# Patient Record
Sex: FEMALE | Race: BLACK OR AFRICAN AMERICAN | NOT HISPANIC OR LATINO | Employment: PART TIME | URBAN - METROPOLITAN AREA
[De-identification: names, ages, dates, MRNs, and addresses within clinical notes are randomized per-mention and may not be internally consistent; named-entity substitution may affect disease eponyms.]

---

## 2017-01-03 ENCOUNTER — ALLIED HEALTH/NURSE VISIT (OUTPATIENT)
Dept: NURSING | Facility: CLINIC | Age: 47
End: 2017-01-03

## 2017-01-03 VITALS — BODY MASS INDEX: 39.18 KG/M2 | WEIGHT: 254.06 LBS

## 2017-01-03 DIAGNOSIS — E66.01 MORBID OBESITY (H): Primary | ICD-10-CM

## 2017-01-03 PROCEDURE — 99207 ZZC HEALTH COACHING, NO CHARGE: CPT

## 2017-01-03 NOTE — MR AVS SNAPSHOT
After Visit Summary   1/3/2017    Keerthi Sloan    MRN: [id number]           Patient Information     Date Of Birth          1970        Visit Information        Provider Department      1/3/2017 10:00 AM HEALTH  - [facility]        Care Instructions      January 3, 2017    [facility]  [address] [address]  [phone]  Health Coaching Progress Note    Patient Name: Keerthi Sloan Date: January 3, 2017    Plan/Goal for the Next 4 Weeks:   GOAL #1: Make it a priority to go shopping more often for fresh food items-shop at least 1 time every 2 weeks  GOAL #1 Progress Toward Goal: 100% Maintenance  GOAL #2: Be more aware of food choices/do not skip meals and snacks throughout the day/use smaller portions-use the new food prep plastic containers  GOAL #2 Progress Toward Goal: 100% Maintenance  GOAL #3: Start routine of going to the [facility] to exercise at least 2 times a week  GOAL #3 Progress Toward Goal: 0% New Goal  GOAL #4: Track daily step count and shoot for at least 5,000 steps per day  GOAL #4 Progress Toward Goal: 0% New Goal  GOAL #5: Start attending physical therapy as directed/schedule a check-in with Janet Endocrinologist  GOAL #5 Progress Toward Goal: 50% Attending PT still need to schedule appt. W/ Janet  GOAL #6: Long term weight loss goal of 165lbs!  GOAL #6 Progress Toward Goal: 0% New Goal      Check out the American Diabetes Website for healthy recipes.  Www.diabetes.org-->go to the recipes tab at the top!       Plan: (Homework, other):  Patient was encouraged to continue to seek condition-related information and education, as well as schedule a follow up appointment with the Health  in 6 weeks. Patient has set self-identified goals and will monitor progress until the next appointment.  Scheduled our next coaching session for Monday February  13th at 2pm.  Jacob Dominguez       Resources:              Follow-ups after your visit        Who to contact     If you have questions or need follow up information about today's clinic visit or your schedule please contact [facility] directly at [phone].  Normal or non-critical lab and imaging results will be communicated to you by MyChart, letter or phone within 4 business days after the clinic has received the results. If you do not hear from us within 7 days, please contact the clinic through [employer] or phone. If you have a critical or abnormal lab result, we will notify you by phone as soon as possible.  Submit refill requests through [employer] or call your pharmacy and they will forward the refill request to us. Please allow 3 business days for your refill to be completed.          Additional Information About Your Visit        MyChart Information     [employer] gives you secure access to your electronic health record. If you see a primary care provider, you can also send messages to your care team and make appointments. If you have questions, please call your primary care clinic.  If you do not have a primary care provider, please call [phone] and they will assist you.        Care EveryWhere ID     This is your Care EveryWhere ID. This could be used by other organizations to access your [facility] medical records  [id number]         Blood Pressure from Last 3 Encounters:   10/03/16 105/74   09/08/16 108/66   09/07/16 134/83    Weight from Last 3 Encounters:   10/03/16 269 lb (122.018 kg)   09/08/16 282 lb (127.914 kg)   09/07/16 283 lb 6.4 oz (128.549 kg)              Today, you had the following     No orders found for display       Primary Care Provider Office Phone # Fax #    [facility] [phone] [address] [facility] [address]        Thank you!     Thank you for choosing [facility]  for your care. Our goal is always to provide  you with excellent care. Hearing back from our patients is one way we can continue to improve our services. Please take a few minutes to complete the written survey that you may receive in the mail after your visit with us. Thank you!             Your Updated Medication List - Protect others around you: Learn how to safely use, store and throw away your medicines at www.disposemymeds.org.          This list is accurate as of: 1/3/17 11:12 AM.  Always use your most recent med list.                   Brand Name Dispense Instructions for use    Ferrous Sulfate 27 MG Tabs      1 tab QD       metFORMIN 500 MG 24 hr tablet    GLUCOPHAGE-XR    60 tablet    Take 1 tablet (500 mg) by mouth 2 times daily (with meals)       naproxen 250 MG tablet    NAPROSYN     1 tab DAILY prn       omeprazole 40 MG capsule    priLOSEC    30 capsule    Take 1 capsule (40 mg) by mouth daily Take 30-60 minutes before a meal.       phentermine 37.5 MG tablet    ADIPEX-P    32 tablet    Take 1 tablet (37.5 mg) by mouth every morning (before breakfast)       topiramate 50 MG tablet    TOPAMAX    180 tablet    Take 1 tablet (50 mg) by mouth 2 times daily       vitamin D 04670 UNIT capsule    ERGOCALCIFEROL    12 capsule    Take 1 capsule (50,000 Units) by mouth every 7 days

## 2017-01-03 NOTE — PATIENT INSTRUCTIONS
January 3, 2017    [facility]  [address] [place] [address]  [phone]  Health Coaching Progress Note    Patient Name: Keerthi Sloan Date: January 3, 2017    Plan/Goal for the Next 4 Weeks:   GOAL #1: Make it a priority to go shopping more often for fresh food items-shop at least 1 time every 2 weeks  GOAL #1 Progress Toward Goal: 100% Maintenance  GOAL #2: Be more aware of food choices/do not skip meals and snacks throughout the day/use smaller portions-use the new food prep plastic containers  GOAL #2 Progress Toward Goal: 100% Maintenance  GOAL #3: Start routine of going to the [facility] to exercise at least 2 times a week  GOAL #3 Progress Toward Goal: 0% New Goal  GOAL #4: Track daily step count and shoot for at least 5,000 steps per day  GOAL #4 Progress Toward Goal: 0% New Goal  GOAL #5: Start attending physical therapy as directed/schedule a check-in with Janet Endocrinologist  GOAL #5 Progress Toward Goal: 50% Attending PT still need to schedule appt. W/ Janet  GOAL #6: Long term weight loss goal of 165lbs!  GOAL #6 Progress Toward Goal: 0% New Goal      Check out the American Diabetes Website for healthy recipes.  Www.diabetes.org-->go to the recipes tab at the top!       Plan: (Homework, other):  Patient was encouraged to continue to seek condition-related information and education, as well as schedule a follow up appointment with the Health  in 6 weeks. Patient has set self-identified goals and will monitor progress until the next appointment.  Scheduled our next coaching session for Monday February 13th at 2pm.  Jacob Dominguez       Resources:

## 2017-01-03 NOTE — PROGRESS NOTES
January 3, 2017    [facility]  [address] [place] [id number] [phone]  Health Coaching Progress Note    Patient Name: Keerthi Sloan Date: January 3, 2017      Session Length: 40      DATA    PRM Master Survey Scores Reviewed: Yes    Core Healthy Days Survey:         YAQUELIN Score (Last Two) 6/27/2016   YAQUELIN Raw Score 39   Activation Score 56.4   YAQUELIN Level 3       PHQ-2 Score 10/3/2016 10/2/2016   PHQ-2 Total Score Interpretation - Positive if 3 or more points; Administer PHQ-9 if positive 0 -   MyC PHQ-2 Score - 0       No flowsheet data found.    Treatment Objective(s) Addressed in This Session:  Target Behavior(s): diet/weight loss and disease management/lifestyle changes of making sure to have healthy foods on hand, making healthier dietary choices/not skipping meals/portion size-using pre-portioned containers for work, getting into an exercise routine-going to [facility] with granddaughter, making time for self, tracking steps with new fit-bit, continuing to get used to new work schedule/finding time to be active, weight loss and maintenance, staying on track, and increasing her water intake!    Current Stressors / Issues:  Recently repaired torn meniscus-reason for her pain past 3 years, recovering from knee surgery/starting PT exercises, new job-wears her out/works nights now, getting used to new routine, staying on track with her diet, prepping for cooking/using a plastic pre-portion meal planning container, being more active as able, staying motivated, continued weight loss!  What Patient Does Well:   1) Patient is doing a great job building a new healthy routine-eating healthier/plans to start exercising more now that meniscus surgery is done!  Previous Successes:   1) Patient was down 25lbs at last check up and weighed in at 254.1 today so she is down a total of 36lbs!  Areas in Need of Improvement:   1) Activity  level/exercise and consistency of changes  2) Diet modification-eating less/healthier foods  3) Building a healthy routine and maintaining it  Barriers to Change:   1) New work schedule/still getting used to new routine-working nights  2) Time-making time for self  3) Meniscus tear-surgery was a success-Currently in PT 2x/week  Reasons for Change:   1) Be healthier/move better-reduce strain on knees  2) Lose weight/avoid other health complications  3) Be around for granddaughter  Plan/Goal for the Next 4 Weeks:   GOAL #1: Make it a priority to go shopping more often for fresh food items-shop at least 1 time every 2 weeks  GOAL #1 Progress Toward Goal: 100% Maintenance  GOAL #2: Be more aware of food choices/do not skip meals and snacks throughout the day/use smaller portions-use new plastic pre-portioned cases for lunches  GOAL #2 Progress Toward Goal: 100% Maintenance  GOAL #3: Start routine of going to the [facility] to exercise at least 2 times a week  GOAL #3 Progress Toward Goal: 25% (ready to start this after knee surgery!)  GOAL #4: Track daily step count and shoot for at least 2,500 steps per day  GOAL #4 Progress Toward Goal: 100% Completed!  (Getting closer to 4-5000 daily!)  GOAL #5: Look into purchasing a fit-bit bracelet tracker-won't miss steps that way!  GOAL #5 Progress Toward Goal: 100% Completed (Got a fit-bit through work!)  GOAL #6: Maintain current amount of weight lost through period of inactivity (meniscus tear surgery) and follow recommendations for recovery/physical therapy  GOAL #6 Progress Toward Goal: 100% Completed! Actually Lost another 14lbs!  GOAL #7: Start attending physical therapy as directed/schedule a check-in with Janet Endocrinologist  GOAL #7 Progress Toward Goal: 50% (Doing physical therapy/still needs to set-up appt with Jaent)  GOAL #8: Track daily step count on fit-bit and shoot for 5,000 steps or more each day   GOAL #8 Progress Toward Goal: 0% New Goal    Long Term Weight  Loss Goal: Weigh in at 165lbs!    Intervention:  Motivational Interviewing    MI Intervention: Expressed Empathy/Understanding, Supported Autonomy, Collaboration, Evocation, Permission to raise concern or advise, Open-ended questions, Reflections: simple and complex and Change talk (evoked)     Change Talk Expressed by the Patient: Desire to change Ability to change Reasons to change Need to change Committment to change Activation Taking steps    Provider Response to Change Talk: E - Evoked more info from patient about behavior change, A - Affirmed patient's thoughts, decisions, or attempts at behavior change, R - Reflected patient's change talk and S - Summarized patient's change talk statements    Assessment / Progress on Treatment Objective(s) / Homework:    Achieved / completed to satisfaction - MAINTENANCE (Working to maintain change, with risk of relapse); Intervened by continuing to positively reinforce healthy behavior choice   Minimal progress - PREPARATION (Decided to change - considering how); Intervened by negotiating a change plan and determining options / strategies for behavior change, identifying triggers, exploring social supports, and working towards setting a date to begin behavior change  New Objective established this session - PREPARATION (Decided to change - considering how); Intervened by negotiating a change plan and determining options / strategies for behavior change, identifying triggers, exploring social supports, and working towards setting a date to begin behavior change  Satisfactory progress - ACTION (Actively working towards change); Intervened by reinforcing change plan / affirming steps taken         Plan: (Homework, other):  Patient was encouraged to continue to seek condition-related information and education, as well as schedule a follow up appointment with the Health  in 6 weeks.. Patient has set self-identified goals and will monitor progress until the next appointment.   Scheduled our next coaching visit for Monday February 13th at 2pm.      Jacob Dominguez

## 2017-01-23 ENCOUNTER — OFFICE VISIT (OUTPATIENT)
Dept: ENDOCRINOLOGY | Facility: CLINIC | Age: 47
End: 2017-01-23
Payer: COMMERCIAL

## 2017-01-23 VITALS
TEMPERATURE: 98.3 F | WEIGHT: 259.8 LBS | BODY MASS INDEX: 39.37 KG/M2 | DIASTOLIC BLOOD PRESSURE: 78 MMHG | RESPIRATION RATE: 16 BRPM | SYSTOLIC BLOOD PRESSURE: 126 MMHG | HEIGHT: 68 IN | HEART RATE: 70 BPM

## 2017-01-23 DIAGNOSIS — R73.03 PREDIABETES: Primary | ICD-10-CM

## 2017-01-23 DIAGNOSIS — E55.9 VITAMIN D DEFICIENCY: ICD-10-CM

## 2017-01-23 DIAGNOSIS — E66.01 MORBID OBESITY DUE TO EXCESS CALORIES (H): ICD-10-CM

## 2017-01-23 PROBLEM — D50.9 ANEMIA, IRON DEFICIENCY: Status: ACTIVE | Noted: 2017-01-23

## 2017-01-23 LAB
ERYTHROCYTE [DISTWIDTH] IN BLOOD BY AUTOMATED COUNT: 17.7 % (ref 10–15)
HBA1C MFR BLD: 5.7 % (ref 4.3–6)
HCT VFR BLD AUTO: 39 % (ref 35–47)
HGB BLD-MCNC: 12.2 G/DL (ref 11.7–15.7)
MCH RBC QN AUTO: 23.8 PG (ref 26.5–33)
MCHC RBC AUTO-ENTMCNC: 31.3 G/DL (ref 31.5–36.5)
MCV RBC AUTO: 76 FL (ref 78–100)
PLATELET # BLD AUTO: 234 10E9/L (ref 150–450)
RBC # BLD AUTO: 5.12 10E12/L (ref 3.8–5.2)
WBC # BLD AUTO: 6.7 10E9/L (ref 4–11)

## 2017-01-23 PROCEDURE — 85027 COMPLETE CBC AUTOMATED: CPT | Performed by: CLINICAL NURSE SPECIALIST

## 2017-01-23 PROCEDURE — 80053 COMPREHEN METABOLIC PANEL: CPT | Performed by: CLINICAL NURSE SPECIALIST

## 2017-01-23 PROCEDURE — 36415 COLL VENOUS BLD VENIPUNCTURE: CPT | Performed by: CLINICAL NURSE SPECIALIST

## 2017-01-23 PROCEDURE — 83540 ASSAY OF IRON: CPT | Performed by: CLINICAL NURSE SPECIALIST

## 2017-01-23 PROCEDURE — 82728 ASSAY OF FERRITIN: CPT | Performed by: CLINICAL NURSE SPECIALIST

## 2017-01-23 PROCEDURE — 83036 HEMOGLOBIN GLYCOSYLATED A1C: CPT | Performed by: CLINICAL NURSE SPECIALIST

## 2017-01-23 PROCEDURE — 99213 OFFICE O/P EST LOW 20 MIN: CPT | Performed by: CLINICAL NURSE SPECIALIST

## 2017-01-23 PROCEDURE — 83550 IRON BINDING TEST: CPT | Performed by: CLINICAL NURSE SPECIALIST

## 2017-01-23 PROCEDURE — 82306 VITAMIN D 25 HYDROXY: CPT | Performed by: CLINICAL NURSE SPECIALIST

## 2017-01-23 NOTE — PROGRESS NOTES
"Chief Complaint   Patient presents with     RECHECK     Vit. D, Metformin, and phentermine        Initial /78 mmHg  Pulse 70  Temp(Src) 98.3  F (36.8  C)  Resp 16  Ht 1.715 m (5' 7.5\")  Wt 117.845 kg (259 lb 12.8 oz)  BMI 40.07 kg/m2  LMP 01/01/2017 (Exact Date)  Breastfeeding? No Estimated body mass index is 40.07 kg/(m^2) as calculated from the following:    Height as of this encounter: 1.715 m (5' 7.5\").    Weight as of this encounter: 117.845 kg (259 lb 12.8 oz).  BP completed using cuff size: large    Waist measurement: 39.75in    Pat Dominguez CMA    1/23/2017  2:54 PM    "

## 2017-01-23 NOTE — PROGRESS NOTES
Name: Keerthi Sloan  F/u for obesity (Last seen 7/13/2016).  HPI:  Keerthi Sloan is a 46 year old female who presents for the f/u of prediabetes, vitamin D deficiency, sleep apnea on CPAP, and obesity.  She started gaining weight in her 30's.  Weight stabilized in the low 300's the past 5 years and she was unable to lose any weight with diet efforts.  She considered gastric bypass and was being seen at [facility] in anticipation of doing the procedure but she didn't feel comfortable doing the bypass so quit going to the clinic.      She was briefly treated with Phentermine 5/2016.  She discontinued this on her own after 1-2 months.  She has been able to lose 51 lbs with diet efforts, 305-->288-->254 lbs, since 12/2015, todays weight increased slightly 259 lbs. Has also been working with Jacob, the health .    + prediabetes - treated with Metformin 500 mg bid.  A1c improved 6.1--->5.5%. Off Phentermine since knee surgery 10/2016.  Would like to resume.  +iron deficiency - thought related to heavy menses, started oral iron 6/2016.  +vitamin D deficiency - D level 15, started vitamin D 50,000 IU weekly.  D improved to 22 (9/2016).  Has continued D 50K/week.  Sleep Apnea/Snores:Yes: diagnosed with sleep apnea, uses CPAP  Hyperlipidemia:Yes: elevated triglycerides  Hirsutism:Yes: increased facial hair growth (chin)  Fmily history of Obesity:Yes: Mother had gastric bypass  Diet: Eating whole, unprocessed foods, increased veggies, portion control.  Trying to eat more regularly throughout the day - usual eating pattern is to skip breakfast and lunch then overeat at dinner because she is so hungry by that point).  Has been trying to pack food for breakfast and lunch while at work.   Exercise:Yes: recently joined the [facility]   Health :  First session 6/27/2016, most recent session 1/3/2017:  Health coaching GOALS: Patient will work on the following goals until the next health  meeting:  Plan/Goal for the  Next 4 Weeks:   GOAL #1: Make it a priority to go shopping more often for fresh food items-shop at least 1 time every 2 weeks  GOAL #1 Progress Toward Goal: 100% Maintenance  GOAL #2: Be more aware of food choices/do not skip meals and snacks throughout the day/use smaller portions-use new plastic pre-portioned cases for lunches  GOAL #2 Progress Toward Goal: 100% Maintenance  GOAL #3: Start routine of going to the [facility] to exercise at least 2 times a week  GOAL #3 Progress Toward Goal: 25% (ready to start this after knee surgery!)  GOAL #4: Track daily step count and shoot for at least 2,500 steps per day  GOAL #4 Progress Toward Goal: 100% Completed!  (Getting closer to 4-5000 daily!)  GOAL #5: Look into purchasing a fit-bit bracelet tracker-won't miss steps that way!  GOAL #5 Progress Toward Goal: 100% Completed (Got a fit-bit through work!)  GOAL #6: Maintain current amount of weight lost through period of inactivity (meniscus tear surgery) and follow recommendations for recovery/physical therapy  GOAL #6 Progress Toward Goal: 100% Completed! Actually Lost another 14lbs!  GOAL #7: Start attending physical therapy as directed/schedule a check-in with Janet Endocrinologist  GOAL #7 Progress Toward Goal: 50% (Doing physical therapy/still needs to set-up appt with Janet)  GOAL #8: Track daily step count on fit-bit and shoot for 5,000 steps or more each day    GOAL #8 Progress Toward Goal: 0% New Goal    Long Term Weight Loss Goal: Weigh in at 165lbs!2.      3.  Other: elevated 11pm salivary cortisol.  1 mg Dexamethasone suppression test was normal.      PMH/PSH:  No past medical history on file.  Past Surgical History   Procedure Laterality Date     Hernia repair       Gyn surgery       Tubal ligation  1997     LBTL     Family Hx:  Family History   Problem Relation Age of Onset     DIABETES Father      Hypertension Father      KIDNEY DISEASE Father      DIABETES Brother      Hypertension Brother      Thyroid  "disease: No         DM2: Yes: father         Autoimmune: DM1, SLE, RA, Vitiligo No    Social Hx:  Social History     Social History     Marital Status:      Spouse Name: N/A     Number of Children: 3     Years of Education: N/A     Occupational History     [employer]     Social History Main Topics     Smoking status: Never Smoker      Smokeless tobacco: Never Used     Alcohol Use: No     Drug Use: No     Sexual Activity: Not Currently     Other Topics Concern     Not on file     Social History Narrative          MEDICATIONS:  has a current medication list which includes the following prescription(s): vitamin d, ferrous sulfate, naproxen, topiramate, phentermine, and metformin.    ROS     GENERAL: + weight loss with effort, history of continued weight gain, no fevers, + fatigue, + night sweats and hot flashes.   HEENT: no dysphagia, odynophagia, diplopia, neck pain or tenderness, dry/scratch eyes, URI, cough, sinus drainage, tinnitus, sinus pressure  CV: no chest pain, pressure, palpitations, skipped beats, LOC  LUNGS: no SOB, STEELE, cough, sputum production, wheezing   ABDOMEN: no diarrhea, constipation, abdominal pain  EXTREMITIES: no rashes, ulcers, edema  NEUROLOGY: no changes in vision, tingling or numbness in hands or feet.   MSK: + muscle aches, + joint pain, no weakness  SKIN: no rashes or lesions  ENDOCRINE: no heat or cold intolerance    Physical Exam   VS: /78 mmHg  Pulse 70  Temp(Src) 98.3  F (36.8  C)  Resp 16  Ht 1.715 m (5' 7.5\")  Wt 117.845 kg (259 lb 12.8 oz)  BMI 40.07 kg/m2  LMP 01/01/2017 (Exact Date)  Breastfeeding? No  GENERAL: NAD, well dressed, answering questions appropriately, appears stated age.  HEENT: OP clear, no exopthalmous, no proptosis, EOMI, no lig lag, no retraction, no scleral icterus  RESPIRATORY: Clear bilaterally. Normal respiratory effort.  CARDIOVASCULAR: RRR.  No peripheral edema.  EXTREMITIES: no edema, +pulses, no rashes, no " lesions  NEUROLOGY: CN grossly intact, no tremors  MSK: grossly intact, No digital cyanosis. Normal gait and station.  SKIN: no rashes, no lesions, no ulcers  PSYCH: Intact judgment and insight. A&OX3 with a cordial affect.    LABS:  !COMPREHENSIVE Latest Ref Rng 12/3/2015 5/26/2016   SODIUM 133 - 144 mmol/L 136 138   POTASSIUM 3.4 - 5.3 mmol/L 4.3 3.6   CHLORIDE 94 - 109 mmol/L 104 105   BUN 7 - 30 mg/dL 12 13   Creatinine 0.52 - 1.04 mg/dL 0.61 0.62   Glucose 70 - 99 mg/dL 84 91   ANION GAP 3 - 14 mmol/L 8 5   CALCIUM 8.5 - 10.1 mg/dL 8.5 8.7     Component    Latest Ref Rn 5/26/2016 9/8/2016   Hemoglobin A1C    4.3 - 6.0 % 6.1 (H) 5.5     Component    Latest Ref [facility] 5/26/2016   C-Peptide    0.9 - 6.9 ng/mL 1.9   Insulin     9     Component    Latest Ref Rn 5/26/2016   WBC    4.0 - 11.0 10e9/L 6.0   RBC Count    3.8 - 5.2 10e12/L 5.08   Hemoglobin    11.7 - 15.7 g/dL 10.9 (L)   Hematocrit    35.0 - 47.0 % 35.7   MCV    78 - 100 fl 70 (L)   MCH    26.5 - 33.0 pg 21.5 (L)   MCHC    31.5 - 36.5 g/dL 30.5 (L)   RDW    10.0 - 15.0 % 17.7 (H)   Platelet Count    150 - 450 10e9/L 225     Component    Latest Ref Rn 5/26/2016   Iron    35 - 180 ug/dL 27 (L)   Iron Binding Cap    240 - 430 ug/dL 378   Iron Saturation Index    15 - 46 % 7 (L)   Ferritin    12 - 150 ng/mL 3 (L)     Component    Latest Ref Rn 5/26/2016   Saliva Collection Time     2300   Cortisol Saliva     0.129     1 mg Dexamethasone suppression test:  Component    Latest Ref Rn 6/17/2016   Cortisol Serum    4 - 22 ug/dL 0.8 (L)     Component    Latest Ref Rn 5/26/2016   Testosterone Total    8 - 60 ng/dL 4 (L)   Sex Hormone Binding Globulin    30 - 135 nmol/L 34   Free Testosterone Calculated    0.11 - 0.58 ng/dL 0.07 (L)   Estradiol     26   FSH     5.8   Lutropin     2.7     Component      Latest Ref Rng 5/26/2016 9/8/2016   Vitamin D Deficiency screening      20 - 75 ug/L 15 (L) 22     Vital Signs 7/13/2016 8/5/2016 9/7/2016 9/8/2016 10/3/2016  "  Weight (LB) 288 lb 277 lb 6 oz 283 lb 6.4 oz 282 lb 269 lb   Height   5' 7\"  5' 7.5\"   BMI   44.48  41.6     Vital Signs 1/3/2017 1/23/2017   Weight (LB) 254 lb 1 oz 259 lb 12.8 oz   Height  5' 7.5\"   BMI  40.17       All pertinent notes, labs, and images personally reviewed by me.     A/P  Ms.Kimberly CAROLYN Sloan is a 46 year old here for the evaluation of obesity:    1. IFG/prediabetes.  Fasting glucose elevated x 2, most recently in 2014.  + FH of type 2 diabetes.  No personal history of GD (3 pregnancies).  A1c elevated at 6.1%, improved to 5.5%.  Started Metformin  mg bid 6/2016, off since 10/2016.  Restart Metformin  mg bid. Was tolerating Metformin well.  Continue current dose.  Plan to recheck A1c today and if increased > 5.7% will consider increasing Metformin to 2000 mg/day.    2. Obesity-  Current BMI 44.  Her comorbidities include IFG/prediabetes and MARÍA.  Obesity is associated with a significant increase in mortality and risk of many disorders, including diabetes mellitus, hypertension, dyslipidemia, heart disease, stroke, sleep apnea, cancer, and many others. Conversely, weight loss is associated with a reduction in obesity-associated morbidity.    Endocrine evaluation of obesity includes Diabetes/prediabetes, Cushing's and thyroid dysfunction.  The relevant work up for the diagnosis and management of obesity and various treatment options were discussed. Body Mass Index (BMI) has been a standard means for calculating risk for overweight and obesity. The new American Association of Clinical Endocrinology (AACE) algorithm recommends lifestyle modifications as the initial phase of treatment for all patients with the BMI equal or greater than 25 kg/m2. Lifestyle modifications includes use of medical nutrition therapy, exercise, tobacco cessation, adequate quality and quantity of sleep, limited consumption of alcohol and reduced stress through implementation of a structured, multidisciplinary " program.    In patients with complications associated with obesity, graded interventions are recommended including pharmacological therapy such as phentermine, orlistat, lorcaserin and phentermine/topiramate ER, contrave ( buproprion/naltreone) and the use of very low calorie meal replacement programs.    If medical intervention is insufficient, surgical therapy may be considered, especially in patients with BMI greater than or equal to 35 kg/m2 with multiple complications. Surgical treatments include lap-band, gastric sleeve or gastric bypass surgery.    She is considering surgical weight loss options.  Interested in transoral gastroplasty - procedure is done at [place].  Counseling exercise ( V65.41)  Dietary counseling( V65.3)  Calculated BMI above the upper parameter and f/u plan was documented in the medical record.  Discussed indications, risks and benefits of all medications prescribed, and answered questions to patient's satisfaction.  Continue working with Health .    3. Vitamin D deficiency.  Vitamin D level low at 15, improved to 22.  Started Vitamin D 50,000 IU weekly 6/2016.  Plan to recheck D level today.    Labs ordered today: No orders of the defined types were placed in this encounter.     Radiology/Consults ordered today: None    More than 50% of the time spent with Ms. Sloan on counseling / coordinating her care.  Total face to face time was greater than or equal to 15 minutes.      Follow-up:  6 months    Janet Castaneda NP  Endocrinology  [facility]  CC:   ____________________________________________________________

## 2017-01-24 LAB
ALBUMIN SERPL-MCNC: 3.8 G/DL (ref 3.4–5)
ALP SERPL-CCNC: 52 U/L (ref 40–150)
ALT SERPL W P-5'-P-CCNC: 14 U/L (ref 0–50)
ANION GAP SERPL CALCULATED.3IONS-SCNC: 9 MMOL/L (ref 3–14)
AST SERPL W P-5'-P-CCNC: 9 U/L (ref 0–45)
BILIRUB SERPL-MCNC: 0.4 MG/DL (ref 0.2–1.3)
BUN SERPL-MCNC: 12 MG/DL (ref 7–30)
CALCIUM SERPL-MCNC: 9.1 MG/DL (ref 8.5–10.1)
CHLORIDE SERPL-SCNC: 106 MMOL/L (ref 94–109)
CO2 SERPL-SCNC: 26 MMOL/L (ref 20–32)
CREAT SERPL-MCNC: 0.66 MG/DL (ref 0.52–1.04)
DEPRECATED CALCIDIOL+CALCIFEROL SERPL-MC: 21 UG/L (ref 20–75)
FERRITIN SERPL-MCNC: 4 NG/ML (ref 12–150)
GFR SERPL CREATININE-BSD FRML MDRD: NORMAL ML/MIN/1.7M2
GLUCOSE SERPL-MCNC: 89 MG/DL (ref 70–99)
IRON SATN MFR SERPL: 10 % (ref 15–46)
IRON SERPL-MCNC: 37 UG/DL (ref 35–180)
POTASSIUM SERPL-SCNC: 4.2 MMOL/L (ref 3.4–5.3)
PROT SERPL-MCNC: 7.7 G/DL (ref 6.8–8.8)
SODIUM SERPL-SCNC: 141 MMOL/L (ref 133–144)
TIBC SERPL-MCNC: 360 UG/DL (ref 240–430)

## 2017-01-24 RX ORDER — METFORMIN HCL 500 MG
500 TABLET, EXTENDED RELEASE 24 HR ORAL 2 TIMES DAILY WITH MEALS
Qty: 180 TABLET | Refills: 3 | Status: SHIPPED | OUTPATIENT
Start: 2017-01-24 | End: 2017-12-06

## 2017-01-26 NOTE — PROGRESS NOTES
Quick Note:    Keerthi,  Your iron levels and vitamin D are about the same.  I would continue the vitamin D 50,000 IU once weekly.  Maybe you need a higher iron dose? Check with your primary care provider.  Your A1c (3 month blood sugar average) has increased again since being off the Metformin, 5.5% to 5.7%.  I think it is good to restart the Metformin.  Let me know if you have any questions.  Take care,  Janet Castaneda NP  Endocrinology    ______

## 2017-02-07 ENCOUNTER — RADIANT APPOINTMENT (OUTPATIENT)
Dept: MAMMOGRAPHY | Facility: CLINIC | Age: 47
End: 2017-02-07
Attending: FAMILY MEDICINE
Payer: COMMERCIAL

## 2017-02-07 DIAGNOSIS — Z12.31 VISIT FOR SCREENING MAMMOGRAM: ICD-10-CM

## 2017-02-07 PROCEDURE — G0202 SCR MAMMO BI INCL CAD: HCPCS | Mod: TC

## 2017-02-15 ENCOUNTER — OFFICE VISIT (OUTPATIENT)
Dept: FAMILY MEDICINE | Facility: CLINIC | Age: 47
End: 2017-02-15
Payer: COMMERCIAL

## 2017-02-15 VITALS
TEMPERATURE: 97.6 F | BODY MASS INDEX: 38.45 KG/M2 | SYSTOLIC BLOOD PRESSURE: 102 MMHG | OXYGEN SATURATION: 98 % | DIASTOLIC BLOOD PRESSURE: 66 MMHG | HEART RATE: 78 BPM | WEIGHT: 245 LBS | RESPIRATION RATE: 12 BRPM | HEIGHT: 67 IN

## 2017-02-15 DIAGNOSIS — N94.6 DYSMENORRHEA: ICD-10-CM

## 2017-02-15 DIAGNOSIS — N39.46 MIXED INCONTINENCE: ICD-10-CM

## 2017-02-15 DIAGNOSIS — K21.9 GASTROESOPHAGEAL REFLUX DISEASE, ESOPHAGITIS PRESENCE NOT SPECIFIED: Primary | ICD-10-CM

## 2017-02-15 PROCEDURE — 99214 OFFICE O/P EST MOD 30 MIN: CPT | Performed by: FAMILY MEDICINE

## 2017-02-15 RX ORDER — OXYCODONE HYDROCHLORIDE 5 MG/1
TABLET ORAL
Refills: 0 | COMMUNITY
Start: 2016-10-20 | End: 2018-01-12

## 2017-02-15 RX ORDER — PHENTERMINE HYDROCHLORIDE 37.5 MG/1
TABLET ORAL
COMMUNITY
Start: 2017-02-07 | End: 2017-03-27

## 2017-02-15 RX ORDER — OMEPRAZOLE 40 MG/1
40 CAPSULE, DELAYED RELEASE ORAL DAILY PRN
COMMUNITY
Start: 2016-09-07

## 2017-02-15 RX ORDER — TOPIRAMATE 50 MG/1
TABLET, FILM COATED ORAL
COMMUNITY
Start: 2016-09-08 | End: 2017-12-06

## 2017-02-15 NOTE — PROGRESS NOTES
"  SUBJECTIVE:                                                    Keerthi Sloan is a 46 year old female who presents to clinic today for the following health issues:    Patient originally scheduled a physical today, but upon chart review I noted that she had a physical in September 2016.  Instead of a physical, she would like to discuss the following issues today:    1) Patient had an endometrial ablation in 2014 for heavy and painful menses.  Flow much better for the next year, but now having more heavy periods (has to change pad or tampon q2hrs and passing clots).  Cramping is not bad now.  She is having regular cycles.  She recently had her iron levels checked and they were low.  She is currently taking OTC iron daily.      2) Patient is having issues with urinary incontinence.  When she wakes up in the morning she has to run to the bathroom and often doesn't make it in time.  She often doesn't feel the sensation that she has to go.  This is happening throughout the day.  She has had three vaginal deliveries.      3) She has been feeling \"pressure\" and feeling like things are getting caught in her esophagus.  Trialed omeprazole last fall and it helped, but she has run out of medication.  Diagnosed with GERD back in the 90s with an EGD.  Tried Zantac for awhile but then seemed to get better after a divorce.  She has been trying to stay away from acidic foods.  Patient has been taking ibuprofen 600mg every other day for her left knee pain (had surgery in October).        Problem list and histories reviewed & adjusted, as indicated.  Additional history: as documented    Problem list, Medication list, Allergies, and Medical/Social/Surgical histories reviewed in [facility] and updated as appropriate.    ROS:  Constitutional, HEENT, cardiovascular, pulmonary, gi and gu systems are negative, except as otherwise noted.    OBJECTIVE:                                                    /66 (BP Location: Right arm, " "Patient Position: Chair, Cuff Size: Adult Regular)  Pulse 78  Temp 97.6  F (36.4  C) (Oral)  Resp 12  Ht 5' 7\" (1.702 m)  Wt 245 lb (111.1 kg)  LMP 01/01/2017 (Exact Date)  SpO2 98%  BMI 38.37 kg/m2  Body mass index is 38.37 kg/(m^2).  GENERAL: healthy, alert and no distress  ABDOMEN: soft, nontender, no hepatosplenomegaly, no masses and bowel sounds normal   (female): deferred     ASSESSMENT/PLAN:                                                      1. Gastroesophageal reflux disease, esophagitis presence not specified  - Advised completely stopping or cutting down on use of NSAIDs  - Recommended naproxen instead of ibuprofen since is can be gentler on the stomach  - Discussed chronic use of omeprazole no longer recommended  - Advised zantac instead   - ranitidine (ZANTAC) 150 MG tablet; Take 1 tablet (150 mg) by mouth 2 times daily  Dispense: 60 tablet; Refill: 3    2. Dysmenorrhea  - Advised pt to follow up with her OB/Gyn about this issue to discuss further treatment options  - She had an endometrial ablation in 2014    3. Mixed incontinence  - Again, advised discussing with her OB/Gyn  - If she decides to undergo a hysterectomy for her dysmenorrhea, she may choose to get a bladder procedure done at the same time     Follow up if symptoms are worsening or not improving    Elida Cameron, DO  [facility]    "

## 2017-02-15 NOTE — MR AVS SNAPSHOT
After Visit Summary   2/15/2017    Keerthi Sloan    MRN: [id number]           Patient Information     Date Of Birth          1970        Visit Information        Provider Department      2/15/2017 7:00 AM Elida Cameron,  [facility]        Today's Diagnoses     Gastroesophageal reflux disease, esophagitis presence not specified    -  1    Dysmenorrhea        Mixed incontinence          Care Instructions      Preventive Health Recommendations  Female Ages 40 to 49    Yearly exam:     See your health care provider every year in order to  1. Review health changes.   2. Discuss preventive care.    3. Review your medicines if your doctor prescribed any.      Get a Pap test every three years (unless you have an abnormal result and your provider advises testing more often).      If you get Pap tests with HPV test, you only need to test every 5 years, unless you have an abnormal result. You do not need a Pap test if your uterus was removed (hysterectomy) and you have not had cancer.      You should be tested each year for STDs (sexually transmitted diseases), if you're at risk.       Ask your doctor if you should have a mammogram.      Have a colonoscopy (test for colon cancer) if someone in your family has had colon cancer or polyps before age 50.       Have a cholesterol test every 5 years.       Have a diabetes test (fasting glucose) after age 45. If you are at risk for diabetes, you should have this test every 3 years.    Shots: Get a flu shot each year. Get a tetanus shot every 10 years.     Nutrition:     Eat at least 5 servings of fruits and vegetables each day.    Eat whole-grain bread, whole-wheat pasta and brown rice instead of white grains and rice.    Talk to your provider about Calcium and Vitamin D.     Lifestyle    Exercise at least 150 minutes a week (an average of 30 minutes a day, 5 days a week). This will help you control your weight and prevent  "disease.    Limit alcohol to one drink per day.    No smoking.     Wear sunscreen to prevent skin cancer.    See your dentist every six months for an exam and cleaning.        Follow-ups after your visit        Follow-up notes from your care team     Return if symptoms worsen or fail to improve.      Who to contact     If you have questions or need follow up information about today's clinic visit or your schedule please contact [facility] directly at [phone].  Normal or non-critical lab and imaging results will be communicated to you by [employer], letter or phone within 4 business days after the clinic has received the results. If you do not hear from us within 7 days, please contact the clinic through [employer] or phone. If you have a critical or abnormal lab result, we will notify you by phone as soon as possible.  Submit refill requests through [employer] or call your pharmacy and they will forward the refill request to us. Please allow 3 business days for your refill to be completed.          Additional Information About Your Visit        [employer] Information     [employer] gives you secure access to your electronic health record. If you see a primary care provider, you can also send messages to your care team and make appointments. If you have questions, please call your primary care clinic.  If you do not have a primary care provider, please call [phone] and they will assist you.        Care EveryWhere ID     This is your Care EveryWhere ID. This could be used by other organizations to access your [facility] medical records  [id number]        Your Vitals Were     Pulse Temperature Respirations Height Last Period Pulse Oximetry    78 97.6  F (36.4  C) (Oral) 12 5' 7\" (1.702 m) 01/01/2017 (Exact Date) 98%    BMI (Body Mass Index)                   38.37 kg/m2            Blood Pressure from Last 3 Encounters:   02/15/17 102/66   01/23/17 126/78   10/03/16 105/74    Weight from Last 3 Encounters: "   02/15/17 245 lb (111.1 kg)   01/23/17 259 lb 12.8 oz (117.8 kg)   01/03/17 254 lb 1 oz (115.2 kg)              Today, you had the following     No orders found for display         Today's Medication Changes          These changes are accurate as of: 2/15/17  8:35 AM.  If you have any questions, ask your nurse or doctor.               Start taking these medicines.        Dose/Directions    ranitidine 150 MG tablet   Commonly known as:  ZANTAC   Used for:  Gastroesophageal reflux disease, esophagitis presence not specified   Started by:  Elida Cameron DO        Dose:  150 mg   Take 1 tablet (150 mg) by mouth 2 times daily   Quantity:  60 tablet   Refills:  3            Where to get your medicines      These medications were sent to [facility] [address] [address]     Phone:  [phone]     ranitidine 150 MG tablet                Primary Care Provider Office Phone # Fax #    Elida Cameron [phone]       [address] [address]        Thank you!     Thank you for choosing [facility]  for your care. Our goal is always to provide you with excellent care. Hearing back from our patients is one way we can continue to improve our services. Please take a few minutes to complete the written survey that you may receive in the mail after your visit with us. Thank you!             Your Updated Medication List - Protect others around you: Learn how to safely use, store and throw away your medicines at www.disposemymeds.org.          This list is accurate as of: 2/15/17  8:35 AM.  Always use your most recent med list.                   Brand Name Dispense Instructions for use    Ferrous Sulfate 27 MG Tabs      1 tab QD       metFORMIN 500 MG 24 hr tablet    GLUCOPHAGE-XR    180 tablet    Take 1 tablet (500 mg) by mouth 2 times daily (with meals)       naproxen  250 MG tablet    NAPROSYN     1 tab DAILY prn       omeprazole 40 MG capsule    priLOSEC         oxyCODONE 5 MG IR tablet    ROXICODONE     TK 1 TO 2 TS PO Q 3 H       phentermine 37.5 MG tablet    ADIPEX-P         ranitidine 150 MG tablet    ZANTAC    60 tablet    Take 1 tablet (150 mg) by mouth 2 times daily       topiramate 50 MG tablet    TOPAMAX         vitamin D 04866 UNIT capsule    ERGOCALCIFEROL    12 capsule    Take 1 capsule (50,000 Units) by mouth every 7 days

## 2017-02-15 NOTE — NURSING NOTE
"Chief Complaint   Patient presents with     Physical     Initial /66 (BP Location: Right arm, Patient Position: Chair, Cuff Size: Adult Regular)  Pulse 78  Temp 97.6  F (36.4  C) (Oral)  Resp 12  Ht 5' 7\" (1.702 m)  Wt 245 lb (111.1 kg)  LMP 01/01/2017 (Exact Date)  SpO2 98%  BMI 38.37 kg/j5BRYVGA@      BP completed using cuff size regular long rt Arm    Health Maintenance Updated with Patient:Yes  Tobacco Verified:  Yes  Payor/Verify RX Benefits/Reconcile Disp Completed if allowed:  Yes  Family History Updated:  Yes  Immunizations Up to Date: yes  Mychart Offered:  Yes    Dorene Hook MA    "

## 2017-02-23 ENCOUNTER — TELEPHONE (OUTPATIENT)
Dept: NURSING | Facility: CLINIC | Age: 47
End: 2017-02-23

## 2017-02-28 ENCOUNTER — ALLIED HEALTH/NURSE VISIT (OUTPATIENT)
Dept: NURSING | Facility: CLINIC | Age: 47
End: 2017-02-28

## 2017-02-28 DIAGNOSIS — E66.01 MORBID OBESITY (H): Primary | ICD-10-CM

## 2017-02-28 PROCEDURE — 99207 ZZC HEALTH COACHING, NO CHARGE: CPT

## 2017-02-28 NOTE — PATIENT INSTRUCTIONS
February 28, 2017    [address] [address]  [phone]  Health Coaching Progress Note    Patient Name: Keerthi Sloan Date: February 28, 2017      GOALS: Patient will work on the following goals until our next meeting:  GOAL #1: Make it a priority to go shopping more often for fresh food items-shop at least 1 time every 2 weeks  GOAL #1 Progress Toward Goal: 100% Maintenance  GOAL #2: Be more aware of food choices/do not skip meals and snacks throughout the day/use smaller portions-use new plastic pre-portioned cases for lunches  GOAL #2 Progress Toward Goal: 100% Maintenance  GOAL #3: Start routine of going to the [facility] to exercise at least 2 times a week  GOAL #3 Progress Toward Goal: 75% (Make 2 times consistent)  GOAL #4: Start attending physical therapy as directed/schedule a check-in with Janet Endocrinologist  GOAL #4 Progress Toward Goal: 100% Completed (Still planning to do some more Physical Therapy)  GOAL #5: Track daily step count on fit-bit and shoot for 6,000 steps or more each day   GOAL #5 Progress Toward Goal: 0% New Goal  GOAL #6: Work on logging foods, exercise and weight each day in journal/sheets provided today.  GOAL #6 Progress Toward Goal: 0% New Goal  GOAL #7: Continue to work on food-prepping/pre-planning ahead for meals  GOAL #7 Progress Toward Goal: 0% New Goal      Long Term Weight Loss Goal: Weigh in at 165lbs!      Plan: (Homework, other):  Patient was encouraged to continue to seek condition-related information and education, as well as schedule a follow up appointment with the Health  in 5 weeks. Patient has set self-identified goals and will monitor progress until the next appointment.  Scheduled our next meeting for April 4th at 9am at the [facility].  Jacob Dominguez       Resources:

## 2017-02-28 NOTE — MR AVS SNAPSHOT
After Visit Summary   2/28/2017    Keerthi Sloan    MRN: [id number]           Patient Information     Date Of Birth          1970        Visit Information        Provider Department      2/28/2017 9:00 AM HEALTH  - REGION 5 [facility]        Care Instructions      February 28, 2017    [facility]  [address] [facility] [address]  [phone]  Health Coaching Progress Note    Patient Name: Keerthi Sloan Date: February 28, 2017      GOALS: Patient will work on the following goals until our next meeting:  GOAL #1: Make it a priority to go shopping more often for fresh food items-shop at least 1 time every 2 weeks  GOAL #1 Progress Toward Goal: 100% Maintenance  GOAL #2: Be more aware of food choices/do not skip meals and snacks throughout the day/use smaller portions-use new plastic pre-portioned cases for lunches  GOAL #2 Progress Toward Goal: 100% Maintenance  GOAL #3: Start routine of going to the [facility] to exercise at least 2 times a week  GOAL #3 Progress Toward Goal: 75% (Make 2 times consistent)  GOAL #4: Start attending physical therapy as directed/schedule a check-in with Janet Endocrinologist  GOAL #4 Progress Toward Goal: 100% Completed (Still planning to do some more Physical Therapy)  GOAL #5: Track daily step count on fit-bit and shoot for 6,000 steps or more each day   GOAL #5 Progress Toward Goal: 0% New Goal  GOAL #6: Work on logging foods, exercise and weight each day in journal/sheets provided today.  GOAL #6 Progress Toward Goal: 0% New Goal  GOAL #7: Continue to work on food-prepping/pre-planning ahead for meals  GOAL #7 Progress Toward Goal: 0% New Goal      Long Term Weight Loss Goal: Weigh in at 165lbs!      Plan: (Homework, other):  Patient was encouraged to continue to seek condition-related information and education, as well as schedule a follow up  appointment with the Health  in 5 weeks. Patient has set self-identified goals and will monitor progress until the next appointment.  Scheduled our next meeting for April 4th at 9am at the [facility].  Jacob Dominguez       Resources:              Follow-ups after your visit        Who to contact     If you have questions or need follow up information about today's clinic visit or your schedule please contact [facility] directly at [phone].  Normal or non-critical lab and imaging results will be communicated to you by [employer]ah[employer], letter or phone within 4 business days after the clinic has received the results. If you do not hear from us within 7 days, please contact the clinic through [employer] or phone. If you have a critical or abnormal lab result, we will notify you by phone as soon as possible.  Submit refill requests through [employer] or call your pharmacy and they will forward the refill request to us. Please allow 3 business days for your refill to be completed.          Additional Information About Your Visit        [employer] Information     [employer] gives you secure access to your electronic health record. If you see a primary care provider, you can also send messages to your care team and make appointments. If you have questions, please call your primary care clinic.  If you do not have a primary care provider, please call [phone] and they will assist you.        Care EveryWhere ID     This is your Care EveryWhere ID. This could be used by other organizations to access your [place] medical records  [id number]         Blood Pressure from Last 3 Encounters:   02/15/17 102/66   01/23/17 126/78   10/03/16 105/74    Weight from Last 3 Encounters:   02/15/17 245 lb (111.1 kg)   01/23/17 259 lb 12.8 oz (117.8 kg)   01/03/17 254 lb 1 oz (115.2 kg)              Today, you had the following     No orders found for display       Primary Care Provider Office Phone # Fax #    Elida  Key Cameron [phone]       [facility] [address]  [facility] [address]        Thank you!     Thank you for choosing [facility]  for your care. Our goal is always to provide you with excellent care. Hearing back from our patients is one way we can continue to improve our services. Please take a few minutes to complete the written survey that you may receive in the mail after your visit with us. Thank you!             Your Updated Medication List - Protect others around you: Learn how to safely use, store and throw away your medicines at www.disposemymeds.org.          This list is accurate as of: 2/28/17 10:14 AM.  Always use your most recent med list.                   Brand Name Dispense Instructions for use    Ferrous Sulfate 27 MG Tabs      1 tab QD       metFORMIN 500 MG 24 hr tablet    GLUCOPHAGE-XR    180 tablet    Take 1 tablet (500 mg) by mouth 2 times daily (with meals)       naproxen 250 MG tablet    NAPROSYN     1 tab DAILY prn       omeprazole 40 MG capsule    priLOSEC         oxyCODONE 5 MG IR tablet    ROXICODONE     TK 1 TO 2 TS PO Q 3 H       phentermine 37.5 MG tablet    ADIPEX-P         ranitidine 150 MG tablet    ZANTAC    60 tablet    Take 1 tablet (150 mg) by mouth 2 times daily       topiramate 50 MG tablet    TOPAMAX         vitamin D 71869 UNIT capsule    ERGOCALCIFEROL    12 capsule    Take 1 capsule (50,000 Units) by mouth every 7 days

## 2017-02-28 NOTE — PROGRESS NOTES
February 28, 2017    [address] [id number] [phone]  Health Coaching Progress Note    Patient Name: Keerthi Sloan Date: February 28, 2017      Session Length: 30      DATA    PRM Master Survey Scores Reviewed: Yes    Core Healthy Days Survey:         YAQUELIN Score (Last Two) 6/27/2016   YAQUELIN Raw Score 39   Activation Score 56.4   YAQUELIN Level 3       PHQ-2 Score 10/3/2016 10/2/2016   PHQ-2 Total Score Interpretation - Positive if 3 or more points; Administer PHQ-9 if positive 0 -   MyC PHQ-2 Score - 0       No flowsheet data found.    Treatment Objective(s) Addressed in This Session:  Target Behavior(s): diet/weight loss and disease management/lifestyle changes of making sure to have healthy foods on hand, making healthier dietary choices/not skipping meals/portion size-using pre-portioned containers for work, plans to start journaling food intake, exercise and weight, getting into an exercise routine-going to [facility] with granddaughter, making time for self, tracking steps with new fit-bit-->shooting for 6,000 daily now, continuing to get used to new work schedule/finding time to be active, weight loss and maintenance, staying on track, and increasing her water intake!     Current Stressors / Issues:  Still recovering from knee surgery/still limping/working with PT, job has changing hours each month/works nights, getting used to new routine, staying on track with her diet, prepping for cooking/using a plastic pre-portion meal planning container, being more active as able, upcoming trip to [place]-staying on track, staying motivated, continued weight loss!  What Patient Does Well:   1) Patient is doing a great job building a new healthy routine-eating healthier/working to pre-plan ahead and pre-prep foods and be more active/exercise!  Previous Successes:   1) Patient is down an additional 5lbs since last checkup  with Janet and down 19lbs since our last meeting!  Patient has now lost 50lbs in our time working together!  Areas in Need of Improvement:   1) Activity level/exercise and consistency of changes  2) Diet modification-eating less/healthier foods  3) Building a healthy routine and maintaining it  Barriers to Change:   1) Works nights-schedule changes monthly  2) Time-making time for self  3) Knee is still recovering-still has a limp/continuing to work with PT  Reasons for Change:   1) Be healthier/move better-reduce strain on knees  2) Lose weight/avoid other health complications  3) Be around for granddaughter  Plan/Goal for the Next 4 Weeks:   GOAL #1: Make it a priority to go shopping more often for fresh food items-shop at least 1 time every 2 weeks  GOAL #1 Progress Toward Goal: 100% maintenance  GOAL #2: Be more aware of food choices/do not skip meals and snacks throughout the day/use smaller portions-use new plastic pre-portioned cases for lunches  GOAL #2 Progress Toward Goal: 100% maintenance  GOAL #3: Start routine of going to the [facility] to exercise at least 2 times a week  GOAL #3 Progress Toward Goal: 75% (Doing better here/needs to be more consistent with routine)  GOAL #4: Start attending physical therapy as directed/schedule a check-in with Janet, Endocrinologist  GOAL #4 Progress Toward Goal: 100% (Completed-checkup with Janet/still needs more follow-up with PT)  GOAL #5: Track daily step count on fit-bit and shoot for 5,000 steps or more each day   GOAL #5 Progress Toward Goal: 100% Completed  GOAL #6: Track daily step count on fit-bit and shoot for 6,000 steps or more each day   GOAL #6 Progress Toward Goal: 0% New Goal  GOAL #7: Work on logging foods, exercise and weight each day in journal/sheets provided today  GOAL #7 Progress Toward Goal: 0% New Goal  GOAL #8: Continue to work on food-prepping/pre-planning ahead for meals  GOAL #8 Progress Toward Goal: 0% New Goal    Intervention:  Motivational  Interviewing    MI Intervention: Expressed Empathy/Understanding, Supported Autonomy, Collaboration, Evocation, Permission to raise concern or advise, Open-ended questions, Reflections: simple and complex and Change talk (evoked)     Change Talk Expressed by the Patient: Desire to change Ability to change Reasons to change Need to change Committment to change Activation Taking steps    Provider Response to Change Talk: E - Evoked more info from patient about behavior change, A - Affirmed patient's thoughts, decisions, or attempts at behavior change, R - Reflected patient's change talk and S - Summarized patient's change talk statements    Assessment / Progress on Treatment Objective(s) / Homework:    Achieved / completed to satisfaction - MAINTENANCE (Working to maintain change, with risk of relapse); Intervened by continuing to positively reinforce healthy behavior choice   New Objective established this session - PREPARATION (Decided to change - considering how); Intervened by negotiating a change plan and determining options / strategies for behavior change, identifying triggers, exploring social supports, and working towards setting a date to begin behavior change  Satisfactory progress - ACTION (Actively working towards change); Intervened by reinforcing change plan / affirming steps taken         Plan: (Homework, other):  Patient was encouraged to continue to seek condition-related information and education, as well as schedule a follow up appointment with the Health  in 5 weeks. Patient has set self-identified goals and will monitor progress until the next appointment.  Scheduled our next coaching session for Tuesday April 4th at 9am.      Jacob Dominguez

## 2017-03-27 DIAGNOSIS — E66.01 MORBID OBESITY DUE TO EXCESS CALORIES (H): Primary | ICD-10-CM

## 2017-03-27 NOTE — TELEPHONE ENCOUNTER
Janet-Phentermine is historical.  Does not look like she has had lately.  Please advise.  Ileana Garsia RN

## 2017-03-27 NOTE — TELEPHONE ENCOUNTER
Controlled Substance Refill Request for Phentermine 37.5mg  Problem List Complete:  No     PROVIDER TO CONSIDER COMPLETION OF PROBLEM LIST AND OVERVIEW/CONTROLLED SUBSTANCE AGREEMENT    Last Written Prescription Date:  02/07/17  Last Fill Quantity: 32,   # refills: 2    Last Office Visit with [facility] primary care provider: 02/15/17    Future Office visit:   Next 5 appointments (look out 90 days)     Apr 04, 2017  9:00 AM CDT   Nurse Only with HEALTH  - [address] ([facility])    [address] [place] [address] [phone]                  Controlled substance agreement on file: No.     Processing:  Fax Rx to [phone] pharmacy   checked in past 6 months?  No, route to RN     Thank you,  Mimi Clemente  [facility]  [phone]

## 2017-03-29 RX ORDER — PHENTERMINE HYDROCHLORIDE 37.5 MG/1
37.5 TABLET ORAL
Qty: 30 TABLET | Refills: 0 | Status: SHIPPED | OUTPATIENT
Start: 2017-03-29 | End: 2017-12-06

## 2017-03-29 NOTE — TELEPHONE ENCOUNTER
Please call and let her know she needs to follow up in one month if she wants to restart the Phentermine.  It will be every 3 month follow up after that.  Also, find out which pharmacy she wants the prescription faxed to.  Janet Castaneda NP  Endocrinology

## 2017-03-29 NOTE — TELEPHONE ENCOUNTER
Patient notified. Advised that she will need a follow up in a month.     Script walked over to the [facility].     Pat Dominguez CMA    3/29/2017  10:12 AM

## 2017-04-14 ENCOUNTER — VIRTUAL VISIT (OUTPATIENT)
Dept: NURSING | Facility: CLINIC | Age: 47
End: 2017-04-14

## 2017-04-14 DIAGNOSIS — E66.01 MORBID OBESITY (H): Primary | ICD-10-CM

## 2017-04-14 DIAGNOSIS — R73.03 PREDIABETES: ICD-10-CM

## 2017-04-14 PROCEDURE — 99207 ZZC HEALTH COACHING, NO CHARGE: CPT

## 2017-04-14 NOTE — MR AVS SNAPSHOT
After Visit Summary   4/14/2017    Keerthi Sloan    MRN: [id number]           Patient Information     Date Of Birth          1970        Visit Information        Provider Department      4/14/2017 9:00 AM HEALTH  - [address]        Today's Diagnoses     Morbid obesity (H)    -  1    Prediabetes           Follow-ups after your visit        Your next 10 appointments already scheduled     May 17, 2017  9:00 AM CDT   Nurse Only with [facility] - [address] ([facility])    [address] [place] [address] [phone]              Who to contact     If you have questions or need follow up information about today's clinic visit or your schedule please contact [facility] directly at [phone].  Normal or non-critical lab and imaging results will be communicated to you by Propelhart, letter or phone within 4 business days after the clinic has received the results. If you do not hear from us within 7 days, please contact the clinic through [employer] or phone. If you have a critical or abnormal lab result, we will notify you by phone as soon as possible.  Submit refill requests through [employer] or call your pharmacy and they will forward the refill request to us. Please allow 3 business days for your refill to be completed.          Additional Information About Your Visit        MyChart Information     [employer] gives you secure access to your electronic health record. If you see a primary care provider, you can also send messages to your care team and make appointments. If you have questions, please call your primary care clinic.  If you do not have a primary care provider, please call [phone] and they will assist you.        Care EveryWhere ID     This is your Care EveryWhere ID. This could be used by other organizations to access your [facility]  records  [id number]         Blood Pressure from Last 3 Encounters:   02/15/17 102/66   01/23/17 126/78   10/03/16 105/74    Weight from Last 3 Encounters:   02/15/17 245 lb (111.1 kg)   01/23/17 259 lb 12.8 oz (117.8 kg)   01/03/17 254 lb 1 oz (115.2 kg)              Today, you had the following     No orders found for display       Primary Care Provider Office Phone # Fax #    Elida Cameron [phone]       [facility] [address] [facility] [address]        Thank you!     Thank you for choosing [facility]  for your care. Our goal is always to provide you with excellent care. Hearing back from our patients is one way we can continue to improve our services. Please take a few minutes to complete the written survey that you may receive in the mail after your visit with us. Thank you!             Your Updated Medication List - Protect others around you: Learn how to safely use, store and throw away your medicines at www.disposemymeds.org.          This list is accurate as of: 4/14/17 10:04 AM.  Always use your most recent med list.                   Brand Name Dispense Instructions for use    Ferrous Sulfate 27 MG Tabs      1 tab QD       metFORMIN 500 MG 24 hr tablet    GLUCOPHAGE-XR    180 tablet    Take 1 tablet (500 mg) by mouth 2 times daily (with meals)       naproxen 250 MG tablet    NAPROSYN     1 tab DAILY prn       omeprazole 40 MG capsule    priLOSEC         oxyCODONE 5 MG IR tablet    ROXICODONE     TK 1 TO 2 TS PO Q 3 H       phentermine 37.5 MG tablet    ADIPEX-P    30 tablet    Take 1 tablet (37.5 mg) by mouth every morning (before breakfast) Start with 1/2 tab q am x 1 week then increase to one tab q am as tolerated.       ranitidine 150 MG tablet    ZANTAC    60 tablet    Take 1 tablet (150 mg) by mouth 2 times daily       topiramate 50 MG tablet    TOPAMAX         vitamin D 83638 UNIT capsule    ERGOCALCIFEROL    12 capsule    Take 1  capsule (50,000 Units) by mouth every 7 days

## 2017-04-14 NOTE — PROGRESS NOTES
April 14, 2017    [address] [id number] [phone]  Health Coaching Progress Note    Patient Name: Keerthi Sloan Date: April 14, 2017      Session Length: 30      DATA    PRM Master Survey Scores Reviewed: Yes    Core Healthy Days Survey:         YAQUELIN Score (Last Two) 6/27/2016   YAQUELIN Raw Score 39   Activation Score 56.4   YAQUELIN Level 3       PHQ-2 Score 10/3/2016 10/2/2016   PHQ-2 Total Score Interpretation - Positive if 3 or more points; Administer PHQ-9 if positive 0 -   MyC PHQ-2 Score - 0       No flowsheet data found.    Treatment Objective(s) Addressed in This Session:  Target Behavior(s): diet/weight loss and disease management/lifestyle changes of making sure to have healthy foods on hand, making healthier dietary choices/not skipping meals/portion size-using pre-portioned containers for work, plans to start journaling food intake, exercise and weight, getting into an exercise routine-going to [facility] with granddaughter, making time for self, tracking steps with fit-bit/needs to get a new one old one stopped working-->shooting for 6,000 daily now, continuing to get used to changing work schedule/finding time to be active, weight loss and maintenance, staying on track, and increasing her water intake!      Current Stressors / Issues:  Unexpected life events-stress eating, still recovering from knee surgery/still limping/working with PT, job has changing hours each month/works nights, getting used to new routine, staying on track with her diet, prepping for cooking/using a plastic pre-portion meal planning container, being more active as able, trip to [place]-got a bit off track-getting back into consistent routine, staying motivated, continued weight loss!    What Patient Does Well:   1) Patient is doing a great job building a new healthy routine-eating healthier/working to pre-plan ahead and pre-prep  foods and be more active/exercise.    Previous Successes:   1) Patient reports additional 4lbs lost on home scale since last checkup with Janet weighing in at  236.2lbs. Patient has now lost 54lbs in our time working together!  Areas in Need of Improvement:   1) Activity level/exercise and consistency of changes  2) Diet modification-eating less/healthier foods  3) Building a healthy routine and maintaining it  Barriers to Change:   1) Works nights-schedule changes monthly  2) Time-making time for self  3) Knee is still recovering/still building strength  4) Recent vacation/other unexpected life events threw off routine  5) Stress eating  Reasons for Change:   1) Be healthier/move better-reduce strain on knees  2) Lose weight/avoid other health complications  3) Be around for granddaughter/kids  Plan/Goal for the Next 4 Weeks:   GOAL #1: Make it a priority to go shopping more often for fresh food items-shop at least 1 time every 2 weeks  GOAL #1 Progress Toward Goal: 100% Maintenance!  GOAL #2: Be more aware of food choices/do not skip meals and snacks throughout the day/use smaller portions-use new plastic pre-portioned cases for lunches  GOAL #2 Progress Toward Goal: 100% Maintenance!  GOAL #3: Start routine of going to the [facility] to exercise at least 2 times a week  GOAL #3 Progress Toward Goal: 75% (Working to make this routine/hopefully less unexpected life events this next month)  GOAL #4: Track daily step count on fit-bit and shoot for 6,000 steps or more each day   GOAL #4 Progress Toward Goal: 50% (Was doing pretty good on this and then fit-bit broke-looking to get a new one)  GOAL #5: Work on logging foods, exercise and weight each day in journal/sheets provided today  GOAL #5 Progress Toward Goal: 0% (Hasn't started yet, but plans to soon!)  GOAL #6: Continue to work on food-prepping/pre-planning ahead for meals  GOAL #6 Progress Toward Goal: 50% (Making progress/wants to try prepping fruit smoothies and  freezing them too)    Intervention:  Motivational Interviewing    MI Intervention: Expressed Empathy/Understanding, Supported Autonomy, Collaboration, Evocation, Permission to raise concern or advise, Open-ended questions, Reflections: simple and complex and Change talk (evoked)     Change Talk Expressed by the Patient: Desire to change Ability to change Reasons to change Need to change Committment to change Activation Taking steps    Provider Response to Change Talk: E - Evoked more info from patient about behavior change, A - Affirmed patient's thoughts, decisions, or attempts at behavior change, R - Reflected patient's change talk and S - Summarized patient's change talk statements    Assessment / Progress on Treatment Objective(s) / Homework:    Achieved / completed to satisfaction - MAINTENANCE (Working to maintain change, with risk of relapse); Intervened by continuing to positively reinforce healthy behavior choice   Minimal progress - PREPARATION (Decided to change - considering how); Intervened by negotiating a change plan and determining options / strategies for behavior change, identifying triggers, exploring social supports, and working towards setting a date to begin behavior change  Satisfactory progress - ACTION (Actively working towards change); Intervened by reinforcing change plan / affirming steps taken         Plan: (Homework, other):  Patient was encouraged to continue to seek condition-related information and education, as well as schedule a follow up appointment with the Health  in 5 weeks. Patient has set self-identified goals and will monitor progress until the next appointment.  Scheduled our next coaching session for Wednesday May 17th at 9am at the [facility].      Jacob Dominguez

## 2017-04-17 DIAGNOSIS — E55.9 VITAMIN D DEFICIENCY: ICD-10-CM

## 2017-04-17 NOTE — TELEPHONE ENCOUNTER
Vitamin D 50,000      Last Written Prescription Date: 01/18/2017  Last Fill Quantity: 12,01/18/2017  # refills: 0   Last Office Visit with [facility], [facility] or [facility] prescribing provider: 01/23/2017-Janet Castaneda                                         Next 5 appointments (look out 90 days)     May 17, 2017  9:00 AM CDT   Nurse Only with HEALTH  - REGION [address] [place] [address] ([facility])    [address] [place] [address] [phone]

## 2017-04-18 RX ORDER — ERGOCALCIFEROL 1.25 MG/1
50000 CAPSULE, LIQUID FILLED ORAL
Qty: 12 CAPSULE | Refills: 0 | Status: SHIPPED | OUTPATIENT
Start: 2017-04-18 | End: 2017-07-16

## 2017-04-18 NOTE — TELEPHONE ENCOUNTER
Recheck Vit D lab when?  1/23/17 visit advises 6 month RTC.  Please advise.  Ileana Garsia RN      Entered by Janet Castaneda APRN CNP at 1/25/2017  9:09 PM   Read by Keerthi Sloan at 1/26/2017  8:05 PM   Keerthi,   Your iron levels and vitamin D are about the same.   I would continue the vitamin D 50,000 IU once weekly.   Maybe you need a higher iron dose? Check with your primary care provider.   Your A1c (3 month blood sugar average) has increased again since being off the Metformin, 5.5% to 5.7%.   I think it is good to restart the Metformin.   Let me know if you have any questions.   Take care,   Janet Castaneda NP   Endocrinology

## 2017-04-18 NOTE — TELEPHONE ENCOUNTER
Ok to wait until follow up visit the end of June or early July to recheck ANTONIO Castaneda NP  Endocrinology

## 2017-05-17 ENCOUNTER — ALLIED HEALTH/NURSE VISIT (OUTPATIENT)
Dept: NURSING | Facility: CLINIC | Age: 47
End: 2017-05-17

## 2017-05-17 VITALS — BODY MASS INDEX: 38.12 KG/M2 | WEIGHT: 243.38 LBS

## 2017-05-17 DIAGNOSIS — E66.01 MORBID OBESITY (H): Primary | ICD-10-CM

## 2017-05-17 PROCEDURE — 99207 ZZC HEALTH COACHING, NO CHARGE: CPT

## 2017-05-17 NOTE — MR AVS SNAPSHOT
After Visit Summary   5/17/2017    Keerthi Sloan    MRN: [id number]           Patient Information     Date Of Birth          1970        Visit Information        Provider Department      5/17/2017 9:00 AM HEALTH  - [facility]        Today's Diagnoses     Morbid obesity (H)    -  1       Follow-ups after your visit        Who to contact     If you have questions or need follow up information about today's clinic visit or your schedule please contact [facility] directly at [phone].  Normal or non-critical lab and imaging results will be communicated to you by Monkey Analyticshart, letter or phone within 4 business days after the clinic has received the results. If you do not hear from us within 7 days, please contact the clinic through Securenst or phone. If you have a critical or abnormal lab result, we will notify you by phone as soon as possible.  Submit refill requests through FireHost or call your pharmacy and they will forward the refill request to us. Please allow 3 business days for your refill to be completed.          Additional Information About Your Visit        MyChart Information     FireHost gives you secure access to your electronic health record. If you see a primary care provider, you can also send messages to your care team and make appointments. If you have questions, please call your primary care clinic.  If you do not have a primary care provider, please call [phone] and they will assist you.        Care EveryWhere ID     This is your Care EveryWhere ID. This could be used by other organizations to access your [facility] medical records  [id number]        Your Vitals Were     BMI (Body Mass Index)                   38.12 kg/m2            Blood Pressure from Last 3 Encounters:   02/15/17 102/66   01/23/17 126/78   10/03/16 105/74    Weight from Last 3 Encounters:   05/17/17 243 lb 6 oz (110.4 kg)   02/15/17 245 lb (111.1 kg)    01/23/17 259 lb 12.8 oz (117.8 kg)              Today, you had the following     No orders found for display       Primary Care Provider Office Phone # Fax #    Elida Cameron [phone]       [facility] [address]  [facility] [address]        Thank you!     Thank you for choosing [facility]  for your care. Our goal is always to provide you with excellent care. Hearing back from our patients is one way we can continue to improve our services. Please take a few minutes to complete the written survey that you may receive in the mail after your visit with us. Thank you!             Your Updated Medication List - Protect others around you: Learn how to safely use, store and throw away your medicines at www.disposemymeds.org.          This list is accurate as of: 5/17/17 11:04 AM.  Always use your most recent med list.                   Brand Name Dispense Instructions for use    Ferrous Sulfate 27 MG Tabs      1 tab QD       metFORMIN 500 MG 24 hr tablet    GLUCOPHAGE-XR    180 tablet    Take 1 tablet (500 mg) by mouth 2 times daily (with meals)       naproxen 250 MG tablet    NAPROSYN     1 tab DAILY prn       omeprazole 40 MG capsule    priLOSEC         oxyCODONE 5 MG IR tablet    ROXICODONE     TK 1 TO 2 TS PO Q 3 H       phentermine 37.5 MG tablet    ADIPEX-P    30 tablet    Take 1 tablet (37.5 mg) by mouth every morning (before breakfast) Start with 1/2 tab q am x 1 week then increase to one tab q am as tolerated.       ranitidine 150 MG tablet    ZANTAC    60 tablet    Take 1 tablet (150 mg) by mouth 2 times daily       topiramate 50 MG tablet    TOPAMAX         vitamin D 80615 UNIT capsule    ERGOCALCIFEROL    12 capsule    Take 1 capsule (50,000 Units) by mouth every 7 days

## 2017-05-17 NOTE — NURSING NOTE
May 17, 2017    [facility]  [address] [place] [id number] [phone]  Health Coaching Progress Note    Patient Name: Keerthi Sloan Date: May 17, 2017      Session Length: 30      DATA    PRM Master Survey Scores Reviewed: Yes    Core Healthy Days Survey:    Would you say that in general your health is: : Fair    YAQUELIN Score (Last Two) 6/27/2016 5/17/2017   YAQUELIN Raw Score 39 33   Activation Score 56.4 65.8   YAQUELIN Level 3 3       PHQ-2 Score 10/3/2016 10/2/2016   PHQ-2 Total Score Interpretation - Positive if 3 or more points; Administer PHQ-9 if positive 0 -   MyC PHQ-2 Score - 0       No flowsheet data found.    Treatment Objective(s) Addressed in This Session:  Target Behavior(s): diet/weight loss and disease management/lifestyle changes of making sure to have healthy foods on hand, making healthier dietary choices/not skipping meals/portion size-using pre-portioned containers for work, plans to start journaling food intake, exercise and weight, getting into an exercise routine-going to [facility] with granddaughter, making time for self, tracking steps with fit-bit-->shooting for 11,000 daily now, continuing to get used to changing work schedule/finding time to be active, weight loss and maintenance, staying on track, and increasing her water intake!      Current Stressors / Issues:  Unexpected life events-stress eating, financial challenges, not knowing if granddaughter is staying with her for summer of going back to live with mom in [place], still recovering from knee surgery/still limping/working with PT, job has changing hours each month/works nights, getting used to new routine, staying on track with her diet, prepping for cooking/using a plastic pre-portion meal planning container, being more active as able, trip to [place]-got a bit off track-getting back into consistent routine, staying motivated, continued weight  loss!     What Patient Does Well:   1) Patient is doing a great job building a new healthy routine-eating healthier/working to pre-plan ahead and pre-prep foods and be more active/exercise.   Previous Successes:   1) Patient weighs in at 243lbs today, after having a rough month and gaining some reported weight back.  Still down 47lbs and feeling much better!  Areas in Need of Improvement:   1) Activity level/exercise and consistency of changes  2) Diet modification-eating less/healthier foods  3) Building a healthy routine and maintaining it  Barriers to Change:   1) Works nights-schedule changes monthly  2) Time-making time for self  3) Knee is still recovering/still building strength  4) Unexpected life events threw off routine  5) Stress eating  Reasons for Change:   1) Be healthier/move better-reduce strain on knees  2) Lose weight/avoid other health complications  3) Be around for granddaughter/kids  Plan/Goal for the Next 4 Weeks:   GOAL #1: Make it a priority to go shopping more often for fresh food items-shop at least 1 time every 2 weeks  GOAL #1 Progress Toward Goal:  100% Maintenance  GOAL #2: Be more aware of food choices/do not skip meals and snacks throughout the day/use smaller portions-use new plastic pre-portioned cases for lunches  GOAL #2 Progress Toward Goal: 100% Maintenance  GOAL #3: Start routine of going to the [facility] to exercise at least 2 times a week  GOAL #3 Progress Toward Goal: 75% (Has done this a few times, but needs to make it more consistent)  GOAL #4: Track daily step count on fit-bit and shoot for 6,000 steps or more each day   GOAL #4 Progress Toward Goal: 100% Completed  GOAL #5: Work on logging foods, exercise and weight each day in journal/sheets provided today  GOAL #5 Progress Toward Goal: 0% (Hasn't done this yet, but plans too!)  GOAL #6: Continue to work on food-prepping/pre-planning ahead for meals  GOAL #6 Progress Toward Goal: 75% (Working to be better at this)  GOAL #7:  Track daily step count on fit-bit and shoot for 11,000 steps or more each day   GOAL #7 Progress Toward Goal: 0% New Goal    Intervention:  Motivational Interviewing    MI Intervention: Co-Developed Goal: increasing step count, Expressed Empathy/Understanding, Supported Autonomy, Collaboration, Evocation, Permission to raise concern or advise, Open-ended questions, Reflections: simple and complex and Change talk (evoked)     Change Talk Expressed by the Patient: Desire to change Ability to change Reasons to change Need to change Committment to change Activation Taking steps    Provider Response to Change Talk: E - Evoked more info from patient about behavior change, A - Affirmed patient's thoughts, decisions, or attempts at behavior change, R - Reflected patient's change talk and S - Summarized patient's change talk statements    Assessment / Progress on Treatment Objective(s) / Homework:    Achieved / completed to satisfaction - MAINTENANCE (Working to maintain change, with risk of relapse); Intervened by continuing to positively reinforce healthy behavior choice   New Objective established this session - PREPARATION (Decided to change - considering how); Intervened by negotiating a change plan and determining options / strategies for behavior change, identifying triggers, exploring social supports, and working towards setting a date to begin behavior change  Satisfactory progress - ACTION (Actively working towards change); Intervened by reinforcing change plan / affirming steps taken         Plan: (Homework, other):  Patient was encouraged to continue to seek condition-related information and education, as well as schedule a follow up appointment with the Health  as needed. Patient has set self-identified goals and will monitor progress until the next appointment.  Patient has completed her final coaching session today, but has expressed interest in further coaching.   will give pt. 1-2 months to work on  things and will the outreach to potentially set-up future coaching sessions.    Jacob Dominguez

## 2017-06-26 ENCOUNTER — TELEPHONE (OUTPATIENT)
Dept: NURSING | Facility: CLINIC | Age: 47
End: 2017-06-26

## 2017-07-16 DIAGNOSIS — E55.9 VITAMIN D DEFICIENCY: ICD-10-CM

## 2017-07-17 NOTE — TELEPHONE ENCOUNTER
Routing refill request to provider for review/approval because:  Drug dose not on the [facility] refill protocol       Allegra Reddy RN - BC

## 2017-07-17 NOTE — TELEPHONE ENCOUNTER
vitamin D (ERGOCALCIFEROL) 89088 UNIT capsule        Last Written Prescription Date: 4/18/2017  Last Fill Quantity: 12,    # refills: 0  Last Office Visit with [facility], [facility] or  Health prescribing provider:  2/15/2017   Next 5 appointments (look out 90 days)     Jul 24, 2017  9:00 AM CDT   Telephone Visit with HEALTH  - REGION [address] [place] [address] ([facility])    [address] [place] [phone] [phone]                   Lab Results   Component Value Date    WBC 6.7 01/23/2017     Lab Results   Component Value Date    RBC 5.12 01/23/2017     Lab Results   Component Value Date    HGB 12.2 01/23/2017     Lab Results   Component Value Date    HCT 39.0 01/23/2017     No components found for: MCT  Lab Results   Component Value Date    MCV 76 01/23/2017     Lab Results   Component Value Date    MCH 23.8 01/23/2017     Lab Results   Component Value Date    MCHC 31.3 01/23/2017     Lab Results   Component Value Date    RDW 17.7 01/23/2017     Lab Results   Component Value Date     01/23/2017     Lab Results   Component Value Date    AST 9 01/23/2017     Lab Results   Component Value Date    ALT 14 01/23/2017     Creatinine   Date Value Ref Range Status   01/23/2017 0.66 0.52 - 1.04 mg/dL Final

## 2017-07-18 RX ORDER — ERGOCALCIFEROL 1.25 MG/1
CAPSULE, LIQUID FILLED ORAL
Qty: 12 CAPSULE | Refills: 0 | Status: SHIPPED | OUTPATIENT
Start: 2017-07-18 | End: 2017-12-06

## 2017-07-24 ENCOUNTER — VIRTUAL VISIT (OUTPATIENT)
Dept: NURSING | Facility: CLINIC | Age: 47
End: 2017-07-24

## 2017-07-24 DIAGNOSIS — E66.01 MORBID OBESITY (H): Primary | ICD-10-CM

## 2017-07-24 PROCEDURE — 99207 ZZC HEALTH COACHING, NO CHARGE: CPT

## 2017-07-24 NOTE — PROGRESS NOTES
July 24, 2017    [facility]  [address] [place] [id number] [phone]  Health Coaching Progress Note    Patient Name: Keerthi Sloan Date: July 24, 2017      Session Length: 45      DATA    PRM Master Survey Scores Reviewed: Yes    Core Healthy Days Survey:    Would you say that in general your health is: : Fair    YAQUELIN Score (Last Two) 5/17/2017 7/24/2017   YAUQELIN Raw Score 33 32   Activation Score 65.8 62.6   YAQUELIN Level 3 3       PHQ-2 Score 7/24/2017 10/3/2016   PHQ-2 Total Score Interpretation - Positive if 3 or more points; Administer PHQ-9 if positive 0 0   MyC PHQ-2 Score - -       No flowsheet data found.    Treatment Objective(s) Addressed in This Session:  Target Behavior(s): diet/weight loss and disease management/lifestyle changes of making sure to have healthy foods on hand and pre-planning meals ahead of time, making healthier dietary choices/not skipping meals/portion size-using pre-portioned containers for work, getting into an exercise routine-going to [employer] and checking out water aerobics classes, making time for self, tracking steps with fit-bit-->shooting for 6,000 daily now, continuing to get used to changing work schedule/finding time to be active, and weight loss and maintenance.      Current Stressors / Issues:  Granddaughter moved out and mom moved in/other nexpected life events-stress eating, still recovering from knee surgery/working with PT, job has changing hours each month/works nights, getting used to new routine, staying on track with her diet, prepping for cooking/using a plastic pre-portion meal planning container, being more active as able, sticking with her routine, staying motivated, continued weight loss!     What Patient Does Well:   1) Patient is doing a great job building a new healthy routine-eating healthier/working to pre-plan ahead and pre-prep foods and be more active/exercise.     Previous Successes:   1) Patient's last weight in was at about 243.6lbs with patient already losing about 50lbs in the last year!  Areas in Need of Improvement:   1) Activity level/exercise and consistency of changes  2) Diet modification-eating less/healthier foods  3) Building a healthy routine and maintaining it  Barriers to Change:   1) Works nights-schedule changes monthly  2) Time-making time for self  3) Knee is still recovering/still building strength-back in PT  4) Recent life changes-granddaughter moved out and her mom moved in/unexpected life events threw off routine  5) Stress eating  Reasons for Change:   1) Be healthier/move better-reduce strain on knees  2) Lose weight/avoid other health complications  3) Be around for grandkids/kids  Plan/Goal for the Next 4 Weeks:   GOAL #1: Get into a regular gym routine each week and try outsome water aerobics classes  GOAL #1 Progress Toward Goal: 0% New Goal  GOAL #2: Focus on meal planning ahead of time and try to make healthy choices with the correct portion size  GOAL #2 Progress Toward Goal: 0% New Goal  GOAL #3: Take time away from busy day-to-day routine and have some relaxation time for yourself  GOAL #3 Progress Toward Goal: 0% New Goal  GOAL #4: Track daily step count using fit-bit and aim for at least 6,000 daily steps  GOAL #4 Progress Toward Goal: 0% New Goal    Intervention:  Motivational Interviewing    MI Intervention: Expressed Empathy/Understanding, Supported Autonomy, Collaboration, Evocation, Permission to raise concern or advise, Open-ended questions, Reflections: simple and complex and Change talk (evoked)     Change Talk Expressed by the Patient: Desire to change Ability to change Reasons to change Need to change Committment to change Activation Taking steps    Provider Response to Change Talk: E - Evoked more info from patient about behavior change, A - Affirmed patient's thoughts, decisions, or attempts at behavior change, R - Reflected  patient's change talk and S - Summarized patient's change talk statements    Assessment / Progress on Treatment Objective(s) / Homework:    New Objective established this session - PREPARATION (Decided to change - considering how); Intervened by negotiating a change plan and determining options / strategies for behavior change, identifying triggers, exploring social supports, and working towards setting a date to begin behavior change         Plan: (Homework, other):  Patient was encouraged to continue to seek condition-related information and education, as well as schedule a follow up appointment with the Health  in 5 weeks. Patient has set self-identified goals and will monitor progress until the next appointment.  Scheduled our next coaching session for Tuesday August 29th at 9am.      Jacob Dominguez

## 2017-07-24 NOTE — PATIENT INSTRUCTIONS
July 24, 2017    [address] [address]  [phone]  Health Coaching Progress Note    Patient Name: Keerthi Sloan Date: July 24, 2017      Plan/Goal for the Next 4 Weeks:   GOAL #1: Get into a regular gym routine each week and try outsome water aerobics classes  GOAL #1 Progress Toward Goal: 0% New Goal  GOAL #2: Focus on meal planning ahead of time and try to make healthy choices with the correct portion size  GOAL #2 Progress Toward Goal: 0% New Goal  GOAL #3: Take time away from busy day-to-day routine and have some relaxation time for yourself  GOAL #3 Progress Toward Goal: 0% New Goal  GOAL #4: Track daily step count using fit-bit and aim for at least 6,000 daily steps  GOAL #4 Progress Toward Goal: 0% New Goal      Plan: (Homework, other):  Patient was encouraged to continue to seek condition-related information and education, as well as schedule a follow up appointment with the Health  in 5 weeks. Patient has set self-identified goals and will monitor progress until the next appointment.  Scheduled our next coaching call for Tuesday August 29th at 9am.  Jacob Dominguez       Resources:

## 2017-07-24 NOTE — MR AVS SNAPSHOT
After Visit Summary   7/24/2017    Keerthi Sloan    MRN: [id number]           Patient Information     Date Of Birth          1970        Visit Information        Provider Department      7/24/2017 9:00 AM HEALTH  - [facility] 5 [facility]        Today's Diagnoses     Morbid obesity (H)    -  1      Care Instructions      July 24, 2017    [facility]  [address] [facility] [address]  [phone]  [phone]  Health Coaching Progress Note    Patient Name: Keerthi Sloan Date: July 24, 2017      Plan/Goal for the Next 4 Weeks:   GOAL #1: Get into a regular gym routine each week and try outsome water aerobics classes  GOAL #1 Progress Toward Goal: 0% New Goal  GOAL #2: Focus on meal planning ahead of time and try to make healthy choices with the correct portion size  GOAL #2 Progress Toward Goal: 0% New Goal  GOAL #3: Take time away from busy day-to-day routine and have some relaxation time for yourself  GOAL #3 Progress Toward Goal: 0% New Goal  GOAL #4: Track daily step count using fit-bit and aim for at least 6,000 daily steps  GOAL #4 Progress Toward Goal: 0% New Goal      Plan: (Homework, other):  Patient was encouraged to continue to seek condition-related information and education, as well as schedule a follow up appointment with the Health  in 5 weeks. Patient has set self-identified goals and will monitor progress until the next appointment.  Scheduled our next coaching call for Tuesday August 29th at 9am.  Jacob Dominguez       Resources:                Follow-ups after your visit        Your next 10 appointments already scheduled     Aug 29, 2017  9:00 AM CDT   Telephone Visit with HEALTH  - [facility] 5   [facility] ([facility]    [address] [facility] [id number] [phone]           Note: this is not an onsite visit;  there is no need to come to the facility.              Who to contact     If you have questions or need follow up information about today's clinic visit or your schedule please contact [facility] directly at [phone].  Normal or non-critical lab and imaging results will be communicated to you by MyChart, letter or phone within 4 business days after the clinic has received the results. If you do not hear from us within 7 days, please contact the clinic through MyChart or phone. If you have a critical or abnormal lab result, we will notify you by phone as soon as possible.  Submit refill requests through Tinychat or call your pharmacy and they will forward the refill request to us. Please allow 3 business days for your refill to be completed.          Additional Information About Your Visit        BlendspaceharPlaynery Information     Tinychat gives you secure access to your electronic health record. If you see a primary care provider, you can also send messages to your care team and make appointments. If you have questions, please call your primary care clinic.  If you do not have a primary care provider, please call [phone] and they will assist you.        Care EveryWhere ID     This is your Care EveryWhere ID. This could be used by other organizations to access your [facility] medical records  [id number]         Blood Pressure from Last 3 Encounters:   02/15/17 102/66   01/23/17 126/78   10/03/16 105/74    Weight from Last 3 Encounters:   05/17/17 243 lb 6 oz (110.4 kg)   02/15/17 245 lb (111.1 kg)   01/23/17 259 lb 12.8 oz (117.8 kg)              Today, you had the following     No orders found for display       Primary Care Provider Office Phone # Fax #    Elidashirley Cameron [phone]       [facility] [address]  [facility] [address]        Equal Access to Services     MITCHELL LEMON : Krishna Tenorio, theodore quevedo, carlos manuel myers,  holli grangershirley jolley'aan ah. So [facility] [phone].    ATENCIÓN: Si habla manuel, tiene a riggs disposición servicios gratuitos de asistencia lingüística. Frankie odell [phone].    We comply with applicable federal civil rights laws and [place] laws. We do not discriminate on the basis of race, color, national origin, age, disability sex, sexual orientation or gender identity.            Thank you!     Thank you for choosing [facility]  for your care. Our goal is always to provide you with excellent care. Hearing back from our patients is one way we can continue to improve our services. Please take a few minutes to complete the written survey that you may receive in the mail after your visit with us. Thank you!             Your Updated Medication List - Protect others around you: Learn how to safely use, store and throw away your medicines at www.disposemymeds.org.          This list is accurate as of: 7/24/17 11:11 AM.  Always use your most recent med list.                   Brand Name Dispense Instructions for use Diagnosis    Ferrous Sulfate 27 MG Tabs      1 tab QD        metFORMIN 500 MG 24 hr tablet    GLUCOPHAGE-XR    180 tablet    Take 1 tablet (500 mg) by mouth 2 times daily (with meals)    Prediabetes, Vitamin D deficiency       naproxen 250 MG tablet    NAPROSYN     1 tab DAILY prn        omeprazole 40 MG capsule    priLOSEC          oxyCODONE 5 MG IR tablet    ROXICODONE     TK 1 TO 2 TS PO Q 3 H        phentermine 37.5 MG tablet    ADIPEX-P    30 tablet    Take 1 tablet (37.5 mg) by mouth every morning (before breakfast) Start with 1/2 tab q am x 1 week then increase to one tab q am as tolerated.    Morbid obesity due to excess calories (H)       ranitidine 150 MG tablet    ZANTAC    60 tablet    Take 1 tablet (150 mg) by mouth 2 times daily    Gastroesophageal reflux disease, esophagitis presence not specified       topiramate 50 MG tablet    TOPAMAX          vitamin D  24031 UNIT capsule    ERGOCALCIFEROL    12 capsule    TAKE 1 CAPSULE BY MOUTH EVERY 7 DAYS    Vitamin D deficiency

## 2017-08-29 ENCOUNTER — VIRTUAL VISIT (OUTPATIENT)
Dept: NURSING | Facility: CLINIC | Age: 47
End: 2017-08-29

## 2017-08-29 DIAGNOSIS — R73.03 PREDIABETES: ICD-10-CM

## 2017-08-29 DIAGNOSIS — E66.01 MORBID OBESITY (H): Primary | ICD-10-CM

## 2017-08-29 PROCEDURE — 99207 ZZC HEALTH COACHING, NO CHARGE: CPT

## 2017-08-29 NOTE — PROGRESS NOTES
August 29, 2017    [facility]  [address] [facility] [id number] [phone]  Health Coaching Progress Note    Patient Name: Keerthi Sloan Date: August 29, 2017      Session Length: 30      DATA    PRM Master Survey Scores Reviewed: Yes    Core Healthy Days Survey:         YAQUELIN Score (Last Two) 5/17/2017 7/24/2017   YAQUELIN Raw Score 33 32   Activation Score 65.8 62.6   YAQUELIN Level 3 3       PHQ-2 Score 7/24/2017 10/3/2016   PHQ-2 Total Score Interpretation - Positive if 3 or more points; Administer PHQ-9 if positive 0 0   MyC PHQ-2 Score - -       No flowsheet data found.    Treatment Objective(s) Addressed in This Session:  Target Behavior(s): diet/weight loss and disease management/lifestyle changes of making sure to have healthy foods on hand and pre-planning meals ahead of time, making healthier dietary choices/not skipping meals/portion size-using pre-portioned containers for work, getting into an exercise routine-going to [place] and checking out water aerobics classes, making time for self, tracking steps with fit-bit-->shooting for 6,000 daily now, continuing to get used to changing work schedule/finding time to be active, and weight loss and maintenance.      Current Stressors / Issues:  Granddaughter moved out and mom moved in/other unexpected life events-stress eating, mom has been cooking good comfort food-not so healthy, pt. Hasn't been as active lately, still building strength after knee surgery, job has changing hours each month/works nights, getting used to new routine, staying on track with her diet, getting into gym routine, prepping for cooking/using a plastic pre-portion meal planning container, being more active as able, staying motivated, continued weight loss!      What Patient Does Well:   1) Patient's life has been hectic lately with a lot of change-had a wake up call when she stepped on scale today-motivated  to get back on track.  Previous Successes:   1) Patient's last weigh in at clinic was at about 243.6lbs with patient already losing about 50lbs in the last year, but reports she has since gained some of the weight back-needs to get back on track!  Areas in Need of Improvement:   1) Activity level/exercise and consistency of changes  2) Diet modification-eating less/healthier foods  3) Building a healthy routine and maintaining it  Barriers to Change:   1) Works nights-schedule changes monthly  2) Time-making time for self  3) Knee is still recovering/still building strength  4) Recent life changes-granddaughter moved out and her mom moved in/unexpected life events threw off routine  5) Stress eating  Reasons for Change:   1) Be healthier/move better-reduce strain on knees  2) Lose weight/avoid other health complications  3) Be around for grandkids/kids  Plan/Goal for the Next 4 Weeks:   GOAL #1: Get into a regular gym routine at [facility] each week and try outsome water aerobics classes  GOAL #1 Progress Toward Goal: 25% (Has gone a few times, but wants to be more regular with routine)  GOAL #2: Focus on meal planning ahead of time and try to make healthy choices with the correct portion size  GOAL #2 Progress Toward Goal: 25% (Mom is living with her and has been cooking-pt. Feels she has been eating too good-needs to refocus)  GOAL #3: Take time away from busy day-to-day routine and have some relaxation time for yourself  GOAL #3 Progress Toward Goal: 25% (Still tries to make this a priority-feels she can do better)  GOAL #4: Track daily step count using fit-bit and aim for at least 6,000 daily steps  GOAL #4 Progress Toward Goal: 50% (using phone or fit-bit and still shoots for about 6,000 daily but has been closer to [id number] to keep working on this)    Intervention:  Motivational Interviewing     MI Intervention: Expressed Empathy/Understanding, Supported Autonomy, Collaboration, Evocation, Permission to raise  concern or advise, Open-ended questions, Reflections: simple and complex, Rolled with resistance: Simple reflection, Complex reflection, Shifted topic to defuse resistance and Reframed sustain talk in the direction of change and Change talk (evoked)     Change Talk Expressed by the Patient: Desire to change Ability to change Reasons to change Need to change Committment to change Activation Taking steps    Provider Response to Change Talk: E - Evoked more info from patient about behavior change, A - Affirmed patient's thoughts, decisions, or attempts at behavior change, R - Reflected patient's change talk and S - Summarized patient's change talk statements    Assessment / Progress on Treatment Objective(s) / Homework:  Minimal progress - PREPARATION (Decided to change - considering how); Intervened by negotiating a change plan and determining options / strategies for behavior change, identifying triggers, exploring social supports, and working towards setting a date to begin behavior change  Worsening - RELAPSE (Returned to unhealthy behavior); Intervened by reassessing readiness to change and identifying appropriate stage.  Identified reasons for relapse, successes, and change talk         Plan: (Homework, other):  Patient was encouraged to continue to seek condition-related information and education, as well as schedule a follow up appointment with the Health  in 4 weeks. Patient has set self-identified goals and will monitor progress until the next appointment.  Scheduled our next coaching session for Wednesday September 27th at 9am.      Jacob Dominguez

## 2017-08-29 NOTE — MR AVS SNAPSHOT
After Visit Summary   8/29/2017    Keerthi Sloan    MRN: [id number]           Patient Information     Date Of Birth          1970        Visit Information        Provider Department      8/29/2017 9:00 AM HEALTH  - [address]        Today's Diagnoses     Morbid obesity (H)    -  1    Prediabetes           Follow-ups after your visit        Your next 10 appointments already scheduled     Sep 27, 2017  9:00 AM CDT   Telephone Visit with [facility]  - [address] ([facility])    [address] [place] [id number] [phone]           Note: this is not an onsite visit; there is no need to come to the facility.              Who to contact     If you have questions or need follow up information about today's clinic visit or your schedule please contact [facility] directly at [phone].  Normal or non-critical lab and imaging results will be communicated to you by MyChart, letter or phone within 4 business days after the clinic has received the results. If you do not hear from us within 7 days, please contact the clinic through One[employer] or phone. If you have a critical or abnormal lab result, we will notify you by phone as soon as possible.  Submit refill requests through [employer] or call your pharmacy and they will forward the refill request to us. Please allow 3 business days for your refill to be completed.          Additional Information About Your Visit        MyChart Information     [employer] gives you secure access to your electronic health record. If you see a primary care provider, you can also send messages to your care team and make appointments. If you have questions, please call your primary care clinic.  If you do not have a primary care provider, please call [phone] and they will assist you.        Care EveryWhere ID     This is your Care EveryWhere ID. This  could be used by other organizations to access your [place] medical records  [id number]         Blood Pressure from Last 3 Encounters:   02/15/17 102/66   01/23/17 126/78   10/03/16 105/74    Weight from Last 3 Encounters:   05/17/17 243 lb 6 oz (110.4 kg)   02/15/17 245 lb (111.1 kg)   01/23/17 259 lb 12.8 oz (117.8 kg)              Today, you had the following     No orders found for display       Primary Care Provider Office Phone # Fax #    Elida Cameron [phone] [address] [address] [facility] [address]        Equal Access to Services     MITCHELL LEMON : Krishna Tenorio, waterry quevedo, carlos manuel kaalmada louis, holli garcia. So [facility] [phone].    ATENCIÓN: Si habla español, tiene a riggs disposición servicios gratuitos de asistencia lingüística. Llame al [phone].    We comply with applicable federal civil rights laws and [place] laws. We do not discriminate on the basis of race, color, national origin, age, disability sex, sexual orientation or gender identity.            Thank you!     Thank you for choosing [facility]  for your care. Our goal is always to provide you with excellent care. Hearing back from our patients is one way we can continue to improve our services. Please take a few minutes to complete the written survey that you may receive in the mail after your visit with us. Thank you!             Your Updated Medication List - Protect others around you: Learn how to safely use, store and throw away your medicines at www.disposemymeds.org.          This list is accurate as of: 8/29/17 10:23 AM.  Always use your most recent med list.                   Brand Name Dispense Instructions for use Diagnosis    Ferrous Sulfate 27 MG Tabs      1 tab QD        metFORMIN 500 MG 24 hr tablet    GLUCOPHAGE-XR    180 tablet    Take 1 tablet (500 mg) by mouth 2 times daily (with meals)    Prediabetes, Vitamin D  deficiency       naproxen 250 MG tablet    NAPROSYN     1 tab DAILY prn        omeprazole 40 MG capsule    priLOSEC          oxyCODONE 5 MG IR tablet    ROXICODONE     TK 1 TO 2 TS PO Q 3 H        phentermine 37.5 MG tablet    ADIPEX-P    30 tablet    Take 1 tablet (37.5 mg) by mouth every morning (before breakfast) Start with 1/2 tab q am x 1 week then increase to one tab q am as tolerated.    Morbid obesity due to excess calories (H)       ranitidine 150 MG tablet    ZANTAC    60 tablet    Take 1 tablet (150 mg) by mouth 2 times daily    Gastroesophageal reflux disease, esophagitis presence not specified       topiramate 50 MG tablet    TOPAMAX          vitamin D 03900 UNIT capsule    ERGOCALCIFEROL    12 capsule    TAKE 1 CAPSULE BY MOUTH EVERY 7 DAYS    Vitamin D deficiency

## 2017-09-27 ENCOUNTER — VIRTUAL VISIT (OUTPATIENT)
Dept: NURSING | Facility: CLINIC | Age: 47
End: 2017-09-27

## 2017-09-27 DIAGNOSIS — R73.03 PREDIABETES: ICD-10-CM

## 2017-09-27 DIAGNOSIS — E66.01 MORBID OBESITY (H): Primary | ICD-10-CM

## 2017-09-27 PROCEDURE — 99207 ZZC HEALTH COACHING, NO CHARGE: CPT

## 2017-09-27 NOTE — PROGRESS NOTES
September 27, 2017    [facility]  [address] [place] [id number] [phone]  Health Coaching Progress Note    Patient Name: Keerthi Sloan Date: September 27, 2017      Session Length: 30      DATA    PRM Master Survey Scores Reviewed: Yes    Core Healthy Days Survey:         YAQUELIN Score (Last Two) 5/17/2017 7/24/2017   YAQUELIN Raw Score 33 32   Activation Score 65.8 62.6   YAQUELIN Level 3 3       PHQ-2 Score 7/24/2017 10/3/2016   PHQ-2 Total Score Interpretation - Positive if 3 or more points; Administer PHQ-9 if positive 0 0   MyC PHQ-2 Score - -       No flowsheet data found.    Treatment Objective(s) Addressed in This Session:  Target Behavior(s): diet/weight loss and disease management/lifestyle changes of making sure to have healthy foods on hand and pre-planning meals ahead of time, making healthier dietary choices/not skipping meals/portion size-using pre-portioned containers for work, using a journal to stay honest on diet/exercise, getting into an exercise routine-going to [facility] 3 times per week, making time for self/relaxation, tracking steps with fit-bit-->shooting for 6,000 daily now, continuing to get used to changing work schedule/finding time to be active, weight loss and maintenance and scheduling attending follow-ups-Janet Castaneda (Jenny).      Current Stressors / Issues:   Starting a journal-recording diet/exercise/mood, work scheduled just changed again, mom has been cooking good comfort food-not so healthy, still building strength after knee surgery, job has changing hours each month/works nights, getting used to new routine, staying on track with her diet, getting into gym routine-3x a week, prepping for cooking/using a plastic pre-portion meal planning container, being more active as able, staying motivated, continued weight loss!      What Patient Does Well:   1) Patient is working hard to get back on track with  a regular exercise routine and better eating habits-continues to learn what works and what doesn't  Previous Successes:   1) Patient's last weigh in at clinic was at about 243.6lbs with patient already losing about 50lbs in the last year, but reports she has since gained some of the weight back-working to get back on track wit her routine!  Areas in Need of Improvement:   1) Activity level/exercise and consistency of exercise  2) Diet modification-eating less/healthier foods  3) Building a healthy routine and maintaining it  Barriers to Change:   1) Works nights-schedule changes monthly  2) Time-making time for self  3) Knee is still recovering/still building strength  4) Trying to set up a regular routine  5) Stress eating  Reasons for Change:   1) Be healthier/move better-reduce strain on knees  2) Lose weight/avoid other health complications  3) Be around for grandkids/kids  Plan/Goal for the Next 4 Weeks:   GOAL #1: Get into a regular gym routine at [facility] each week and try out some water aerobics classes  GOAL #1 Progress Toward Goal: 100% Completed!  (Has been going 2 times a week-wants to do 3 days now)  GOAL #2: Focus on meal planning ahead of time and try to make healthy choices with the correct portion size  GOAL #2 Progress Toward Goal: 50% (Still working on this-keeping healthy food on hand)  GOAL #3: Take time away from busy day-to-day routine and have some relaxation time for yourself  GOAL #3 Progress Toward Goal: 75% (Has been doing better on this/taking classes through work for improving self)  GOAL #4: Track daily step count using fit-bit and aim for at least 6,000 daily steps  GOAL #4 Progress Toward Goal: 100% Maintenance!  GOAL #5: Attend the [facility] 3 days a week doing a variety of exercise  GOAL #5 Progress Toward Goal: 0% New Goal  GOAL #6: Journal food, activity level, mood, and other day-to-day details each day  GOAL #6 Progress Toward Goal: 0% New Goal  GOAL #7: Schedule/attend a check-in  appointment with Janet NULL (Endocrinologist)  GOAL #7 Progress Toward Goal: 0% New Goal    Intervention:  Motivational Interviewing    MI Intervention: Expressed Empathy/Understanding, Supported Autonomy, Collaboration, Evocation, Permission to raise concern or advise, Open-ended questions and Change talk (evoked)     Change Talk Expressed by the Patient: Desire to change Ability to change Reasons to change Need to change Committment to change Activation Taking steps    Provider Response to Change Talk: E - Evoked more info from patient about behavior change, A - Affirmed patient's thoughts, decisions, or attempts at behavior change, R - Reflected patient's change talk and S - Summarized patient's change talk statements    Assessment / Progress on Treatment Objective(s) / Homework:    Achieved / completed to satisfaction - MAINTENANCE (Working to maintain change, with risk of relapse); Intervened by continuing to positively reinforce healthy behavior choice   Minimal progress - PREPARATION (Decided to change - considering how); Intervened by negotiating a change plan and determining options / strategies for behavior change, identifying triggers, exploring social supports, and working towards setting a date to begin behavior change  New Objective established this session - PREPARATION (Decided to change - considering how); Intervened by negotiating a change plan and determining options / strategies for behavior change, identifying triggers, exploring social supports, and working towards setting a date to begin behavior change  Satisfactory progress - ACTION (Actively working towards change); Intervened by reinforcing change plan / affirming steps taken         Plan: (Homework, other):  Patient was encouraged to continue to seek condition-related information and education, as well as schedule a follow up appointment with the Health  in 5 weeks. Patient has set self-identified goals and will monitor progress until  the next appointment.  Scheduled our next coaching session for Tuesday October 31st at 10am at the [facility].      Jacob Dominguez

## 2017-09-27 NOTE — MR AVS SNAPSHOT
After Visit Summary   9/27/2017    Keerthi Sloan    MRN: [id number]           Patient Information     Date Of Birth          1970        Visit Information        Provider Department      9/27/2017 9:00 AM HEALTH  - [address]        Today's Diagnoses     Morbid obesity (H)    -  1    Prediabetes           Follow-ups after your visit        Your next 10 appointments already scheduled     Oct 31, 2017 10:00 AM CDT   Nurse Only with [facility] - [address] ([facility])    [address] [facility] [address] [phone]              Who to contact     If you have questions or need follow up information about today's clinic visit or your schedule please contact [facility] directly at [phone].  Normal or non-critical lab and imaging results will be communicated to you by [employer], letter or phone within 4 business days after the clinic has received the results. If you do not hear from us within 7 days, please contact the clinic through [employer] or phone. If you have a critical or abnormal lab result, we will notify you by phone as soon as possible.  Submit refill requests through [employer] or call your pharmacy and they will forward the refill request to us. Please allow 3 business days for your refill to be completed.          Additional Information About Your Visit        MyChart Information     [employer] gives you secure access to your electronic health record. If you see a primary care provider, you can also send messages to your care team and make appointments. If you have questions, please call your primary care clinic.  If you do not have a primary care provider, please call [phone] and they will assist you.        Care EveryWhere ID     This is your Care EveryWhere ID. This could be used by other organizations to access your [facility]  records  [id number]         Blood Pressure from Last 3 Encounters:   02/15/17 102/66   01/23/17 126/78   10/03/16 105/74    Weight from Last 3 Encounters:   05/17/17 243 lb 6 oz (110.4 kg)   02/15/17 245 lb (111.1 kg)   01/23/17 259 lb 12.8 oz (117.8 kg)              Today, you had the following     No orders found for display       Primary Care Provider Office Phone # Fax #    Elida Cameron [phone] [address] [facility] [address]        Equal Access to Services     [facility]: Hadii edis Tenorio, waosmanda emy, qaybta kaalmada louis, holli wright . So [facility] [phone].    ATENCIÓN: Si habla español, tiene a riggs disposición servicios gratuitos de asistencia lingüística. [facility] [phone].    We comply with applicable federal civil rights laws and [place] laws. We do not discriminate on the basis of race, color, national origin, age, disability sex, sexual orientation or gender identity.            Thank you!     Thank you for choosing [facility]  for your care. Our goal is always to provide you with excellent care. Hearing back from our patients is one way we can continue to improve our services. Please take a few minutes to complete the written survey that you may receive in the mail after your visit with us. Thank you!             Your Updated Medication List - Protect others around you: Learn how to safely use, store and throw away your medicines at www.disposemymeds.org.          This list is accurate as of: 9/27/17 10:14 AM.  Always use your most recent med list.                   Brand Name Dispense Instructions for use Diagnosis    Ferrous Sulfate 27 MG Tabs      1 tab QD        metFORMIN 500 MG 24 hr tablet    GLUCOPHAGE-XR    180 tablet    Take 1 tablet (500 mg) by mouth 2 times daily (with meals)    Prediabetes, Vitamin D deficiency       naproxen 250 MG tablet    NAPROSYN     1 tab DAILY prn         omeprazole 40 MG capsule    priLOSEC          oxyCODONE 5 MG IR tablet    ROXICODONE     TK 1 TO 2 TS PO Q 3 H        phentermine 37.5 MG tablet    ADIPEX-P    30 tablet    Take 1 tablet (37.5 mg) by mouth every morning (before breakfast) Start with 1/2 tab q am x 1 week then increase to one tab q am as tolerated.    Morbid obesity due to excess calories (H)       ranitidine 150 MG tablet    ZANTAC    60 tablet    Take 1 tablet (150 mg) by mouth 2 times daily    Gastroesophageal reflux disease, esophagitis presence not specified       topiramate 50 MG tablet    TOPAMAX          vitamin D 95522 UNIT capsule    ERGOCALCIFEROL    12 capsule    TAKE 1 CAPSULE BY MOUTH EVERY 7 DAYS    Vitamin D deficiency

## 2017-11-01 ENCOUNTER — TELEPHONE (OUTPATIENT)
Dept: NURSING | Facility: CLINIC | Age: 47
End: 2017-11-01

## 2017-11-20 ENCOUNTER — TELEPHONE (OUTPATIENT)
Dept: NURSING | Facility: CLINIC | Age: 47
End: 2017-11-20

## 2017-11-28 ENCOUNTER — TELEPHONE (OUTPATIENT)
Dept: NURSING | Facility: CLINIC | Age: 47
End: 2017-11-28

## 2017-11-28 NOTE — TELEPHONE ENCOUNTER
Outreached patient for third and final time and was able to connect. Patient states she got a new phone number, but had forgot to update it on her health record-that is why I wasn't able to connect or leave a voicemail on my previous two tries.  Patient did want to reschedule our missed appointment and we were able to schedule for 12/7/2017.

## 2017-12-06 ENCOUNTER — OFFICE VISIT (OUTPATIENT)
Dept: ENDOCRINOLOGY | Facility: CLINIC | Age: 47
End: 2017-12-06
Payer: COMMERCIAL

## 2017-12-06 VITALS
HEART RATE: 60 BPM | DIASTOLIC BLOOD PRESSURE: 84 MMHG | SYSTOLIC BLOOD PRESSURE: 121 MMHG | TEMPERATURE: 97.7 F | BODY MASS INDEX: 43.96 KG/M2 | WEIGHT: 280.7 LBS | OXYGEN SATURATION: 100 % | RESPIRATION RATE: 16 BRPM

## 2017-12-06 DIAGNOSIS — E55.9 VITAMIN D DEFICIENCY: ICD-10-CM

## 2017-12-06 DIAGNOSIS — R73.03 PREDIABETES: ICD-10-CM

## 2017-12-06 DIAGNOSIS — E66.01 MORBID OBESITY DUE TO EXCESS CALORIES (H): ICD-10-CM

## 2017-12-06 LAB — HBA1C MFR BLD: 5.7 % (ref 4.3–6)

## 2017-12-06 PROCEDURE — 80053 COMPREHEN METABOLIC PANEL: CPT | Performed by: CLINICAL NURSE SPECIALIST

## 2017-12-06 PROCEDURE — 36415 COLL VENOUS BLD VENIPUNCTURE: CPT | Performed by: CLINICAL NURSE SPECIALIST

## 2017-12-06 PROCEDURE — 82306 VITAMIN D 25 HYDROXY: CPT | Performed by: CLINICAL NURSE SPECIALIST

## 2017-12-06 PROCEDURE — 83036 HEMOGLOBIN GLYCOSYLATED A1C: CPT | Performed by: CLINICAL NURSE SPECIALIST

## 2017-12-06 PROCEDURE — 99214 OFFICE O/P EST MOD 30 MIN: CPT | Performed by: CLINICAL NURSE SPECIALIST

## 2017-12-06 RX ORDER — PHENTERMINE HYDROCHLORIDE 37.5 MG/1
37.5 TABLET ORAL
Qty: 32 TABLET | Refills: 2 | Status: SHIPPED | OUTPATIENT
Start: 2017-12-06 | End: 2018-08-08

## 2017-12-06 RX ORDER — METFORMIN HCL 500 MG
500 TABLET, EXTENDED RELEASE 24 HR ORAL 2 TIMES DAILY WITH MEALS
Qty: 180 TABLET | Refills: 3 | Status: SHIPPED | OUTPATIENT
Start: 2017-12-06 | End: 2020-06-11

## 2017-12-06 RX ORDER — TOPIRAMATE 50 MG/1
50 TABLET, FILM COATED ORAL 2 TIMES DAILY
Qty: 180 TABLET | Refills: 1 | Status: SHIPPED | OUTPATIENT
Start: 2017-12-06 | End: 2018-08-08

## 2017-12-06 RX ORDER — ERGOCALCIFEROL 1.25 MG/1
CAPSULE, LIQUID FILLED ORAL
Qty: 12 CAPSULE | Refills: 1 | Status: SHIPPED | OUTPATIENT
Start: 2017-12-06 | End: 2018-08-08

## 2017-12-06 NOTE — MR AVS SNAPSHOT
After Visit Summary   12/6/2017    Keerthi Sloan    MRN: [id number]           Patient Information     Date Of Birth          1970        Visit Information        Provider Department      12/6/2017 8:30 AM Janet Castaneda APRN CNP [facility]        Today's Diagnoses     BMI 40.0-44.9, adult (H)    -  1    Vitamin D deficiency        Prediabetes        Morbid obesity due to excess calories (H)           Follow-ups after your visit        Follow-up notes from your care team     Return in about 3 months (around 3/6/2018).      Your next 10 appointments already scheduled     Dec 07, 2017  9:00 AM CST   Nurse Only with HEALTH  - REGION 5   [facility] ([facility])    [address] [place] [address] [phone]              Who to contact     If you have questions or need follow up information about today's clinic visit or your schedule please contact [facility] directly at [phone].  Normal or non-critical lab and imaging results will be communicated to you by A's Childhart, letter or phone within 4 business days after the clinic has received the results. If you do not hear from us within 7 days, please contact the clinic through Aereot or phone. If you have a critical or abnormal lab result, we will notify you by phone as soon as possible.  Submit refill requests through Eastbeam or call your pharmacy and they will forward the refill request to us. Please allow 3 business days for your refill to be completed.          Additional Information About Your Visit        A's Childhart Information     Eastbeam gives you secure access to your electronic health record. If you see a primary care provider, you can also send messages to your care team and make appointments. If you have questions, please call your primary care clinic.  If you do not have a primary care provider, please call [phone] and they  will assist you.        Care EveryWhere ID     This is your Care EveryWhere ID. This could be used by other organizations to access your [place] medical records  [id number]        Your Vitals Were     Pulse Temperature Respirations Last Period Pulse Oximetry Breastfeeding?    60 97.7  F (36.5  C) (Oral) 16 12/03/2017 (Exact Date) 100% No    BMI (Body Mass Index)                   43.96 kg/m2            Blood Pressure from Last 3 Encounters:   12/06/17 121/84   02/15/17 102/66   01/23/17 126/78    Weight from Last 3 Encounters:   12/06/17 127.3 kg (280 lb 11.2 oz)   05/17/17 110.4 kg (243 lb 6 oz)   02/15/17 111.1 kg (245 lb)              We Performed the Following     Comprehensive metabolic panel (BMP + Alb, Alk Phos, ALT, AST, Total. Bili, TP)     Hemoglobin A1c     Vitamin D Deficiency          Today's Medication Changes          These changes are accurate as of: 12/6/17  9:12 AM.  If you have any questions, ask your nurse or doctor.               These medicines have changed or have updated prescriptions.        Dose/Directions    phentermine 37.5 MG tablet   Commonly known as:  ADIPEX-P   This may have changed:  additional instructions   Used for:  Morbid obesity due to excess calories (H)   Changed by:  Janet Castaneda APRN CNP        Dose:  37.5 mg   Take 1 tablet (37.5 mg) by mouth every morning (before breakfast)   Quantity:  32 tablet   Refills:  2       topiramate 50 MG tablet   Commonly known as:  TOPAMAX   This may have changed:    - how much to take  - how to take this  - when to take this   Used for:  Vitamin D deficiency, Prediabetes, Morbid obesity due to excess calories (H)   Changed by:  Janet Castaneda APRN CNP        Dose:  50 mg   Take 1 tablet (50 mg) by mouth 2 times daily   Quantity:  180 tablet   Refills:  1       vitamin D 85755 UNIT capsule   Commonly known as:  ERGOCALCIFEROL   This may have changed:  See the new instructions.   Used for:  Vitamin D deficiency   Changed by:   Janet Castaneda, MADELEINE CNP        TAKE 1 CAPSULE BY MOUTH EVERY 7 DAYS   Quantity:  12 capsule   Refills:  1            Where to get your medicines      These medications were sent to [facility] [address]  [address]  [address]     Phone:  [phone]     metFORMIN 500 MG 24 hr tablet    topiramate 50 MG tablet    vitamin D 70977 UNIT capsule         Some of these will need a paper prescription and others can be bought over the counter.  Ask your nurse if you have questions.     Bring a paper prescription for each of these medications     phentermine 37.5 MG tablet                Primary Care Provider Office Phone # Fax #    Elida Cameron [phone] [address] [place] [address]        Equal Access to Services     MITCHELL LEMON : Krishna warren Soshreyas, waaxda luqadaha, qaybta kaalmada adeegyada, holli wright . So [facility] [phone].    ATENCIÓN: Si habla español, tiene a riggs disposición servicios gratuitos de asistencia lingüística. Llame al [phone].    We comply with applicable federal civil rights laws and [place] laws. We do not discriminate on the basis of race, color, national origin, age, disability, sex, sexual orientation, or gender identity.            Thank you!     Thank you for choosing [facility]  for your care. Our goal is always to provide you with excellent care. Hearing back from our patients is one way we can continue to improve our services. Please take a few minutes to complete the written survey that you may receive in the mail after your visit with us. Thank you!             Your Updated Medication List - Protect others around you: Learn how to safely use, store and throw away your medicines at www.disposemymeds.org.          This list is accurate as of: 12/6/17  9:12 AM.  Always use your most recent med list.                   Brand  Name Dispense Instructions for use Diagnosis    Ferrous Sulfate 27 MG Tabs      1 tab QD        metFORMIN 500 MG 24 hr tablet    GLUCOPHAGE-XR    180 tablet    Take 1 tablet (500 mg) by mouth 2 times daily (with meals)    Prediabetes, Vitamin D deficiency       naproxen 250 MG tablet    NAPROSYN     1 tab DAILY prn        omeprazole 40 MG capsule    priLOSEC          oxyCODONE IR 5 MG tablet    ROXICODONE     TK 1 TO 2 TS PO Q 3 H        phentermine 37.5 MG tablet    ADIPEX-P    32 tablet    Take 1 tablet (37.5 mg) by mouth every morning (before breakfast)    Morbid obesity due to excess calories (H)       ranitidine 150 MG tablet    ZANTAC    60 tablet    Take 1 tablet (150 mg) by mouth 2 times daily    Gastroesophageal reflux disease, esophagitis presence not specified       topiramate 50 MG tablet    TOPAMAX    180 tablet    Take 1 tablet (50 mg) by mouth 2 times daily    Vitamin D deficiency, Prediabetes, Morbid obesity due to excess calories (H)       vitamin D 90318 UNIT capsule    ERGOCALCIFEROL    12 capsule    TAKE 1 CAPSULE BY MOUTH EVERY 7 DAYS    Vitamin D deficiency

## 2017-12-06 NOTE — NURSING NOTE
"Chief Complaint   Patient presents with     Weight Problem       Initial /84 (BP Location: Left arm, Patient Position: Chair, Cuff Size: Adult Large)  Pulse 60  Temp 97.7  F (36.5  C) (Oral)  Resp 16  Wt 280 lb 11.2 oz (127.3 kg)  LMP 12/03/2017 (Exact Date)  SpO2 100%  Breastfeeding? No  BMI 43.96 kg/m2 Estimated body mass index is 43.96 kg/(m^2) as calculated from the following:    Height as of 2/15/17: 5' 7\" (1.702 m).    Weight as of this encounter: 280 lb 11.2 oz (127.3 kg).  Medication Reconciliation: complete  "

## 2017-12-06 NOTE — PROGRESS NOTES
Name: Keerthi Sloan  F/u for obesity (Last seen 1/23/2017).  HPI:  Keerthi Sloan is a 47 year old female who presents for the f/u of prediabetes, vitamin D deficiency, sleep apnea on CPAP, and obesity.  She started gaining weight in her 30's.  Weight stabilized in the low 300's the past 5 years and she was unable to lose any weight with diet efforts.  She considered gastric bypass and was being seen at [facility] in anticipation of doing the procedure but she didn't feel comfortable doing the bypass so quit going to the clinic.      She was briefly treated with Phentermine 5/2016.  She discontinued this on her own after 1-2 months.  She has been able to lose 51 lbs with diet efforts, 305-->288-->254 lbs, since 12/2015, todays weight increased to 280 lbs. Previously working with Jacob, the health .    + prediabetes - treated with Metformin 500 mg bid.  A1c improved 6.1--->5.5%.   Off Phentermine since knee surgery 10/2016. Briefly resumed 1/2017 - off since 4/2017.  Has continued Topamax 50 mg bid.  Would like to resume phentermine.    +iron deficiency - thought related to heavy menses, started oral iron 6/2016.  +vitamin D deficiency - D level 15, started vitamin D 50,000 IU weekly.  D improved to 21-22.  Has continued D 50K/week.  Sleep Apnea/Snores:Yes: diagnosed with sleep apnea, uses CPAP  Hyperlipidemia:Yes: elevated triglycerides  Hirsutism:Yes: increased facial hair growth (chin)  Fmily history of Obesity:Yes: Mother had gastric bypass  Diet: Struggled more with diet, mother moved in with her and wanted to do the cooking - she finally talked to her mom about the way she prefers to eat.  Previously: Eating whole, unprocessed foods, increased veggies, portion control.  Trying to eat more regularly throughout the day - usual eating pattern is to skip breakfast and lunch then overeat at dinner because she is so hungry by that point).  Has been trying to pack food for breakfast and lunch while at  work.   Exercise: yes, VIDAL   Previously working with Health :  First session 6/27/2016 - 1/3/2017:  Long Term Weight Loss Goal:        2.  Other: elevated 11pm salivary cortisol.  1 mg Dexamethasone suppression test was normal.      PMH/PSH:  History reviewed. No pertinent past medical history.  Past Surgical History:   Procedure Laterality Date     GYN SURGERY       HERNIA REPAIR       TUBAL LIGATION  1997    LBTL     Family Hx:  Family History   Problem Relation Age of Onset     DIABETES Father      Hypertension Father      KIDNEY DISEASE Father      DIABETES Brother      Hypertension Brother      Thyroid disease: No         DM2: Yes: father         Autoimmune: DM1, SLE, RA, Vitiligo No    Social Hx:  Social History     Social History     Marital status:      Spouse name: N/A     Number of children: 3     Years of education: N/A     Occupational History     [employer]     Social History Main Topics     Smoking status: Never Smoker     Smokeless tobacco: Never Used     Alcohol use No     Drug use: No     Sexual activity: Not Currently     Other Topics Concern     Not on file     Social History Narrative          MEDICATIONS:  has a current medication list which includes the following prescription(s): vitamin d, metformin, topiramate, phentermine, omeprazole, oxycodone ir, ranitidine, ferrous sulfate, and naproxen.    ROS     GENERAL: + weight gain, history of continued weight gain, no fevers, + fatigue, + night sweats and hot flashes.   HEENT: no dysphagia, odynophagia, diplopia, neck pain or tenderness  CV: no chest pain, pressure, palpitations, skipped beats, LOC  LUNGS: no SOB, STEELE, cough, sputum production, wheezing   ABDOMEN: no diarrhea, constipation, abdominal pain  EXTREMITIES: no rashes, ulcers, edema  NEUROLOGY: no changes in vision, tingling or numbness in hands or feet.   MSK: + muscle aches, + joint pain, no weakness  SKIN: no rashes or lesions  ENDOCRINE: no heat  or cold intolerance    Physical Exam   VS: /84 (BP Location: Left arm, Patient Position: Chair, Cuff Size: Adult Large)  Pulse 60  Temp 97.7  F (36.5  C) (Oral)  Resp 16  Wt 127.3 kg (280 lb 11.2 oz)  LMP 12/03/2017 (Exact Date)  SpO2 100%  Breastfeeding? No  BMI 43.96 kg/m2  GENERAL: NAD, well dressed, answering questions appropriately, appears stated age.  HEENT: OP clear, no exopthalmous, no proptosis, EOMI, no lig lag, no retraction, no scleral icterus  RESPIRATORY: Normal respiratory effort.  CARDIOVASCULAR: No peripheral edema.  EXTREMITIES: no edema, +pulses, no rashes, no lesions  NEUROLOGY: CN grossly intact, no tremors  MSK: grossly intact, No digital cyanosis. Normal gait and station.  SKIN: no rashes, no lesions, no ulcers  PSYCH: Intact judgment and insight. A&OX3 with a cordial affect.    LABS:  !COMPREHENSIVE Latest Ref Rng & Units 1/23/2017   SODIUM 133 - 144 mmol/L 141   POTASSIUM 3.4 - 5.3 mmol/L 4.2   CHLORIDE 94 - 109 mmol/L 106   BUN 7 - 30 mg/dL 12   Creatinine 0.52 - 1.04 mg/dL 0.66   Glucose 70 - 99 mg/dL 89   ANION GAP 3 - 14 mmol/L 9   CALCIUM 8.5 - 10.1 mg/dL 9.1   ALBUMIN 3.4 - 5.0 g/dL 3.8     Component      Latest Ref Rng & Units 5/26/2016 9/8/2016 1/23/2017   Hemoglobin A1C      4.3 - 6.0 % 6.1 (H) 5.5 5.7       Component    Latest Ref Rng 5/26/2016   C-Peptide    0.9 - 6.9 ng/mL 1.9   Insulin     9     Component    Latest Ref Rng 5/26/2016   WBC    4.0 - 11.0 10e9/L 6.0   RBC Count    3.8 - 5.2 10e12/L 5.08   Hemoglobin    11.7 - 15.7 g/dL 10.9 (L)   Hematocrit    35.0 - 47.0 % 35.7   MCV    78 - 100 fl 70 (L)   MCH    26.5 - 33.0 pg 21.5 (L)   MCHC    31.5 - 36.5 g/dL 30.5 (L)   RDW    10.0 - 15.0 % 17.7 (H)   Platelet Count    150 - 450 10e9/L 225     Component    Latest Ref Rng 5/26/2016   Iron    35 - 180 ug/dL 27 (L)   Iron Binding Cap    240 - 430 ug/dL 378   Iron Saturation Index    15 - 46 % 7 (L)   Ferritin    12 - 150 ng/mL 3 (L)     Component    Latest Ref Rng  "5/26/2016   Saliva Collection Time     2300   Cortisol Saliva     0.129     1 mg Dexamethasone suppression test:  Component    Latest Ref Rng 6/17/2016   Cortisol Serum    4 - 22 ug/dL 0.8 (L)     Component    Latest Ref Rng 5/26/2016   Testosterone Total    8 - 60 ng/dL 4 (L)   Sex Hormone Binding Globulin    30 - 135 nmol/L 34   Free Testosterone Calculated    0.11 - 0.58 ng/dL 0.07 (L)   Estradiol     26   FSH     5.8   Lutropin     2.7     Component      Latest Ref Rng & Units 9/8/2016 1/23/2017   Vitamin D Deficiency screening      20 - 75 ug/L 22 21     Vital Signs 7/13/2016 8/5/2016 9/7/2016 9/8/2016 10/3/2016   Weight (LB) 288 lb 277 lb 6 oz 283 lb 6.4 oz 282 lb 269 lb   Height   5' 7\"  5' 7.5\"   BMI   44.48  41.6     Vital Signs 1/3/2017 1/23/2017   Weight (LB) 254 lb 1 oz 259 lb 12.8 oz   Height  5' 7.5\"   BMI  40.17     Vital Signs 5/17/2017 12/6/2017   Weight (LB) 243 lb 6 oz 280 lb 11.2 oz   Height     BMI (Calculated)  43.96       All pertinent notes, labs, and images personally reviewed by me.     A/P  Ms.Kimberly CAROLYN Sloan is a 47 year old here for the evaluation of obesity:    1. IFG/prediabetes.  Fasting glucose elevated x 2, most recently in 2014.  + FH of type 2 diabetes.  No personal history of GD (3 pregnancies).  A1c elevated at 6.1%, improved to 5.5%.  Started Metformin  mg bid 6/2016, currently treated with Metformin 500 mg bid.   Continue current dose.  Plan to recheck A1c today and if increased > 5.7% will consider increasing Metformin to 2000 mg/day.    2. Obesity-  Current BMI 43-44.  Her comorbidities include IFG/prediabetes and MARÍA.  Obesity is associated with a significant increase in mortality and risk of many disorders, including diabetes mellitus, hypertension, dyslipidemia, heart disease, stroke, sleep apnea, cancer, and many others. Conversely, weight loss is associated with a reduction in obesity-associated morbidity.    Endocrine evaluation of obesity includes " Diabetes/prediabetes, Cushing's and thyroid dysfunction.  The relevant work up for the diagnosis and management of obesity and various treatment options were discussed. Body Mass Index (BMI) has been a standard means for calculating risk for overweight and obesity. The new American Association of Clinical Endocrinology (AACE) algorithm recommends lifestyle modifications as the initial phase of treatment for all patients with the BMI equal or greater than 25 kg/m2. Lifestyle modifications includes use of medical nutrition therapy, exercise, tobacco cessation, adequate quality and quantity of sleep, limited consumption of alcohol and reduced stress through implementation of a structured, multidisciplinary program.    In patients with complications associated with obesity, graded interventions are recommended including pharmacological therapy such as phentermine, orlistat, lorcaserin and phentermine/topiramate ER, contrave ( buproprion/naltreone) and the use of very low calorie meal replacement programs.    If medical intervention is insufficient, surgical therapy may be considered, especially in patients with BMI greater than or equal to 35 kg/m2 with multiple complications. Surgical treatments include lap-band, gastric sleeve or gastric bypass surgery.    She is considering surgical weight loss options.  Interested in transoral gastroplasty - procedure is done at [place].  Counseling exercise ( V65.41)  Dietary counseling( V65.3)  Calculated BMI above the upper parameter and f/u plan was documented in the medical record.  Discussed indications, risks and benefits of all medications prescribed, and answered questions to patient's satisfaction.  Restart phentermine 37.5 mg qd.  Continue Topamax 50 mg bid.    3. Vitamin D deficiency.  Vitamin D level low at 15, improved to 21.  Started Vitamin D 50,000 IU weekly 6/2016.  Plan to recheck D level today.    Labs ordered today:   Orders Placed This Encounter   Procedures      Comprehensive metabolic panel (BMP + Alb, Alk Phos, ALT, AST, Total. Bili, TP)     Hemoglobin A1c     Vitamin D Deficiency     Radiology/Consults ordered today: None    More than 50% of the time spent with Ms. Sloan on counseling / coordinating her care.  Total face to face time was greater than or equal to 15 minutes.      Follow-up:  6 months    Janet Castaneda NP  Endocrinology  [facility]  CC:   ____________________________________________________________

## 2017-12-07 ENCOUNTER — TELEPHONE (OUTPATIENT)
Dept: NURSING | Facility: CLINIC | Age: 47
End: 2017-12-07

## 2017-12-07 LAB
ALBUMIN SERPL-MCNC: 3.6 G/DL (ref 3.4–5)
ALP SERPL-CCNC: 52 U/L (ref 40–150)
ALT SERPL W P-5'-P-CCNC: 18 U/L (ref 0–50)
ANION GAP SERPL CALCULATED.3IONS-SCNC: 10 MMOL/L (ref 3–14)
AST SERPL W P-5'-P-CCNC: 15 U/L (ref 0–45)
BILIRUB SERPL-MCNC: 0.3 MG/DL (ref 0.2–1.3)
BUN SERPL-MCNC: 15 MG/DL (ref 7–30)
CALCIUM SERPL-MCNC: 8.6 MG/DL (ref 8.5–10.1)
CHLORIDE SERPL-SCNC: 106 MMOL/L (ref 94–109)
CO2 SERPL-SCNC: 26 MMOL/L (ref 20–32)
CREAT SERPL-MCNC: 0.7 MG/DL (ref 0.52–1.04)
DEPRECATED CALCIDIOL+CALCIFEROL SERPL-MC: 14 UG/L (ref 20–75)
GFR SERPL CREATININE-BSD FRML MDRD: 89 ML/MIN/1.7M2
GLUCOSE SERPL-MCNC: 90 MG/DL (ref 70–99)
POTASSIUM SERPL-SCNC: 3.9 MMOL/L (ref 3.4–5.3)
PROT SERPL-MCNC: 7.4 G/DL (ref 6.8–8.8)
SODIUM SERPL-SCNC: 142 MMOL/L (ref 133–144)

## 2017-12-13 NOTE — PROGRESS NOTES
Veronica,  Your D level is still low.  Continue D 50,000 IU weekly.  Your A1c has increased slightly compared to your last level, was 5.5% now 5.7%, we'll continue to monitor it  All your other labs are normal.  Janet Castaneda NP  Endocrinology

## 2017-12-27 ENCOUNTER — TELEPHONE (OUTPATIENT)
Dept: NURSING | Facility: CLINIC | Age: 47
End: 2017-12-27

## 2018-01-05 ENCOUNTER — ALLIED HEALTH/NURSE VISIT (OUTPATIENT)
Dept: NURSING | Facility: CLINIC | Age: 48
End: 2018-01-05

## 2018-01-05 VITALS — WEIGHT: 281 LBS | BODY MASS INDEX: 44.01 KG/M2

## 2018-01-05 DIAGNOSIS — R73.03 PREDIABETES: ICD-10-CM

## 2018-01-05 PROCEDURE — 99207 ZZC HEALTH COACHING, NO CHARGE: CPT

## 2018-01-05 NOTE — MR AVS SNAPSHOT
After Visit Summary   1/5/2018    Keerthi Sloan    MRN: [id number]           Patient Information     Date Of Birth          1970        Visit Information        Provider Department      1/5/2018 9:30 AM HEALTH  - REGION 5 [facility]        Care Instructions      January 5, 2018    [facility]  [address] [facility] [address]  [phone]  Health Coaching Progress Note    Patient Name: Keerthi Sloan Date: January 5, 2018      GOALS: Patient will work on the following goals until our next meeting:  GOAL #1: Focus on meal planning ahead of time and try to make healthy choices with the correct portion size  GOAL #2: Take time away from busy day-to-day routine and have some relaxation time for yourself  GOAL #3: Track daily step count using fit-bit and aim for at least 6,000 daily steps  GOAL #4: Attend the [employer] 3 days a week doing a variety of exercise  GOAL #5: Journal food, activity level, mood, and other day-to-day details each day        Plan: (Homework, other):  Patient was encouraged to continue to seek condition-related information and education, as well as schedule a follow up appointment with the Health  in 5 weeks. Patient has set self-identified goals and will monitor progress until the next appointment.  Scheduled our next coaching session for Monday February 5th at 9am.  Jacob Dominguez       Resources:                Follow-ups after your visit        Who to contact     If you have questions or need follow up information about today's clinic visit or your schedule please contact [facility] directly at [phone].  Normal or non-critical lab and imaging results will be communicated to you by MyChart, letter or phone within 4 business days after the clinic has received the results. If you do not hear from us within 7 days, please contact the  clinic through JK-Group or phone. If you have a critical or abnormal lab result, we will notify you by phone as soon as possible.  Submit refill requests through JK-Group or call your pharmacy and they will forward the refill request to us. Please allow 3 business days for your refill to be completed.          Additional Information About Your Visit        NHK Worldhart Information     [employer]Group gives you secure access to your electronic health record. If you see a primary care provider, you can also send messages to your care team and make appointments. If you have questions, please call your primary care clinic.  If you do not have a primary care provider, please call [phone] and they will assist you.        Care EveryWhere ID     This is your Care EveryWhere ID. This could be used by other organizations to access your [place] medical records  [id number]        Your Vitals Were     Last Period                   12/03/2017 (Exact Date)            Blood Pressure from Last 3 Encounters:   12/06/17 121/84   02/15/17 102/66   01/23/17 126/78    Weight from Last 3 Encounters:   12/06/17 280 lb 11.2 oz (127.3 kg)   05/17/17 243 lb 6 oz (110.4 kg)   02/15/17 245 lb (111.1 kg)              Today, you had the following     No orders found for display       Primary Care Provider Office Phone # Fax #    Elida Cameron [phone] [address] [facility] [address]        Equal Access to Services     NICOLETTE LEMON : Hadii aad ku hadasho Soomaali, waaxda luqadaha, qaybta kaalmada adeegyada, holli bettencourt haydavid wright . So [facility] [phone].    ATENCIÓN: Si habla español, tiene a riggs disposición servicios gratuitos de asistencia lingüística. Llame al [phone].    We comply with applicable federal civil rights laws and [place] laws. We do not discriminate on the basis of race, color, national origin, age, disability, sex, sexual orientation, or gender identity.            Thank you!      Thank you for choosing [facility]  for your care. Our goal is always to provide you with excellent care. Hearing back from our patients is one way we can continue to improve our services. Please take a few minutes to complete the written survey that you may receive in the mail after your visit with us. Thank you!             Your Updated Medication List - Protect others around you: Learn how to safely use, store and throw away your medicines at www.disposemymeds.org.          This list is accurate as of: 1/5/18  9:55 AM.  Always use your most recent med list.                   Brand Name Dispense Instructions for use Diagnosis    Ferrous Sulfate 27 MG Tabs      1 tab QD        metFORMIN 500 MG 24 hr tablet    GLUCOPHAGE-XR    180 tablet    Take 1 tablet (500 mg) by mouth 2 times daily (with meals)    Prediabetes, Vitamin D deficiency       naproxen 250 MG tablet    NAPROSYN     1 tab DAILY prn        omeprazole 40 MG capsule    priLOSEC          oxyCODONE IR 5 MG tablet    ROXICODONE     TK 1 TO 2 TS PO Q 3 H        phentermine 37.5 MG tablet    ADIPEX-P    32 tablet    Take 1 tablet (37.5 mg) by mouth every morning (before breakfast)    Morbid obesity due to excess calories (H)       ranitidine 150 MG tablet    ZANTAC    60 tablet    Take 1 tablet (150 mg) by mouth 2 times daily    Gastroesophageal reflux disease, esophagitis presence not specified       topiramate 50 MG tablet    TOPAMAX    180 tablet    Take 1 tablet (50 mg) by mouth 2 times daily    Vitamin D deficiency, Prediabetes, Morbid obesity due to excess calories (H)       vitamin D 54489 UNIT capsule    ERGOCALCIFEROL    12 capsule    TAKE 1 CAPSULE BY MOUTH EVERY 7 DAYS    Vitamin D deficiency

## 2018-01-05 NOTE — PATIENT INSTRUCTIONS
January 5, 2018    [address] [address]  [phone]  Health Coaching Progress Note    Patient Name: Keerthi Sloan Date: January 5, 2018      GOALS: Patient will work on the following goals until our next meeting:  GOAL #1: Focus on meal planning ahead of time and try to make healthy choices with the correct portion size  GOAL #2: Take time away from busy day-to-day routine and have some relaxation time for yourself  GOAL #3: Track daily step count using fit-bit and aim for at least 6,000 daily steps  GOAL #4: Attend the [employer] 3 days a week doing a variety of exercise  GOAL #5: Journal food, activity level, mood, and other day-to-day details each day        Plan: (Homework, other):  Patient was encouraged to continue to seek condition-related information and education, as well as schedule a follow up appointment with the Health  in 5 weeks. Patient has set self-identified goals and will monitor progress until the next appointment.  Scheduled our next coaching session for Monday February 5th at 9am.  Jacob Dominguez       Resources:
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
## 2018-01-12 ENCOUNTER — OFFICE VISIT (OUTPATIENT)
Dept: FAMILY MEDICINE | Facility: CLINIC | Age: 48
End: 2018-01-12
Payer: COMMERCIAL

## 2018-01-12 VITALS
HEIGHT: 67 IN | HEART RATE: 80 BPM | BODY MASS INDEX: 43.82 KG/M2 | OXYGEN SATURATION: 96 % | WEIGHT: 279.2 LBS | DIASTOLIC BLOOD PRESSURE: 80 MMHG | TEMPERATURE: 98.1 F | RESPIRATION RATE: 20 BRPM | SYSTOLIC BLOOD PRESSURE: 118 MMHG

## 2018-01-12 DIAGNOSIS — J06.9 UPPER RESPIRATORY TRACT INFECTION, UNSPECIFIED TYPE: Primary | ICD-10-CM

## 2018-01-12 PROCEDURE — 99213 OFFICE O/P EST LOW 20 MIN: CPT | Performed by: NURSE PRACTITIONER

## 2018-01-12 RX ORDER — FLUTICASONE PROPIONATE 50 MCG
2 SPRAY, SUSPENSION (ML) NASAL DAILY
Qty: 16 G | Refills: 3 | Status: SHIPPED | OUTPATIENT
Start: 2018-01-12 | End: 2018-02-02

## 2018-01-12 NOTE — MR AVS SNAPSHOT
After Visit Summary   1/12/2018    Keerthi Sloan    MRN: [id number]           Patient Information     Date Of Birth          1970        Visit Information        Provider Department      1/12/2018 10:20 AM Ailyn Mcdonnell Ra, APRN CNP [facility]        Today's Diagnoses     Upper respiratory tract infection, unspecified type    -  1      Care Instructions    Please continue to monitor symptoms.  I would recommend increasing fluids, rest, and steamy humidification.  Please return to clinic if you notice any change or increase in symptoms.      Use the neti pot, this is sold at most pharmacies.    Use the nasal spray as well.    Call if you have persistent or worsening symptoms.          Follow-ups after your visit        Your next 10 appointments already scheduled     Feb 05, 2018  9:00 AM CST   Nurse Only with HEALTH  - REGION 5   [facility] ([address] [address] [phone]              Who to contact     If you have questions or need follow up information about today's clinic visit or your schedule please contact [facility] directly at [phone].  Normal or non-critical lab and imaging results will be communicated to you by MyChart, letter or phone within 4 business days after the clinic has received the results. If you do not hear from us within 7 days, please contact the clinic through [facility] or phone. If you have a critical or abnormal lab result, we will notify you by phone as soon as possible.  Submit refill requests through [employer] or call your pharmacy and they will forward the refill request to us. Please allow 3 business days for your refill to be completed.          Additional Information About Your Visit        MyChart Information     [employer] gives you secure access to your electronic health record. If you see a primary care provider, you can also  "send messages to your care team and make appointments. If you have questions, please call your primary care clinic.  If you do not have a primary care provider, please call [phone] and they will assist you.        Care EveryWhere ID     This is your Care EveryWhere ID. This could be used by other organizations to access your [facility] medical records  [id number]        Your Vitals Were     Pulse Temperature Respirations Height Pulse Oximetry BMI (Body Mass Index)    80 98.1  F (36.7  C) (Oral) 20 5' 7\" (1.702 m) 96% 43.73 kg/m2       Blood Pressure from Last 3 Encounters:   01/12/18 118/80   12/06/17 121/84   02/15/17 102/66    Weight from Last 3 Encounters:   01/12/18 279 lb 3.2 oz (126.6 kg)   01/05/18 281 lb (127.5 kg)   12/06/17 280 lb 11.2 oz (127.3 kg)              Today, you had the following     No orders found for display         Today's Medication Changes          These changes are accurate as of: 1/12/18 11:01 AM.  If you have any questions, ask your nurse or doctor.               Start taking these medicines.        Dose/Directions    fluticasone 50 MCG/ACT spray   Commonly known as:  FLONASE   Used for:  Upper respiratory tract infection, unspecified type   Started by:  Ailyn Mcdonnell Ra, APRN CNP        Dose:  2 spray   Spray 2 sprays into both nostrils daily   Quantity:  16 g   Refills:  3            Where to get your medicines      These medications were sent to [facility]  [place] - [address]  [address] [place] [address]     Phone:  [phone]     fluticasone 50 MCG/ACT spray                Primary Care Provider Office Phone # Fax #    Elida Cameron [phone] [address]  [facility] [address]        Equal Access to Services     [facility] : Krishna Tenorio, waosmanda luqadaha, qaybta kaalholli king . [facility] [phone].    ATENCIÓN: Si daren jackson, " tiene a riggs disposición servicios gratuitos de asistencia lingüística. Frankie odell [phone].    We comply with applicable federal civil rights laws and [place] laws. We do not discriminate on the basis of race, color, national origin, age, disability, sex, sexual orientation, or gender identity.            Thank you!     Thank you for choosing [facility]  for your care. Our goal is always to provide you with excellent care. Hearing back from our patients is one way we can continue to improve our services. Please take a few minutes to complete the written survey that you may receive in the mail after your visit with us. Thank you!             Your Updated Medication List - Protect others around you: Learn how to safely use, store and throw away your medicines at www.disposemymeds.org.          This list is accurate as of: 1/12/18 11:01 AM.  Always use your most recent med list.                   Brand Name Dispense Instructions for use Diagnosis    Ferrous Sulfate 27 MG Tabs      1 tab QD        fluticasone 50 MCG/ACT spray    FLONASE    16 g    Spray 2 sprays into both nostrils daily    Upper respiratory tract infection, unspecified type       metFORMIN 500 MG 24 hr tablet    GLUCOPHAGE-XR    180 tablet    Take 1 tablet (500 mg) by mouth 2 times daily (with meals)    Prediabetes, Vitamin D deficiency       omeprazole 40 MG capsule    priLOSEC          phentermine 37.5 MG tablet    ADIPEX-P    32 tablet    Take 1 tablet (37.5 mg) by mouth every morning (before breakfast)    Morbid obesity due to excess calories (H)       ranitidine 150 MG tablet    ZANTAC    60 tablet    Take 1 tablet (150 mg) by mouth 2 times daily    Gastroesophageal reflux disease, esophagitis presence not specified       topiramate 50 MG tablet    TOPAMAX    180 tablet    Take 1 tablet (50 mg) by mouth 2 times daily    Vitamin D deficiency, Prediabetes, Morbid obesity due to excess calories (H)       vitamin D 57973 UNIT capsule     ERGOCALCIFEROL    12 capsule    TAKE 1 CAPSULE BY MOUTH EVERY 7 DAYS    Vitamin D deficiency

## 2018-01-12 NOTE — PATIENT INSTRUCTIONS
Please continue to monitor symptoms.  I would recommend increasing fluids, rest, and steamy humidification.  Please return to clinic if you notice any change or increase in symptoms.      Use the neti pot, this is sold at most pharmacies.    Use the nasal spray as well.    Call if you have persistent or worsening symptoms.

## 2018-01-12 NOTE — NURSING NOTE
"Chief Complaint   Patient presents with     URI       Initial /80  Pulse 80  Temp 98.1  F (36.7  C) (Oral)  Resp 20  Ht 5' 7\" (1.702 m)  Wt 279 lb 3.2 oz (126.6 kg)  SpO2 96%  BMI 43.73 kg/m2 Estimated body mass index is 43.73 kg/(m^2) as calculated from the following:    Height as of this encounter: 5' 7\" (1.702 m).    Weight as of this encounter: 279 lb 3.2 oz (126.6 kg).  Medication Reconciliation: complete   Caitie VÁZQUEZ M.A.      "

## 2018-01-12 NOTE — PROGRESS NOTES
"  SUBJECTIVE:   Keerthi Sloan is a 47 year old female who presents to clinic today for the following health issues:      RESPIRATORY SYMPTOMS      Duration: 5 days    Description  nasal congestion, rhinorrhea, cough, chills and chest congestion, eyes mattery and crusty    Severity: moderate    Accompanying signs and symptoms: None    History (predisposing factors):  none    Precipitating or alleviating factors: None    Therapies tried and outcome:  rest and fluids Emergen-C without relief     Pt presents with URI symptoms.  Congestion, rhinorrhea.  Draining into throat.  Did have one day of stomach upset and diarrhea but this resolved.  Taking in food and fluids.  Coughing, productive cough.  No fever.  No sore throat.    Problem list and histories reviewed & adjusted, as indicated.  Additional history: as documented    Patient Active Problem List   Diagnosis     Grief     Binge eating     Dysmenorrhea     Morbid obesity (H)     History of biliary T-tube placement     Prediabetes     Vitamin D deficiency     Anemia, iron deficiency     Obstructive sleep apnea syndrome     Stress incontinence in female     Uterine leiomyoma     BMI 40.0-44.9, adult (H)     Past Surgical History:   Procedure Laterality Date     GYN SURGERY       HERNIA REPAIR       TUBAL LIGATION  1997    [facility]       Social History   Substance Use Topics     Smoking status: Never Smoker     Smokeless tobacco: Never Used     Alcohol use No     Family History   Problem Relation Age of Onset     DIABETES Father      Hypertension Father      KIDNEY DISEASE Father      DIABETES Brother      Hypertension Brother              Reviewed and updated as needed this visit by clinical staffTobacco  Allergies  Meds  Med Hx  Surg Hx  Fam Hx  Soc Hx      Reviewed and updated as needed this visit by Provider         ROS:  SEE HPI.    OBJECTIVE:     /80  Pulse 80  Temp 98.1  F (36.7  C) (Oral)  Resp 20  Ht 5' 7\" (1.702 m)  Wt 279 lb 3.2 oz (126.6 " kg)  SpO2 96%  BMI 43.73 kg/m2  Body mass index is 43.73 kg/(m^2).  GENERAL: healthy, alert and no distress  EYES: Eyes grossly normal to inspection  HENT: normal cephalic/atraumatic, ear canals normal, R TM mild effusion, no injection.  nose and mouth without ulcers or lesions, oropharynx clear, oral mucous membranes moist and tonsils 3+, symmetric, injected.  Uvula midline.  NECK: no adenopathy, no asymmetry, masses, or scars and thyroid normal to palpation  RESP: lungs clear to auscultation - no rales, rhonchi or wheezes  CV: regular rates and rhythm and normal S1 S2, no S3 or S4  PSYCH: mentation appears normal, affect normal/bright    Diagnostic Test Results:  No results found for this or any previous visit (from the past 24 hour(s)).    ASSESSMENT/PLAN:   1. Upper respiratory tract infection, unspecified type  47 y.o. Female, here today with URI symptoms.  Tolerating well.  Reviewed symptomatic care.  Call if R ear pain develops (slight effusion today).  Flonase, netipot.  Monitor.  Return to clinic if symptoms persist or worsen.    Pt agrees with plan and verbalized understanding.  - fluticasone (FLONASE) 50 MCG/ACT spray; Spray 2 sprays into both nostrils daily  Dispense: 16 g; Refill: 3    MADELEINE Reddy Ra [facility]

## 2018-02-02 ENCOUNTER — OFFICE VISIT (OUTPATIENT)
Dept: FAMILY MEDICINE | Facility: CLINIC | Age: 48
End: 2018-02-02
Payer: COMMERCIAL

## 2018-02-02 VITALS
BODY MASS INDEX: 43.16 KG/M2 | OXYGEN SATURATION: 97 % | SYSTOLIC BLOOD PRESSURE: 110 MMHG | DIASTOLIC BLOOD PRESSURE: 78 MMHG | RESPIRATION RATE: 16 BRPM | HEIGHT: 67 IN | TEMPERATURE: 97.9 F | HEART RATE: 70 BPM | WEIGHT: 275 LBS

## 2018-02-02 DIAGNOSIS — N39.44 NOCTURNAL ENURESIS: ICD-10-CM

## 2018-02-02 DIAGNOSIS — F51.02 ADJUSTMENT INSOMNIA: ICD-10-CM

## 2018-02-02 DIAGNOSIS — F43.23 ADJUSTMENT DISORDER WITH MIXED ANXIETY AND DEPRESSED MOOD: ICD-10-CM

## 2018-02-02 DIAGNOSIS — F43.10 PTSD (POST-TRAUMATIC STRESS DISORDER): Primary | ICD-10-CM

## 2018-02-02 PROCEDURE — 99214 OFFICE O/P EST MOD 30 MIN: CPT | Performed by: FAMILY MEDICINE

## 2018-02-02 RX ORDER — TRAZODONE HYDROCHLORIDE 50 MG/1
50-100 TABLET, FILM COATED ORAL
Qty: 30 TABLET | Refills: 1 | Status: SHIPPED | OUTPATIENT
Start: 2018-02-02 | End: 2018-02-15

## 2018-02-02 ASSESSMENT — ANXIETY QUESTIONNAIRES
1. FEELING NERVOUS, ANXIOUS, OR ON EDGE: NEARLY EVERY DAY
2. NOT BEING ABLE TO STOP OR CONTROL WORRYING: NEARLY EVERY DAY
6. BECOMING EASILY ANNOYED OR IRRITABLE: NEARLY EVERY DAY
5. BEING SO RESTLESS THAT IT IS HARD TO SIT STILL: NEARLY EVERY DAY
GAD7 TOTAL SCORE: 21
IF YOU CHECKED OFF ANY PROBLEMS ON THIS QUESTIONNAIRE, HOW DIFFICULT HAVE THESE PROBLEMS MADE IT FOR YOU TO DO YOUR WORK, TAKE CARE OF THINGS AT HOME, OR GET ALONG WITH OTHER PEOPLE: VERY DIFFICULT
3. WORRYING TOO MUCH ABOUT DIFFERENT THINGS: NEARLY EVERY DAY
7. FEELING AFRAID AS IF SOMETHING AWFUL MIGHT HAPPEN: NEARLY EVERY DAY

## 2018-02-02 ASSESSMENT — PATIENT HEALTH QUESTIONNAIRE - PHQ9: 5. POOR APPETITE OR OVEREATING: NEARLY EVERY DAY
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
## 2018-02-02 NOTE — MR AVS SNAPSHOT
After Visit Summary   2/2/2018    Keerthi Sloan    MRN: [id number]           Patient Information     Date Of Birth          1970        Visit Information        Provider Department      2/2/2018 7:40 AM Elida Cameron,  [facility]        Today's Diagnoses     PTSD (post-traumatic stress disorder)    -  1    Adjustment disorder with mixed anxiety and depressed mood        Adjustment insomnia        Nocturnal enuresis           Follow-ups after your visit        Your next 10 appointments already scheduled     Feb 05, 2018  9:00 AM CST   Telephone Visit with HEALTH  - [facility] [address] [place] [address] ([facility])    [address] [place] [id number] [phone]           Note: this is not an onsite visit; there is no need to come to the facility.              Who to contact     If you have questions or need follow up information about today's clinic visit or your schedule please contact [facility] directly at [phone].  Normal or non-critical lab and imaging results will be communicated to you by CircuLite[employer], letter or phone within 4 business days after the clinic has received the results. If you do not hear from us within 7 days, please contact the clinic through [employer] or phone. If you have a critical or abnormal lab result, we will notify you by phone as soon as possible.  Submit refill requests through [employer] or call your pharmacy and they will forward the refill request to us. Please allow 3 business days for your refill to be completed.          Additional Information About Your Visit        MyChart Information     [employer] gives you secure access to your electronic health record. If you see a primary care provider, you can also send messages to your care team and make appointments. If you have questions, please call your primary care clinic.  If you do not have a primary care provider,  "please call [phone] and they will assist you.        Care EveryWhere ID     This is your Care EveryWhere ID. This could be used by other organizations to access your [facility] medical records  [id number]        Your Vitals Were     Pulse Temperature Respirations Height Pulse Oximetry BMI (Body Mass Index)    70 97.9  F (36.6  C) (Oral) 16 5' 7\" (1.702 m) 97% 43.07 kg/m2       Blood Pressure from Last 3 Encounters:   02/02/18 110/78   01/12/18 118/80   12/06/17 121/84    Weight from Last 3 Encounters:   02/02/18 275 lb (124.7 kg)   01/12/18 279 lb 3.2 oz (126.6 kg)   01/05/18 281 lb (127.5 kg)              Today, you had the following     No orders found for display         Today's Medication Changes          These changes are accurate as of 2/2/18 10:19 AM.  If you have any questions, ask your nurse or doctor.               Start taking these medicines.        Dose/Directions    sertraline 50 MG tablet   Commonly known as:  ZOLOFT   Used for:  PTSD (post-traumatic stress disorder), Adjustment disorder with mixed anxiety and depressed mood   Started by:  Elida Cameron DO        Take 1/2 tablet (25 mg) for 1-2 weeks, then increase to 1 tablet orally daily   Quantity:  30 tablet   Refills:  1       traZODone 50 MG tablet   Commonly known as:  DESYREL   Used for:  PTSD (post-traumatic stress disorder), Adjustment disorder with mixed anxiety and depressed mood, Adjustment insomnia   Started by:  Elida Cameron DO        Dose:   mg   Take 1-2 tablets ( mg) by mouth nightly as needed for sleep   Quantity:  30 tablet   Refills:  1         Stop taking these medicines if you haven't already. Please contact your care team if you have questions.     fluticasone 50 MCG/ACT spray   Commonly known as:  FLONASE   Stopped by:  Elida Cameron DO                Where to get your medicines      These medications were sent to [facility], [place] - [address] [facility]  [address] [address] [facility] [address]     Phone:  [phone]     sertraline 50 MG tablet    traZODone 50 MG tablet                Primary Care Provider Office Phone # Fax #    Elida Cameron [phone] [address] [facility] [address]        Equal Access to Services     MITCHELL LEMON : Hadii aad ku hadasho Soomaali, waaxda luqadaha, qaybta kaalmada adeegyada, waxay idiin hayaan ademichael kharash kyle . So [facility] [phone].    ATENCIÓN: Si habla español, tiene a riggs disposición servicios gratuitos de asistencia lingüística. [facility] [phone].    We comply with applicable federal civil rights laws and [place] laws. We do not discriminate on the basis of race, color, national origin, age, disability, sex, sexual orientation, or gender identity.            Thank you!     Thank you for choosing [facility]  for your care. Our goal is always to provide you with excellent care. Hearing back from our patients is one way we can continue to improve our services. Please take a few minutes to complete the written survey that you may receive in the mail after your visit with us. Thank you!             Your Updated Medication List - Protect others around you: Learn how to safely use, store and throw away your medicines at www.disposemymeds.org.          This list is accurate as of 2/2/18 10:19 AM.  Always use your most recent med list.                   Brand Name Dispense Instructions for use Diagnosis    Ferrous Sulfate 27 MG Tabs      1 tab QD        metFORMIN 500 MG 24 hr tablet    GLUCOPHAGE-XR    180 tablet    Take 1 tablet (500 mg) by mouth 2 times daily (with meals)    Prediabetes, Vitamin D deficiency       omeprazole 40 MG capsule    priLOSEC          phentermine 37.5 MG tablet    ADIPEX-P    32 tablet    Take 1 tablet (37.5 mg) by mouth every morning (before breakfast)    Morbid obesity due to excess calories (H)       ranitidine 150 MG tablet    ZANTAC    60 tablet     Take 1 tablet (150 mg) by mouth 2 times daily    Gastroesophageal reflux disease, esophagitis presence not specified       sertraline 50 MG tablet    ZOLOFT    30 tablet    Take 1/2 tablet (25 mg) for 1-2 weeks, then increase to 1 tablet orally daily    PTSD (post-traumatic stress disorder), Adjustment disorder with mixed anxiety and depressed mood       topiramate 50 MG tablet    TOPAMAX    180 tablet    Take 1 tablet (50 mg) by mouth 2 times daily    Vitamin D deficiency, Prediabetes, Morbid obesity due to excess calories (H)       traZODone 50 MG tablet    DESYREL    30 tablet    Take 1-2 tablets ( mg) by mouth nightly as needed for sleep    PTSD (post-traumatic stress disorder), Adjustment disorder with mixed anxiety and depressed mood, Adjustment insomnia       vitamin D 23544 UNIT capsule    ERGOCALCIFEROL    12 capsule    TAKE 1 CAPSULE BY MOUTH EVERY 7 DAYS    Vitamin D deficiency

## 2018-02-03 ASSESSMENT — PATIENT HEALTH QUESTIONNAIRE - PHQ9: SUM OF ALL RESPONSES TO PHQ QUESTIONS 1-9: 20

## 2018-02-03 ASSESSMENT — ANXIETY QUESTIONNAIRES: GAD7 TOTAL SCORE: 21

## 2018-02-05 ENCOUNTER — VIRTUAL VISIT (OUTPATIENT)
Dept: NURSING | Facility: CLINIC | Age: 48
End: 2018-02-05

## 2018-02-05 DIAGNOSIS — R73.03 PREDIABETES: ICD-10-CM

## 2018-02-05 PROCEDURE — 99207 ZZC HEALTH COACHING, NO CHARGE: CPT

## 2018-02-05 NOTE — PROGRESS NOTES
February 5, 2018    [facility]  [id number] [place] [id number] [phone]  Health Coaching Progress Note    Patient Name: Keerthi Sloan Date: February 5, 2018      Session Length: 30      DATA    PRM Master Survey Scores Reviewed: Yes    Core Healthy Days Survey:         YAQUELIN Score (Last Two) 5/17/2017 7/24/2017   YAQUELIN Raw Score 33 32   Activation Score 65.8 62.6   YAQUELIN Level 3 3       PHQ-2 Score 2/2/2018 7/24/2017   PHQ-2 Total Score Interpretation - Positive if 3 or more points; Administer PHQ-9 if positive 2 0   MyC PHQ-2 Score - -       PHQ-9 SCORE 2/2/2018   Total Score 20       Treatment Objective(s) Addressed in This Session:  Target Behavior(s): diet/weight loss and disease management/lifestyle changes of making sure to have healthy foods on hand and pre-planning meals ahead of time, prepping foods ahead of time/healthy recipes, making healthier dietary choices/not skipping meals/portion size, using a journal to stay honest on diet/exercise, getting into an exercise routine-going to [employer] 3 times per week (On Hold)-might look at cheaper gym options or exercise at home while not working, making time for self/relaxation, tracking steps with fit-bit-->shooting for 6,000 daily, continuing to get used to changing work schedule/finding time to be active, weight loss and maintenance, an accessing resources!      Current Stressors / Issues:  Had a very traumatic even happen at work-still trying to get past that-reset/taking time away from work-financial changes, might have to suspend [employer] membership for time being, starting a journal-recording diet/exercise/mood, frustrated she gained a lot of weight lost back again,  mom has been cooking good comfort food-not so healthy, job has changing hours each month/works nights, getting used to new routine, staying on track with her diet, getting into gym routine-3x a week, prepping  for cooking/using a plastic pre-portion meal planning container, being more active as able, staying motivated, continued weight loss and maintenance!      What Patient Does Well:   1) Patient is working hard to get back on track with a regular exercise routine and better eating habits-continues to learn what works and what doesn't  2) Patient is back down in weight again-reports she is down 11lbs now!  Previous Successes:   1) Patient is really working to get back on track after having some really bad months toward the end of last year-wants to get back to her old routine where she lost 50lbs!  Areas in Need of Improvement:   1) Activity level/exercise and consistency of exercise  2) Diet modification-eating less/healthier foods  3) Building a healthy routine and maintaining it  4) Weight loss and maintanence  Barriers to Change:   1) Works nights-schedule changes monthly  2) Time-making time for self  3) Occasional knee pain  4) Trying to set up a regular routine/regained a lot of weight she had previously lost  5) Stress/had some kind of traumatic event at work-has been off work due to this and seeing a counselor  Reasons for Change:   1) Be healthier/move better-reduce strain on knees  2) Lose weight/avoid other health complications  3) Be around for grandkids/kids  Plan/Goal for the Next 4 Weeks:  GOAL #1: Journal food, activity level, mood, and other details each day  GOAL #1 Progress Toward Goal: 50% (Journaling some days but not every day as planned-does find this helpful)  GOAL #2: Focus on meal planning ahead of time and try to make healthy choices with the correct portion size  GOAL #2 Progress Toward Goal: 100% Maintenance (Has really stepped up her meal planning/cut back on portions and choosing healthier meals)  GOAL #3: Take time away from busy day-to-day routine and have some relaxation time for yourself  GOAL #3 Progress Toward Goal: 100% Maintnenace (Doing good on this-still off from work for  now)  GOAL #4: Track daily step count using fit-bit and aim for at least 6,000 daily steps  GOAL #4 Progress Toward Goal: 75% (Hitting 4,000 most days/plans to ramp this up with gym)  GOAL #5: Attend the [employer] 3 days a week doing a variety of exercise (On hold while on workers comp/finances)  GOAL #5 Progress Toward Goal: 25% (Was going to gym a bit, but financial challenges have put the [employer] membership on hold for now)  GOAL #6: Continue to prep meals ahead of time/learn more about prepping salads and other healthy recipes  GOAL #6 Progress Toward Goal: 0% New Goal  GOAL #7: Look at cheaper gym options for current situation or find ways to be more active at home (exercise bands, more steps, etc)  GOAL #7 Progress Toward Goal:  0% New Goal    Intervention:  Motivational Interviewing    MI Intervention: Expressed Empathy/Understanding, Supported Autonomy, Collaboration, Evocation, Permission to raise concern or advise, Open-ended questions, Reflections: simple and complex, Rolled with resistance: Simple reflection, Complex reflection, Shifted topic to defuse resistance, Reframed sustain talk in the direction of change and Evoked patient agenda and Change talk (evoked)     Change Talk Expressed by the Patient: Desire to change Ability to change Reasons to change Need to change Committment to change Activation Taking steps    Provider Response to Change Talk: E - Evoked more info from patient about behavior change, A - Affirmed patient's thoughts, decisions, or attempts at behavior change, R - Reflected patient's change talk and S - Summarized patient's change talk statements    Assessment / Progress on Treatment Objective(s) / Homework:    Achieved / completed to satisfaction - MAINTENANCE (Working to maintain change, with risk of relapse); Intervened by continuing to positively reinforce healthy behavior choice   Minimal progress - PREPARATION (Decided to change - considering how); Intervened by negotiating a change  plan and determining options / strategies for behavior change, identifying triggers, exploring social supports, and working towards setting a date to begin behavior change  New Objective established this session - CONTEMPLATION (Considering change and yet undecided); Intervened by assessing the negative and positive thinking (ambivalence) about behavior change  Satisfactory progress - ACTION (Actively working towards change); Intervened by reinforcing change plan / affirming steps taken         Plan: (Homework, other):  Patient was encouraged to continue to seek condition-related information and education, as well as schedule a follow up appointment with the Health  in 4 weeks. Patient has set self-identified goals and will monitor progress until the next appointment.  Scheduled our next coaching call for Monday March 5th at 9am.      Jacob Dominguez

## 2018-02-05 NOTE — MR AVS SNAPSHOT
After Visit Summary   2/5/2018    Keerthi Sloan    MRN: [id number]           Patient Information     Date Of Birth          1970        Visit Information        Provider Department      2/5/2018 9:00 AM HEALTH  - [address]        Today's Diagnoses     BMI 40.0-44.9, adult (H)    -  1    Prediabetes           Follow-ups after your visit        Your next 10 appointments already scheduled     Mar 05, 2018  9:00 AM CST   Telephone Visit with [address] ([facility])    [address] [place] [id number] [phone]           Note: this is not an onsite visit; there is no need to come to the facility.              Who to contact     If you have questions or need follow up information about today's clinic visit or your schedule please contact [facility] directly at [phone].  Normal or non-critical lab and imaging results will be communicated to you by MyChart, letter or phone within 4 business days after the clinic has received the results. If you do not hear from us within 7 days, please contact the clinic through Net[employer] or phone. If you have a critical or abnormal lab result, we will notify you by phone as soon as possible.  Submit refill requests through [employer] or call your pharmacy and they will forward the refill request to us. Please allow 3 business days for your refill to be completed.          Additional Information About Your Visit        Netradahart Information     [employer] gives you secure access to your electronic health record. If you see a primary care provider, you can also send messages to your care team and make appointments. If you have questions, please call your primary care clinic.  If you do not have a primary care provider, please call [phone] and they will assist you.        Care EveryWhere ID     This is your Care EveryWhere ID.  This could be used by other organizations to access your [facility] medical records  [id number]         Blood Pressure from Last 3 Encounters:   02/02/18 110/78   01/12/18 118/80   12/06/17 121/84    Weight from Last 3 Encounters:   02/02/18 275 lb (124.7 kg)   01/12/18 279 lb 3.2 oz (126.6 kg)   01/05/18 281 lb (127.5 kg)              Today, you had the following     No orders found for display       Primary Care Provider Office Phone # Fax #    Elida Cameron [phone] [address] [facility] [address]        Equal Access to Services     MITCHELL LEMON : Krishna Tenorio, waterry quevedo, carlos manuel kaalmada louis, holli garcia. So [facility] [phone].    ATENCIÓN: Si habla español, tiene a riggs disposición servicios gratuitos de asistencia lingüística. Llame al [phone].    We comply with applicable federal civil rights laws and [place] laws. We do not discriminate on the basis of race, color, national origin, age, disability, sex, sexual orientation, or gender identity.            Thank you!     Thank you for choosing [facility]  for your care. Our goal is always to provide you with excellent care. Hearing back from our patients is one way we can continue to improve our services. Please take a few minutes to complete the written survey that you may receive in the mail after your visit with us. Thank you!             Your Updated Medication List - Protect others around you: Learn how to safely use, store and throw away your medicines at www.disposemymeds.org.          This list is accurate as of 2/5/18 11:02 AM.  Always use your most recent med list.                   Brand Name Dispense Instructions for use Diagnosis    Ferrous Sulfate 27 MG Tabs      1 tab QD        metFORMIN 500 MG 24 hr tablet    GLUCOPHAGE-XR    180 tablet    Take 1 tablet (500 mg) by mouth 2 times daily (with meals)    Prediabetes, Vitamin D  deficiency       omeprazole 40 MG capsule    priLOSEC          phentermine 37.5 MG tablet    ADIPEX-P    32 tablet    Take 1 tablet (37.5 mg) by mouth every morning (before breakfast)    Morbid obesity due to excess calories (H)       ranitidine 150 MG tablet    ZANTAC    60 tablet    Take 1 tablet (150 mg) by mouth 2 times daily    Gastroesophageal reflux disease, esophagitis presence not specified       sertraline 50 MG tablet    ZOLOFT    30 tablet    Take 1/2 tablet (25 mg) for 1-2 weeks, then increase to 1 tablet orally daily    PTSD (post-traumatic stress disorder), Adjustment disorder with mixed anxiety and depressed mood       topiramate 50 MG tablet    TOPAMAX    180 tablet    Take 1 tablet (50 mg) by mouth 2 times daily    Vitamin D deficiency, Prediabetes, Morbid obesity due to excess calories (H)       traZODone 50 MG tablet    DESYREL    30 tablet    Take 1-2 tablets ( mg) by mouth nightly as needed for sleep    PTSD (post-traumatic stress disorder), Adjustment disorder with mixed anxiety and depressed mood, Adjustment insomnia       vitamin D 44708 UNIT capsule    ERGOCALCIFEROL    12 capsule    TAKE 1 CAPSULE BY MOUTH EVERY 7 DAYS    Vitamin D deficiency

## 2018-02-15 ENCOUNTER — OFFICE VISIT (OUTPATIENT)
Dept: FAMILY MEDICINE | Facility: CLINIC | Age: 48
End: 2018-02-15
Payer: OTHER MISCELLANEOUS

## 2018-02-15 VITALS
DIASTOLIC BLOOD PRESSURE: 76 MMHG | BODY MASS INDEX: 43.63 KG/M2 | SYSTOLIC BLOOD PRESSURE: 111 MMHG | RESPIRATION RATE: 12 BRPM | OXYGEN SATURATION: 96 % | TEMPERATURE: 98.1 F | HEART RATE: 69 BPM | WEIGHT: 278 LBS | HEIGHT: 67 IN

## 2018-02-15 DIAGNOSIS — F43.10 PTSD (POST-TRAUMATIC STRESS DISORDER): ICD-10-CM

## 2018-02-15 DIAGNOSIS — F51.02 ADJUSTMENT INSOMNIA: ICD-10-CM

## 2018-02-15 DIAGNOSIS — F43.23 ADJUSTMENT DISORDER WITH MIXED ANXIETY AND DEPRESSED MOOD: ICD-10-CM

## 2018-02-15 PROCEDURE — 99213 OFFICE O/P EST LOW 20 MIN: CPT | Performed by: FAMILY MEDICINE

## 2018-02-15 RX ORDER — TRAZODONE HYDROCHLORIDE 50 MG/1
50-150 TABLET, FILM COATED ORAL
Qty: 90 TABLET | Refills: 1 | Status: SHIPPED | OUTPATIENT
Start: 2018-02-15 | End: 2018-03-07

## 2018-02-15 RX ORDER — SULFAMETHOXAZOLE/TRIMETHOPRIM 800-160 MG
TABLET ORAL
COMMUNITY
Start: 2018-02-05 | End: 2018-03-07

## 2018-02-15 RX ORDER — OXYBUTYNIN CHLORIDE 10 MG/1
10 TABLET, EXTENDED RELEASE ORAL
COMMUNITY
Start: 2018-02-05 | End: 2020-09-17

## 2018-02-15 RX ORDER — OXYBUTYNIN CHLORIDE 10 MG/1
TABLET, EXTENDED RELEASE ORAL
COMMUNITY
Start: 2018-02-05 | End: 2018-03-07

## 2018-02-15 NOTE — MR AVS SNAPSHOT
After Visit Summary   2/15/2018    Keerthi Sloan    MRN: [id number]           Patient Information     Date Of Birth          1970        Visit Information        Provider Department      2/15/2018 12:40 PM Elida Cameron DO [facility]        Today's Diagnoses     PTSD (post-traumatic stress disorder)        Adjustment disorder with mixed anxiety and depressed mood        Adjustment insomnia           Follow-ups after your visit        Your next 10 appointments already scheduled     Mar 05, 2018  9:00 AM CST   Telephone Visit with HEALTH  - REGION 5   [facility] ([facility]    [address] [facility] [address] [phone]           Note: this is not an onsite visit; there is no need to come to the facility.            Mar 07, 2018  1:40 PM CST   SHORT with Elida Cameron DO   [facility] ([facility])    [address] [facility] [address] [phone]              Who to contact     If you have questions or need follow up information about today's clinic visit or your schedule please contact [facility] directly at [phone].  Normal or non-critical lab and imaging results will be communicated to you by MyChart, letter or phone within 4 business days after the clinic has received the results. If you do not hear from us within 7 days, please contact the clinic through MyChart or phone. If you have a critical or abnormal lab result, we will notify you by phone as soon as possible.  Submit refill requests through Reevoo or call your pharmacy and they will forward the refill request to us. Please allow 3 business days for your refill to be completed.          Additional Information About Your Visit        MyChart Information     Reevoo gives you secure access to your electronic health record. If you see a primary care  "provider, you can also send messages to your care team and make appointments. If you have questions, please call your primary care clinic.  If you do not have a primary care provider, please call [phone] and they will assist you.        Care EveryWhere ID     This is your Care EveryWhere ID. This could be used by other organizations to access your [facility] medical records  [id number]        Your Vitals Were     Pulse Temperature Respirations Height Pulse Oximetry BMI (Body Mass Index)    69 98.1  F (36.7  C) (Oral) 12 5' 7\" (1.702 m) 96% 43.54 kg/m2       Blood Pressure from Last 3 Encounters:   02/15/18 111/76   02/02/18 110/78   01/12/18 118/80    Weight from Last 3 Encounters:   02/15/18 278 lb (126.1 kg)   02/02/18 275 lb (124.7 kg)   01/12/18 279 lb 3.2 oz (126.6 kg)              Today, you had the following     No orders found for display         Today's Medication Changes          These changes are accurate as of 2/15/18  6:34 PM.  If you have any questions, ask your nurse or doctor.               These medicines have changed or have updated prescriptions.        Dose/Directions    traZODone 50 MG tablet   Commonly known as:  DESYREL   This may have changed:  how much to take   Used for:  PTSD (post-traumatic stress disorder), Adjustment disorder with mixed anxiety and depressed mood, Adjustment insomnia   Changed by:  Elida Cameron DO        Dose:   mg   Take 1-3 tablets ( mg) by mouth nightly as needed for sleep   Quantity:  90 tablet   Refills:  1            Where to get your medicines      These medications were sent to [facility] [place], [place] - [address]  [address] [facility] [address]     Phone:  [phone]     traZODone 50 MG tablet                Primary Care Provider Office Phone # Fax #    Elida Cameron [phone] [address] [facility] [address]        Equal Access to Services     MITCHELL LEMON AH: " Hadii aad ku hadsophiao Sojocelynali, waaxda luqadaha, qaybta kaalmada louis, holli constantinein hayaan sherinmichael cueto lamaryshirley radha. So [facility] [phone].    ATENCIÓN: Si daren jackson, tiene a riggs disposición servicios gratuitos de asistencia lingüística. Llame al [phone].    We comply with applicable federal civil rights laws and [place] laws. We do not discriminate on the basis of race, color, national origin, age, disability, sex, sexual orientation, or gender identity.            Thank you!     Thank you for choosing [facility]  for your care. Our goal is always to provide you with excellent care. Hearing back from our patients is one way we can continue to improve our services. Please take a few minutes to complete the written survey that you may receive in the mail after your visit with us. Thank you!             Your Updated Medication List - Protect others around you: Learn how to safely use, store and throw away your medicines at www.disposemymeds.org.          This list is accurate as of 2/15/18  6:34 PM.  Always use your most recent med list.                   Brand Name Dispense Instructions for use Diagnosis    Ferrous Sulfate 27 MG Tabs      1 tab QD        metFORMIN 500 MG 24 hr tablet    GLUCOPHAGE-XR    180 tablet    Take 1 tablet (500 mg) by mouth 2 times daily (with meals)    Prediabetes, Vitamin D deficiency       omeprazole 40 MG capsule    priLOSEC          * oxybutynin 10 MG 24 hr tablet    DITROPAN-XL     Take 10 mg by mouth        * oxybutynin 10 MG 24 hr tablet    DITROPAN-XL          phentermine 37.5 MG tablet    ADIPEX-P    32 tablet    Take 1 tablet (37.5 mg) by mouth every morning (before breakfast)    Morbid obesity due to excess calories (H)       ranitidine 150 MG tablet    ZANTAC    60 tablet    Take 1 tablet (150 mg) by mouth 2 times daily    Gastroesophageal reflux disease, esophagitis presence not specified       sertraline 50 MG tablet    ZOLOFT    30 tablet    Take  1/2 tablet (25 mg) for 1-2 weeks, then increase to 1 tablet orally daily    PTSD (post-traumatic stress disorder), Adjustment disorder with mixed anxiety and depressed mood       sulfamethoxazole-trimethoprim 800-160 MG per tablet    BACTRIM DS/SEPTRA DS          topiramate 50 MG tablet    TOPAMAX    180 tablet    Take 1 tablet (50 mg) by mouth 2 times daily    Vitamin D deficiency, Prediabetes, Morbid obesity due to excess calories (H)       traZODone 50 MG tablet    DESYREL    90 tablet    Take 1-3 tablets ( mg) by mouth nightly as needed for sleep    PTSD (post-traumatic stress disorder), Adjustment disorder with mixed anxiety and depressed mood, Adjustment insomnia       vitamin D 52428 UNIT capsule    ERGOCALCIFEROL    12 capsule    TAKE 1 CAPSULE BY MOUTH EVERY 7 DAYS    Vitamin D deficiency       * Notice:  This list has 2 medication(s) that are the same as other medications prescribed for you. Read the directions carefully, and ask your doctor or other care provider to review them with you.
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
## 2018-02-15 NOTE — LETTER
[facility]  [address] [place] [address]  Phone: [phone]    February 15, 2018        Keerthi Sloan  [address] [place] [address]          To whom it may concern:    RE: Keerthi Sloan    Patient was seen and treated today at our clinic.  She should continue to be off of work.  We will continue to re-evaluate her every 2-4 weeks.    Please contact me for questions or concerns.      Sincerely,        Elida Cameron, DO

## 2018-03-05 ENCOUNTER — VIRTUAL VISIT (OUTPATIENT)
Dept: NURSING | Facility: CLINIC | Age: 48
End: 2018-03-05

## 2018-03-05 DIAGNOSIS — R73.03 PREDIABETES: ICD-10-CM

## 2018-03-05 PROCEDURE — 99207 ZZC HEALTH COACHING, NO CHARGE: CPT

## 2018-03-05 NOTE — MR AVS SNAPSHOT
After Visit Summary   3/5/2018    Keerthi Sloan    MRN: [id number]           Patient Information     Date Of Birth          1970        Visit Information        Provider Department      3/5/2018 9:00 AM HEALTH  - [address]        Today's Diagnoses     BMI 40.0-44.9, adult (H)    -  1    Prediabetes           Follow-ups after your visit        Your next 10 appointments already scheduled     Mar 07, 2018  1:40 PM CST   SHORT with Elida Cameron,    [facility] ([facility])    [address] [facility] [address] [phone]            Apr 02, 2018  9:00 AM CDT   Telephone Visit with HEALTH  - [address] ([facility])    [address] [facility] [address] [phone]           Note: this is not an onsite visit; there is no need to come to the facility.              Who to contact     If you have questions or need follow up information about today's clinic visit or your schedule please contact [facility] directly at [phone].  Normal or non-critical lab and imaging results will be communicated to you by Postcard on the Runhart, letter or phone within 4 business days after the clinic has received the results. If you do not hear from us within 7 days, please contact the clinic through Postcard on the Runhart or phone. If you have a critical or abnormal lab result, we will notify you by phone as soon as possible.  Submit refill requests through BFKW or call your pharmacy and they will forward the refill request to us. Please allow 3 business days for your refill to be completed.          Additional Information About Your Visit        MyChart Information     BFKW gives you secure access to your electronic health record. If you see a primary care provider, you can also send messages to your care team and make appointments. If you have  questions, please call your primary care clinic.  If you do not have a primary care provider, please call [phone] and they will assist you.        Care EveryWhere ID     This is your Care EveryWhere ID. This could be used by other organizations to access your [facility] medical records  [id number]         Blood Pressure from Last 3 Encounters:   02/15/18 111/76   02/02/18 110/78   01/12/18 118/80    Weight from Last 3 Encounters:   02/15/18 278 lb (126.1 kg)   02/02/18 275 lb (124.7 kg)   01/12/18 279 lb 3.2 oz (126.6 kg)              Today, you had the following     No orders found for display       Primary Care Provider Office Phone # Fax #    Elida Cameron  [phone] [address] [facility] [address]        Equal Access to Services     [facility] : Hadii edis alarcon hadasho Soomaali, waaxda luqadaha, qaybta kaalmada adeegyada, holli [place] . So [facility] [phone].    ATENCIÓN: Si habla español, tiene a riggs disposición servicios gratuitos de asistencia lingüística. Llame al [phone].    We comply with applicable federal civil rights laws and [place] laws. We do not discriminate on the basis of race, color, national origin, age, disability, sex, sexual orientation, or gender identity.            Thank you!     Thank you for choosing [facility]  for your care. Our goal is always to provide you with excellent care. Hearing back from our patients is one way we can continue to improve our services. Please take a few minutes to complete the written survey that you may receive in the mail after your visit with us. Thank you!             Your Updated Medication List - Protect others around you: Learn how to safely use, store and throw away your medicines at www.disposemymeds.org.          This list is accurate as of 3/5/18 11:13 AM.  Always use your most recent med list.                   Brand Name Dispense Instructions for use  Diagnosis    Ferrous Sulfate 27 MG Tabs      1 tab QD        metFORMIN 500 MG 24 hr tablet    GLUCOPHAGE-XR    180 tablet    Take 1 tablet (500 mg) by mouth 2 times daily (with meals)    Prediabetes, Vitamin D deficiency       omeprazole 40 MG capsule    priLOSEC          * oxybutynin 10 MG 24 hr tablet    DITROPAN-XL     Take 10 mg by mouth        * oxybutynin 10 MG 24 hr tablet    DITROPAN-XL          phentermine 37.5 MG tablet    ADIPEX-P    32 tablet    Take 1 tablet (37.5 mg) by mouth every morning (before breakfast)    Morbid obesity due to excess calories (H)       ranitidine 150 MG tablet    ZANTAC    60 tablet    Take 1 tablet (150 mg) by mouth 2 times daily    Gastroesophageal reflux disease, esophagitis presence not specified       sertraline 50 MG tablet    ZOLOFT    30 tablet    Take 1/2 tablet (25 mg) for 1-2 weeks, then increase to 1 tablet orally daily    PTSD (post-traumatic stress disorder), Adjustment disorder with mixed anxiety and depressed mood       sulfamethoxazole-trimethoprim 800-160 MG per tablet    BACTRIM DS/SEPTRA DS          topiramate 50 MG tablet    TOPAMAX    180 tablet    Take 1 tablet (50 mg) by mouth 2 times daily    Vitamin D deficiency, Prediabetes, Morbid obesity due to excess calories (H)       traZODone 50 MG tablet    DESYREL    90 tablet    Take 1-3 tablets ( mg) by mouth nightly as needed for sleep    PTSD (post-traumatic stress disorder), Adjustment disorder with mixed anxiety and depressed mood, Adjustment insomnia       vitamin D 98734 UNIT capsule    ERGOCALCIFEROL    12 capsule    TAKE 1 CAPSULE BY MOUTH EVERY 7 DAYS    Vitamin D deficiency       * Notice:  This list has 2 medication(s) that are the same as other medications prescribed for you. Read the directions carefully, and ask your doctor or other care provider to review them with you.
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
## 2018-03-07 ENCOUNTER — OFFICE VISIT (OUTPATIENT)
Dept: FAMILY MEDICINE | Facility: CLINIC | Age: 48
End: 2018-03-07
Payer: OTHER MISCELLANEOUS

## 2018-03-07 VITALS
RESPIRATION RATE: 14 BRPM | DIASTOLIC BLOOD PRESSURE: 80 MMHG | TEMPERATURE: 97.5 F | HEART RATE: 81 BPM | SYSTOLIC BLOOD PRESSURE: 102 MMHG | BODY MASS INDEX: 43.07 KG/M2 | OXYGEN SATURATION: 99 % | WEIGHT: 275 LBS

## 2018-03-07 DIAGNOSIS — F43.23 ADJUSTMENT DISORDER WITH MIXED ANXIETY AND DEPRESSED MOOD: ICD-10-CM

## 2018-03-07 DIAGNOSIS — F43.10 PTSD (POST-TRAUMATIC STRESS DISORDER): ICD-10-CM

## 2018-03-07 DIAGNOSIS — Z02.6 ENCOUNTER RELATED TO WORKER'S COMPENSATION CLAIM: Primary | ICD-10-CM

## 2018-03-07 DIAGNOSIS — F51.02 ADJUSTMENT INSOMNIA: ICD-10-CM

## 2018-03-07 PROCEDURE — 99213 OFFICE O/P EST LOW 20 MIN: CPT | Performed by: FAMILY MEDICINE

## 2018-03-07 RX ORDER — TRAZODONE HYDROCHLORIDE 150 MG/1
150 TABLET ORAL
Qty: 90 TABLET | Refills: 1 | Status: SHIPPED | OUTPATIENT
Start: 2018-03-07 | End: 2018-10-30

## 2018-03-07 NOTE — MR AVS SNAPSHOT
After Visit Summary   3/7/2018    Keerthi Sloan    MRN: [id number]           Patient Information     Date Of Birth          1970        Visit Information        Provider Department      3/7/2018 1:40 PM Elida Cameron DO [facility]        Today's Diagnoses     Encounter related to worker's compensation claim    -  1    PTSD (post-traumatic stress disorder)        Adjustment disorder with mixed anxiety and depressed mood        Adjustment insomnia           Follow-ups after your visit        Your next 10 appointments already scheduled     Apr 02, 2018  9:00 AM CDT   Telephone Visit with HEALTH  - REGION 5   [facility] ([facility])    [id number] [place] [address] [phone]           Note: this is not an onsite visit; there is no need to come to the facility.            Apr 06, 2018 11:40 AM CDT   SHORT with Elida Cameron DO   [facility] ([facility]    [address] [facility] [address] [phone]              Who to contact     If you have questions or need follow up information about today's clinic visit or your schedule please contact [facility] directly at [phone].  Normal or non-critical lab and imaging results will be communicated to you by MyChart, letter or phone within 4 business days after the clinic has received the results. If you do not hear from us within 7 days, please contact the clinic through [employer] or phone. If you have a critical or abnormal lab result, we will notify you by phone as soon as possible.  Submit refill requests through [employer] or call your pharmacy and they will forward the refill request to us. Please allow 3 business days for your refill to be completed.          Additional Information About Your Visit        [employer] Information     [employer] gives you secure access to your  electronic health record. If you see a primary care provider, you can also send messages to your care team and make appointments. If you have questions, please call your primary care clinic.  If you do not have a primary care provider, please call [phone] and they will assist you.        Care EveryWhere ID     This is your Care EveryWhere ID. This could be used by other organizations to access your [facility] medical records  [id number]        Your Vitals Were     Pulse Temperature Respirations Pulse Oximetry BMI (Body Mass Index)       81 97.5  F (36.4  C) (Oral) 14 99% 43.07 kg/m2        Blood Pressure from Last 3 Encounters:   03/07/18 102/80   02/15/18 111/76   02/02/18 110/78    Weight from Last 3 Encounters:   03/07/18 275 lb (124.7 kg)   02/15/18 278 lb (126.1 kg)   02/02/18 275 lb (124.7 kg)              We Performed the Following     DEPRESSION ACTION PLAN (DAP)          Today's Medication Changes          These changes are accurate as of 3/7/18  3:02 PM.  If you have any questions, ask your nurse or doctor.               These medicines have changed or have updated prescriptions.        Dose/Directions    oxybutynin 10 MG 24 hr tablet   Commonly known as:  DITROPAN-XL   This may have changed:  Another medication with the same name was removed. Continue taking this medication, and follow the directions you see here.   Changed by:  Elida Cameron DO        Dose:  10 mg   Take 10 mg by mouth   Refills:  0       sertraline 50 MG tablet   Commonly known as:  ZOLOFT   This may have changed:    - how much to take  - how to take this  - when to take this   Used for:  PTSD (post-traumatic stress disorder), Adjustment disorder with mixed anxiety and depressed mood   Changed by:  Elida Cameron DO        Dose:  50 mg   Take 1 tablet (50 mg) by mouth daily Take 1/2 tablet (25 mg) for 1-2 weeks, then increase to 1 tablet orally daily   Quantity:  90 tablet   Refills:  1       traZODone 150 MG tablet    Commonly known as:  DESYREL   This may have changed:    - medication strength  - how much to take   Used for:  PTSD (post-traumatic stress disorder), Adjustment disorder with mixed anxiety and depressed mood, Adjustment insomnia   Changed by:  Elida Cameron DO        Dose:  150 mg   Take 1 tablet (150 mg) by mouth nightly as needed for sleep   Quantity:  90 tablet   Refills:  1         Stop taking these medicines if you haven't already. Please contact your care team if you have questions.     sulfamethoxazole-trimethoprim 800-160 MG per tablet   Commonly known as:  BACTRIM DS/SEPTRA DS   Stopped by:  Elida Cameron DO                Where to get your medicines      These medications were sent to [facility] [address]  [address] [facility] [address]     Phone:  [phone]     sertraline 50 MG tablet    traZODone 150 MG tablet                Primary Care Provider Office Phone # Fax #    Elida Cameron [phone] [address] [facility] [address]        Equal Access to Services     [facility]: Hadii edis alarcon hadasho Soshreyas, waaxda luqadaha, qaybta kaalmada adeegyasaulo, holli wright . So [facility] [phone].    ATENCIÓN: Si habla español, tiene a riggs disposición servicios gratuitos de asistencia lingüística. [facility] [phone].    We comply with applicable federal civil rights laws and [place] laws. We do not discriminate on the basis of race, color, national origin, age, disability, sex, sexual orientation, or gender identity.            Thank you!     Thank you for choosing [facility]  for your care. Our goal is always to provide you with excellent care. Hearing back from our patients is one way we can continue to improve our services. Please take a few minutes to complete the written survey that you may receive in the mail after your visit with us. Thank you!              Your Updated Medication List - Protect others around you: Learn how to safely use, store and throw away your medicines at www.disposemymeds.org.          This list is accurate as of 3/7/18  3:02 PM.  Always use your most recent med list.                   Brand Name Dispense Instructions for use Diagnosis    Ferrous Sulfate 27 MG Tabs      1 tab QD        metFORMIN 500 MG 24 hr tablet    GLUCOPHAGE-XR    180 tablet    Take 1 tablet (500 mg) by mouth 2 times daily (with meals)    Prediabetes, Vitamin D deficiency       omeprazole 40 MG capsule    priLOSEC          oxybutynin 10 MG 24 hr tablet    DITROPAN-XL     Take 10 mg by mouth        phentermine 37.5 MG tablet    ADIPEX-P    32 tablet    Take 1 tablet (37.5 mg) by mouth every morning (before breakfast)    Morbid obesity due to excess calories (H)       ranitidine 150 MG tablet    ZANTAC    60 tablet    Take 1 tablet (150 mg) by mouth 2 times daily    Gastroesophageal reflux disease, esophagitis presence not specified       sertraline 50 MG tablet    ZOLOFT    90 tablet    Take 1 tablet (50 mg) by mouth daily Take 1/2 tablet (25 mg) for 1-2 weeks, then increase to 1 tablet orally daily    PTSD (post-traumatic stress disorder), Adjustment disorder with mixed anxiety and depressed mood       topiramate 50 MG tablet    TOPAMAX    180 tablet    Take 1 tablet (50 mg) by mouth 2 times daily    Vitamin D deficiency, Prediabetes, Morbid obesity due to excess calories (H)       traZODone 150 MG tablet    DESYREL    90 tablet    Take 1 tablet (150 mg) by mouth nightly as needed for sleep    PTSD (post-traumatic stress disorder), Adjustment disorder with mixed anxiety and depressed mood, Adjustment insomnia       vitamin D 16470 UNIT capsule    ERGOCALCIFEROL    12 capsule    TAKE 1 CAPSULE BY MOUTH EVERY 7 DAYS    Vitamin D deficiency
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
## 2018-03-07 NOTE — LETTER
[address] [address]  Phone: [phone]    March 7, 2018        Keerthi Sloan  [address] [place] [address]        To whom it may concern:     RE: Keerthi Sloan     Patient was seen and treated today at our clinic.  She should continue to be off of work for now.  Her mental health team will help her to decide when/if it is appropriate for her to return to work.  We will continue to re-evaluate her every 4 weeks.     Please contact me for questions or concerns.        Sincerely,           Elida Cameron, DO

## 2018-04-02 ENCOUNTER — VIRTUAL VISIT (OUTPATIENT)
Dept: NURSING | Facility: CLINIC | Age: 48
End: 2018-04-02

## 2018-04-02 DIAGNOSIS — R73.03 PREDIABETES: ICD-10-CM

## 2018-04-02 PROCEDURE — 99207 ZZC HEALTH COACHING, NO CHARGE: CPT

## 2018-04-02 NOTE — MR AVS SNAPSHOT
After Visit Summary   4/2/2018    Keerthi Sloan    MRN: [id number]           Patient Information     Date Of Birth          1970        Visit Information        Provider Department      4/2/2018 9:00 AM HEALTH  - REGION 5 [facility]        Today's Diagnoses     BMI 40.0-44.9, adult (H)    -  1    Prediabetes           Follow-ups after your visit        Your next 10 appointments already scheduled     Apr 06, 2018 11:40 AM CDT   SHORT with Elida Cameron,    [facility] ([facility])    [address] [place] [id number] [phone]              Who to contact     If you have questions or need follow up information about today's clinic visit or your schedule please contact [facility] directly at [phone].  Normal or non-critical lab and imaging results will be communicated to you by MyChart, letter or phone within 4 business days after the clinic has received the results. If you do not hear from us within 7 days, please contact the clinic through MyChart or phone. If you have a critical or abnormal lab result, we will notify you by phone as soon as possible.  Submit refill requests through [employer] or call your pharmacy and they will forward the refill request to us. Please allow 3 business days for your refill to be completed.          Additional Information About Your Visit        MyChart Information     [employer] gives you secure access to your electronic health record. If you see a primary care provider, you can also send messages to your care team and make appointments. If you have questions, please call your primary care clinic.  If you do not have a primary care provider, please call [phone] and they will assist you.        Care EveryWhere ID     This is your Care EveryWhere ID. This could be used by other organizations to access your [facility] medical records  [id number]          Blood Pressure from Last 3 Encounters:   03/07/18 102/80   02/15/18 111/76   02/02/18 110/78    Weight from Last 3 Encounters:   03/07/18 275 lb (124.7 kg)   02/15/18 278 lb (126.1 kg)   02/02/18 275 lb (124.7 kg)              Today, you had the following     No orders found for display       Primary Care Provider Office Phone # Fax #    Elida Cameron [phone] [address] [address] [facility] [address]        Equal Access to Services     [facility]: Hadii aad dorian hadasho Soomaali, waaxda luqadaha, qaybta kaalmada adestephanie, holli wright . So [facility] [phone].    ATENCIÓN: Si habla español, tiene a riggs disposición servicios gratuitos de asistencia lingüística. [facility] [phone].    We comply with applicable federal civil rights laws and [place] laws. We do not discriminate on the basis of race, color, national origin, age, disability, sex, sexual orientation, or gender identity.            Thank you!     Thank you for choosing [facility]  for your care. Our goal is always to provide you with excellent care. Hearing back from our patients is one way we can continue to improve our services. Please take a few minutes to complete the written survey that you may receive in the mail after your visit with us. Thank you!             Your Updated Medication List - Protect others around you: Learn how to safely use, store and throw away your medicines at www.disposemymeds.org.          This list is accurate as of 4/2/18 11:24 AM.  Always use your most recent med list.                   Brand Name Dispense Instructions for use Diagnosis    Ferrous Sulfate 27 MG Tabs      1 tab QD        metFORMIN 500 MG 24 hr tablet    GLUCOPHAGE-XR    180 tablet    Take 1 tablet (500 mg) by mouth 2 times daily (with meals)    Prediabetes, Vitamin D deficiency       omeprazole 40 MG capsule    priLOSEC          oxybutynin 10 MG 24 hr tablet    DITROPAN-XL      Take 10 mg by mouth        phentermine 37.5 MG tablet    ADIPEX-P    32 tablet    Take 1 tablet (37.5 mg) by mouth every morning (before breakfast)    Morbid obesity due to excess calories (H)       ranitidine 150 MG tablet    ZANTAC    60 tablet    Take 1 tablet (150 mg) by mouth 2 times daily    Gastroesophageal reflux disease, esophagitis presence not specified       sertraline 50 MG tablet    ZOLOFT    90 tablet    Take 1 tablet (50 mg) by mouth daily Take 1/2 tablet (25 mg) for 1-2 weeks, then increase to 1 tablet orally daily    PTSD (post-traumatic stress disorder), Adjustment disorder with mixed anxiety and depressed mood       topiramate 50 MG tablet    TOPAMAX    180 tablet    Take 1 tablet (50 mg) by mouth 2 times daily    Vitamin D deficiency, Prediabetes, Morbid obesity due to excess calories (H)       traZODone 150 MG tablet    DESYREL    90 tablet    Take 1 tablet (150 mg) by mouth nightly as needed for sleep    PTSD (post-traumatic stress disorder), Adjustment disorder with mixed anxiety and depressed mood, Adjustment insomnia       vitamin D 01858 UNIT capsule    ERGOCALCIFEROL    12 capsule    TAKE 1 CAPSULE BY MOUTH EVERY 7 DAYS    Vitamin D deficiency
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
## 2018-04-06 ENCOUNTER — OFFICE VISIT (OUTPATIENT)
Dept: FAMILY MEDICINE | Facility: CLINIC | Age: 48
End: 2018-04-06
Payer: OTHER MISCELLANEOUS

## 2018-04-06 VITALS
SYSTOLIC BLOOD PRESSURE: 130 MMHG | TEMPERATURE: 98 F | WEIGHT: 280 LBS | BODY MASS INDEX: 43.95 KG/M2 | OXYGEN SATURATION: 95 % | HEIGHT: 67 IN | DIASTOLIC BLOOD PRESSURE: 74 MMHG | RESPIRATION RATE: 12 BRPM | HEART RATE: 74 BPM

## 2018-04-06 DIAGNOSIS — Z02.6 ENCOUNTER RELATED TO WORKER'S COMPENSATION CLAIM: Primary | ICD-10-CM

## 2018-04-06 DIAGNOSIS — F43.22 ADJUSTMENT DISORDER WITH ANXIOUS MOOD: ICD-10-CM

## 2018-04-06 DIAGNOSIS — F43.10 PTSD (POST-TRAUMATIC STRESS DISORDER): ICD-10-CM

## 2018-04-06 PROCEDURE — 99213 OFFICE O/P EST LOW 20 MIN: CPT | Performed by: FAMILY MEDICINE

## 2018-04-06 NOTE — LETTER
[facility]  [id number] [place] [id number]  Phone: [phone]    April 6, 2018        Keerthi Sloan  [address] [place] [id number]          To whom it may concern:    RE: Keerthi Sloan    Patient was seen and treated today at our clinic.    She has decided that she is ready to slowly return back to her work duties.  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      Please contact me for questions or concerns.      Sincerely,        Elida Cameron, DO

## 2018-04-06 NOTE — MR AVS SNAPSHOT
After Visit Summary   4/6/2018    Keerthi Sloan    MRN: [id number]           Patient Information     Date Of Birth          1970        Visit Information        Provider Department      4/6/2018 11:40 AM Elida Cameron DO [facility]        Today's Diagnoses     Encounter related to worker's compensation claim    -  1    PTSD (post-traumatic stress disorder)        Adjustment disorder with anxious mood           Follow-ups after your visit        Your next 10 appointments already scheduled     May 07, 2018 10:00 AM CDT   SHORT with Elida Cameron DO   [facility]    [id number] [place] [id number] [phone]              Who to contact     If you have questions or need follow up information about today's clinic visit or your schedule please contact [facility] directly at [phone].  Normal or non-critical lab and imaging results will be communicated to you by MyChart, letter or phone within 4 business days after the clinic has received the results. If you do not hear from us within 7 days, please contact the clinic through BuyHappyhart or phone. If you have a critical or abnormal lab result, we will notify you by phone as soon as possible.  Submit refill requests through [email or web address] or call your pharmacy and they will forward the refill request to us. Please allow 3 business days for your refill to be completed.          Additional Information About Your Visit        MyChart Information     [email or web address] gives you secure access to your electronic health record. If you see a primary care provider, you can also send messages to your care team and make appointments. If you have questions, please call your primary care clinic.  If you do not have a primary care provider, please call [phone] and they will assist you.        Care EveryWhere ID     This is your Care EveryWhere ID. This  "could be used by other organizations to access your [facility] medical records  [id number]        Your Vitals Were     Pulse Temperature Respirations Height Pulse Oximetry BMI (Body Mass Index)    74 98  F (36.7  C) (Oral) 12 5' 7\" (1.702 m) 95% 43.85 kg/m2       Blood Pressure from Last 3 Encounters:   04/06/18 130/74   03/07/18 102/80   02/15/18 111/76    Weight from Last 3 Encounters:   04/06/18 280 lb (127 kg)   03/07/18 275 lb (124.7 kg)   02/15/18 278 lb (126.1 kg)              Today, you had the following     No orders found for display       Primary Care Provider Office Phone # Fax #    Elida Cameron  [phone] [address] [facility] [address]        Equal Access to Services     NICOLETTE [facility] CenterMESSI : Hadii aad ku hadasho Soshreyas, waaxda luqadaha, qaybta kaalmada adeegyada, holli wright . So [facility] [phone].    ATENCIÓN: Si habla español, tiene a riggs disposición servicios gratuitos de asistencia lingüística. Frnakie al [phone].    We comply with applicable federal civil rights laws and [place] laws. We do not discriminate on the basis of race, color, national origin, age, disability, sex, sexual orientation, or gender identity.            Thank you!     Thank you for choosing [facility]  for your care. Our goal is always to provide you with excellent care. Hearing back from our patients is one way we can continue to improve our services. Please take a few minutes to complete the written survey that you may receive in the mail after your visit with us. Thank you!             Your Updated Medication List - Protect others around you: Learn how to safely use, store and throw away your medicines at www.disposemymeds.org.          This list is accurate as of 4/6/18  2:26 PM.  Always use your most recent med list.                   Brand Name Dispense Instructions for use Diagnosis    Ferrous Sulfate 27 MG Tabs      1 tab QD        " metFORMIN 500 MG 24 hr tablet    GLUCOPHAGE-XR    180 tablet    Take 1 tablet (500 mg) by mouth 2 times daily (with meals)    Prediabetes, Vitamin D deficiency       omeprazole 40 MG capsule    priLOSEC          oxybutynin 10 MG 24 hr tablet    DITROPAN-XL     Take 10 mg by mouth        phentermine 37.5 MG tablet    ADIPEX-P    32 tablet    Take 1 tablet (37.5 mg) by mouth every morning (before breakfast)    Morbid obesity due to excess calories (H)       ranitidine 150 MG tablet    ZANTAC    60 tablet    Take 1 tablet (150 mg) by mouth 2 times daily    Gastroesophageal reflux disease, esophagitis presence not specified       sertraline 50 MG tablet    ZOLOFT    90 tablet    Take 1 tablet (50 mg) by mouth daily Take 1/2 tablet (25 mg) for 1-2 weeks, then increase to 1 tablet orally daily    PTSD (post-traumatic stress disorder), Adjustment disorder with mixed anxiety and depressed mood       topiramate 50 MG tablet    TOPAMAX    180 tablet    Take 1 tablet (50 mg) by mouth 2 times daily    Vitamin D deficiency, Prediabetes, Morbid obesity due to excess calories (H)       traZODone 150 MG tablet    DESYREL    90 tablet    Take 1 tablet (150 mg) by mouth nightly as needed for sleep    PTSD (post-traumatic stress disorder), Adjustment disorder with mixed anxiety and depressed mood, Adjustment insomnia       vitamin D 51713 UNIT capsule    ERGOCALCIFEROL    12 capsule    TAKE 1 CAPSULE BY MOUTH EVERY 7 DAYS    Vitamin D deficiency
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
## 2018-05-09 ENCOUNTER — VIRTUAL VISIT (OUTPATIENT)
Dept: NURSING | Facility: CLINIC | Age: 48
End: 2018-05-09

## 2018-05-09 DIAGNOSIS — R73.03 PREDIABETES: ICD-10-CM

## 2018-05-09 PROCEDURE — 99207 ZZC HEALTH COACHING, NO CHARGE: CPT

## 2018-05-09 NOTE — MR AVS SNAPSHOT
After Visit Summary   5/9/2018    Keerthi Sloan    MRN: [id number]           Patient Information     Date Of Birth          1970        Visit Information        Provider Department      5/9/2018 4:00 PM HEALTH  - [address]        Today's Diagnoses     BMI 40.0-44.9, adult (H)    -  1    Prediabetes           Follow-ups after your visit        Your next 10 appointments already scheduled     Jun 08, 2018 10:00 AM CDT   Telephone Visit with [facility]  - [address] ([facility])    [address] [place] [id number] [phone]           Note: this is not an onsite visit; there is no need to come to the facility.              Who to contact     If you have questions or need follow up information about today's clinic visit or your schedule please contact [facility] directly at [phone].  Normal or non-critical lab and imaging results will be communicated to you by Devexhart, letter or phone within 4 business days after the clinic has received the results. If you do not hear from us within 7 days, please contact the clinic through Devexhart or phone. If you have a critical or abnormal lab result, we will notify you by phone as soon as possible.  Submit refill requests through Chronogolf or call your pharmacy and they will forward the refill request to us. Please allow 3 business days for your refill to be completed.          Additional Information About Your Visit        MyChart Information     Chronogolf gives you secure access to your electronic health record. If you see a primary care provider, you can also send messages to your care team and make appointments. If you have questions, please call your primary care clinic.  If you do not have a primary care provider, please call [phone] and they will assist you.        Care EveryWhere ID     This is your Care EveryWhere ID.  This could be used by other organizations to access your [facility] medical records  [id number]         Blood Pressure from Last 3 Encounters:   04/06/18 130/74   03/07/18 102/80   02/15/18 111/76    Weight from Last 3 Encounters:   04/06/18 280 lb (127 kg)   03/07/18 275 lb (124.7 kg)   02/15/18 278 lb (126.1 kg)              Today, you had the following     No orders found for display       Primary Care Provider Office Phone # Fax #    Elida Cameron [phone] [address]  [facility] [address]        Equal Access to Services     NICOLETTE LEMON : Hadii edis Tenorio, waaxda luzenaadaha, qaybta kaalmada louis, holli garcia. So [facility] [phone].    ATENCIÓN: Si habla español, tiene a riggs disposición servicios gratuitos de asistencia lingüística. Llame al [phone].    We comply with applicable federal civil rights laws and [place] laws. We do not discriminate on the basis of race, color, national origin, age, disability, sex, sexual orientation, or gender identity.            Thank you!     Thank you for choosing [facility]  for your care. Our goal is always to provide you with excellent care. Hearing back from our patients is one way we can continue to improve our services. Please take a few minutes to complete the written survey that you may receive in the mail after your visit with us. Thank you!             Your Updated Medication List - Protect others around you: Learn how to safely use, store and throw away your medicines at www.disposemymeds.org.          This list is accurate as of 5/9/18 11:59 PM.  Always use your most recent med list.                   Brand Name Dispense Instructions for use Diagnosis    Ferrous Sulfate 27 MG Tabs      1 tab QD        metFORMIN 500 MG 24 hr tablet    GLUCOPHAGE-XR    180 tablet    Take 1 tablet (500 mg) by mouth 2 times daily (with meals)    Prediabetes, Vitamin D deficiency        omeprazole 40 MG capsule    priLOSEC          oxybutynin 10 MG 24 hr tablet    DITROPAN-XL     Take 10 mg by mouth        phentermine 37.5 MG tablet    ADIPEX-P    32 tablet    Take 1 tablet (37.5 mg) by mouth every morning (before breakfast)    Morbid obesity due to excess calories (H)       ranitidine 150 MG tablet    ZANTAC    60 tablet    Take 1 tablet (150 mg) by mouth 2 times daily    Gastroesophageal reflux disease, esophagitis presence not specified       sertraline 50 MG tablet    ZOLOFT    90 tablet    Take 1 tablet (50 mg) by mouth daily Take 1/2 tablet (25 mg) for 1-2 weeks, then increase to 1 tablet orally daily    PTSD (post-traumatic stress disorder), Adjustment disorder with mixed anxiety and depressed mood       topiramate 50 MG tablet    TOPAMAX    180 tablet    Take 1 tablet (50 mg) by mouth 2 times daily    Vitamin D deficiency, Prediabetes, Morbid obesity due to excess calories (H)       traZODone 150 MG tablet    DESYREL    90 tablet    Take 1 tablet (150 mg) by mouth nightly as needed for sleep    PTSD (post-traumatic stress disorder), Adjustment disorder with mixed anxiety and depressed mood, Adjustment insomnia       vitamin D 31021 UNIT capsule    ERGOCALCIFEROL    12 capsule    TAKE 1 CAPSULE BY MOUTH EVERY 7 DAYS    Vitamin D deficiency

## 2018-05-10 NOTE — PROGRESS NOTES
May 10, 2018    [facility]  [address] [facility] [address]  [phone]  Health Coaching Progress Note    Patient Name: Keerthi Sloan Date: May 10, 2018      Session Length: 30      DATA    PRM Master Survey Scores Reviewed: Yes    Core Healthy Days Survey:         YAQUELIN Score (Last Two) 5/17/2017 7/24/2017   YAQUELIN Raw Score 33 32   Activation Score 65.8 62.6   YAQUELIN Level 3 3       PHQ-2 Score 2/2/2018 7/24/2017   PHQ-2 Total Score Interpretation - Positive if 3 or more points; Administer PHQ-9 if positive 2 0   MyC PHQ-2 Score - -       PHQ-9 SCORE 2/2/2018   Total Score 20       Treatment Objective(s) Addressed in This Session:  Target Behavior(s): diet/weight loss and disease management/lifestyle changes of making sure to have healthy foods on hand and pre-planning meals ahead of time, prepping foods ahead of time/healthy recipes/kelley jar salads, making healthier dietary choices/not skipping meals/portion size, using a journal to stay honest on diet/exercise, getting into an exercise routine again-looking at cheaper gym options ([employer] or [facility]/looking at [place] [place]-pool access), biking outdoors with her granddaughter, making time for self/relaxation, tracking steps with fit-bit (needs to find it) or her phone and find her new average, 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, limited activity due to ongoing knee pain/really wants to get back to a pool for exercise-looking at options, remebering to use her journal-recording diet/exercise/mood, frustrated she gained a lot of weight lost back again, job has changing hours each  month/works nights, staying on track with her diet, prepping for cooking/using a plastic pre-portion meal planning container, being more active as able, staying motivated      What Patient Does Well:   1) Patient is working hard to get back on track with a regular exercise routine and better eating habits-continues to learn what works and what doesn't  Previous Successes:   1) Patient is really working to get back on track after having some really bad months toward the end of last year-wants to get back to her old routine where she lost 50lbs!  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  Reasons for Change:   1) Be healthier/move better-reduce strain on knees  2) Lose weight/avoid other health complications  3) Be around for grandkids/kids  Plan/Goal for the Next 4 Weeks:   GOAL #1: Try to lose at least 5lbs over the next month by eating healthy and getting more exercise  GOAL #1 Progress Toward Goal: 0% (Weight has been up and down-was up at last weigh-in in clinic, but reports it went back down to where she was at the weigh-in prior to the last in-clinic visit)  GOAL #2: Look at cheaper gym options for current situation or find ways to be more active at home (exercise bands, more steps, etc)   GOAL #2 Progress Toward Goal: 50% (Still considering other options for exercise-waiting to see what her new routine is like when going back to work)  GOAL #3: Track daily step count using fit-bit and aim for at least 6,000 daily  steps  GOAL #3 Progress Toward Goal: Goal no longer appropriate (Hasn't been able to find her fit-bit/plans to start tracking again with either that or her phone and find her new average to get back on track)  GOAL #4: Continue to prep meals ahead of time/learn more about prepping salads and other healthy recipes  GOAL #4 Progress Toward Goal: 100% (Still prepping ahead of time/has been packing a lunch most days at training and plans to continue this when back to work officially in a few weeks)  GOAL #5: Journal food, activity level, mood, and other details each day  GOAL #5 Progress Toward Goal: 50% (Hasn't been a disciplined with her journaling, but does want to get back to this-plans to get back on track)  GOAL #6: Go biking with granddaughter 3 times a week for 30 minutes each time  GOAL #6 Progress Toward Goal: 0% New Goal!  GOAL #7: Find fit-bit or use phone [place] and start tracking daily steps again to find new average after returning back to work  GOAL #7 Progress Toward Goal: 0% New Goal!    Intervention:  Motivational Interviewing    MI Intervention: Expressed Empathy/Understanding, Supported Autonomy, Collaboration, Evocation, Permission to raise concern or advise, Open-ended questions, Reflections: simple and complex, Rolled with resistance: Simple reflection, Complex reflection, Shifted topic to defuse resistance, Reframed sustain talk in the direction of change and Evoked patient agenda and Change talk (evoked)     Change Talk Expressed by the Patient: Desire to change Ability to change Reasons to change Need to change Committment to change Activation Taking steps    Provider Response to Change Talk: E - Evoked more info from patient about behavior change, A - Affirmed patient's thoughts, decisions, or attempts at behavior change, R - Reflected patient's change talk and S - Summarized patient's change talk statements    Assessment / Progress on Treatment Objective(s) / Homework:    Minimal progress -  PREPARATION (Decided to change - considering how); Intervened by negotiating a change plan and determining options / strategies for behavior change, identifying triggers, exploring social supports, and working towards setting a date to begin behavior change  New Objective established this session - PREPARATION (Decided to change - considering how); Intervened by negotiating a change plan and determining options / strategies for behavior change, identifying triggers, exploring social supports, and working towards setting a date to begin behavior change  No improvement - CONTEMPLATION (Considering change and yet undecided); Intervened by assessing the negative and positive thinking (ambivalence) about behavior change  Satisfactory progress - ACTION (Actively working towards change); Intervened by reinforcing change plan / affirming steps taken         Plan: (Homework, other):  Patient was encouraged to continue to seek condition-related information and education, as well as schedule a follow up appointment with the Health  in 4 weeks. Patient has set self-identified goals and will monitor progress until the next appointment.  Scheduled our next coaching telephone call for Friday June 8th at 10am.      Jacob Dominguez

## 2018-06-11 ENCOUNTER — TELEPHONE (OUTPATIENT)
Dept: NURSING | Facility: CLINIC | Age: 48
End: 2018-06-11

## 2018-06-11 NOTE — TELEPHONE ENCOUNTER
Outreached patient to follow-up on cancellation of previously scheduled appointment, but wasn't able to connect. Left message for call back to reschedule.      Jacob Dominguez, Health    6/11/2018    3:58 PM

## 2018-06-19 ENCOUNTER — TELEPHONE (OUTPATIENT)
Dept: NURSING | Facility: CLINIC | Age: 48
End: 2018-06-19

## 2018-06-19 NOTE — TELEPHONE ENCOUNTER
Outreached patient for second time after cancellation of last appointment, but wasn't able to connect. Left message for call back.    Jacob Dominguez, Health    6/19/2018    4:24 PM

## 2018-06-20 DIAGNOSIS — E66.01 MORBID OBESITY DUE TO EXCESS CALORIES (H): ICD-10-CM

## 2018-06-20 RX ORDER — PHENTERMINE HYDROCHLORIDE 37.5 MG/1
37.5 TABLET ORAL
Qty: 32 TABLET | Refills: 2 | Status: CANCELLED | OUTPATIENT
Start: 2018-06-20

## 2018-06-22 NOTE — TELEPHONE ENCOUNTER
Left detailed message informing phentermine refill denied, needs endo appointment, call for appointment, ask for nurse if ?'s  Kristen Vargas RN, BSN  Message handled by Nurse Triage.

## 2018-06-22 NOTE — TELEPHONE ENCOUNTER
Please call - I haven't seen her since 12/2017 so she would need to schedule a follow up visit with me as phentermine is controlled and requires regular follow up.  I don't see any upcoming appointments with me scheduled, only Dr. Cameron on 7/2 (I don't know if Dr. Cameron is willing to continue prescribing the phentermine for her - which is fine if she wants to - otherwise patient needs to schedule with me)  Thanks,  Janet Castaneda NP  Endocrinology

## 2018-06-22 NOTE — TELEPHONE ENCOUNTER
Last Written Prescription Date:  12.6.17  Last Fill Quantity: 32,  # refills: 2   Last office visit: 4/6/2018 with prescribing provider:  Nisha Ojeda Office Visit:      Requested Prescriptions   Pending Prescriptions Disp Refills     phentermine (ADIPEX-P) 37.5 MG tablet 32 tablet 2     Sig: Take 1 tablet (37.5 mg) by mouth every morning (before breakfast)    There is no refill protocol information for this order        Routing refill request to provider for review/approval because:  Drug not on the [facility] refill protocol     Temitope Diego RN, BS  Clinical Nurse Triage.

## 2018-07-03 ENCOUNTER — OFFICE VISIT (OUTPATIENT)
Dept: FAMILY MEDICINE | Facility: CLINIC | Age: 48
End: 2018-07-03
Payer: OTHER MISCELLANEOUS

## 2018-07-03 VITALS
TEMPERATURE: 98 F | SYSTOLIC BLOOD PRESSURE: 133 MMHG | HEART RATE: 67 BPM | WEIGHT: 288.9 LBS | BODY MASS INDEX: 45.25 KG/M2 | DIASTOLIC BLOOD PRESSURE: 86 MMHG

## 2018-07-03 DIAGNOSIS — F43.23 ADJUSTMENT DISORDER WITH MIXED ANXIETY AND DEPRESSED MOOD: ICD-10-CM

## 2018-07-03 DIAGNOSIS — Z02.6 ENCOUNTER RELATED TO WORKER'S COMPENSATION CLAIM: Primary | ICD-10-CM

## 2018-07-03 DIAGNOSIS — F43.10 PTSD (POST-TRAUMATIC STRESS DISORDER): ICD-10-CM

## 2018-07-03 PROCEDURE — 99213 OFFICE O/P EST LOW 20 MIN: CPT | Performed by: FAMILY MEDICINE

## 2018-07-03 NOTE — LETTER
[address] [address]  Phone: [phone]    July 3, 2018        Keerthi Sloan  [address] [place] [id number]          To whom it may concern:    RE: Keerthi Sloan    Patient was seen and treated today at our clinic today.    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      Please contact me for questions or concerns.      Sincerely,        Elida Cameron, DO

## 2018-07-03 NOTE — MR AVS SNAPSHOT
After Visit Summary   7/3/2018    Keerthi Sloan    MRN: [id number]           Patient Information     Date Of Birth          1970        Visit Information        Provider Department      7/3/2018 9:40 AM Elida Cameron DO [facility]        Today's Diagnoses     Encounter related to worker's compensation claim    -  1    PTSD (post-traumatic stress disorder)        Adjustment disorder with mixed anxiety and depressed mood           Follow-ups after your visit        Follow-up notes from your care team     Return in about 4 weeks (around 7/31/2018).      Your next 10 appointments already scheduled     Aug 08, 2018  9:40 AM CDT   SHORT with Elida Cameron DO   [facility]    [address] [place] [address] [phone]              Who to contact     If you have questions or need follow up information about today's clinic visit or your schedule please contact [facility] directly at [phone].  Normal or non-critical lab and imaging results will be communicated to you by [facility], letter or phone within 4 business days after the clinic has received the results. If you do not hear from us within 7 days, please contact the clinic through [employer] or phone. If you have a critical or abnormal lab result, we will notify you by phone as soon as possible.  Submit refill requests through [employer] or call your pharmacy and they will forward the refill request to us. Please allow 3 business days for your refill to be completed.          Additional Information About Your Visit        [facility] Information     [employer] gives you secure access to your electronic health record. If you see a primary care provider, you can also send messages to your care team and make appointments. If you have questions, please call your primary care clinic.  If you do not have a primary care provider, please  call [phone] and they will assist you.        Care EveryWhere ID     This is your Care EveryWhere ID. This could be used by other organizations to access your [facility] medical records  [id number]        Your Vitals Were     Pulse Temperature Last Period Breastfeeding? BMI (Body Mass Index)       67 98  F (36.7  C) (Oral) 06/16/2018 No 45.25 kg/m2        Blood Pressure from Last 3 Encounters:   07/03/18 133/86   04/06/18 130/74   03/07/18 102/80    Weight from Last 3 Encounters:   07/03/18 288 lb 14.4 oz (131 kg)   04/06/18 280 lb (127 kg)   03/07/18 275 lb (124.7 kg)              Today, you had the following     No orders found for display         Today's Medication Changes          These changes are accurate as of 7/3/18 11:55 AM.  If you have any questions, ask your nurse or doctor.               These medicines have changed or have updated prescriptions.        Dose/Directions    sertraline 50 MG tablet   Commonly known as:  ZOLOFT   This may have changed:  additional instructions   Used for:  PTSD (post-traumatic stress disorder), Adjustment disorder with mixed anxiety and depressed mood   Changed by:  Elida Cameron DO        Dose:  50 mg   Take 1 tablet (50 mg) by mouth daily   Quantity:  90 tablet   Refills:  1            Where to get your medicines      These medications were sent to [facility] [address]  [address] [place] [address]     Phone:  [phone]     sertraline 50 MG tablet                Primary Care Provider Office Phone # Fax #    Elida Cameron [phone] [address] [facility] [address]        Equal Access to Services     [facility] campus[facility]I : Hadii edis warren Soshreyas, waaxda luqadaha, qaybta kaalmada adeegyada, holli garcia. So [place] -[phone].    ATENCIÓN: Si habla español, tiene a riggs disposición servicios gratuitos de asistencia lingüística. Llame al  [phone].    We comply with applicable federal civil rights laws and [place] laws. We do not discriminate on the basis of race, color, national origin, age, disability, sex, sexual orientation, or gender identity.            Thank you!     Thank you for choosing [facility]  for your care. Our goal is always to provide you with excellent care. Hearing back from our patients is one way we can continue to improve our services. Please take a few minutes to complete the written survey that you may receive in the mail after your visit with us. Thank you!             Your Updated Medication List - Protect others around you: Learn how to safely use, store and throw away your medicines at www.disposemymeds.org.          This list is accurate as of 7/3/18 11:55 AM.  Always use your most recent med list.                   Brand Name Dispense Instructions for use Diagnosis    Ferrous Sulfate 27 MG Tabs      1 tab QD        metFORMIN 500 MG 24 hr tablet    GLUCOPHAGE-XR    180 tablet    Take 1 tablet (500 mg) by mouth 2 times daily (with meals)    Prediabetes, Vitamin D deficiency       omeprazole 40 MG capsule    priLOSEC          oxybutynin 10 MG 24 hr tablet    DITROPAN-XL     Take 10 mg by mouth        phentermine 37.5 MG tablet    ADIPEX-P    32 tablet    Take 1 tablet (37.5 mg) by mouth every morning (before breakfast)    Morbid obesity due to excess calories (H)       ranitidine 150 MG tablet    ZANTAC    60 tablet    Take 1 tablet (150 mg) by mouth 2 times daily    Gastroesophageal reflux disease, esophagitis presence not specified       sertraline 50 MG tablet    ZOLOFT    90 tablet    Take 1 tablet (50 mg) by mouth daily    PTSD (post-traumatic stress disorder), Adjustment disorder with mixed anxiety and depressed mood       topiramate 50 MG tablet    TOPAMAX    180 tablet    Take 1 tablet (50 mg) by mouth 2 times daily    Vitamin D deficiency, Prediabetes, Morbid obesity due to excess calories  (H)       traZODone 150 MG tablet    DESYREL    90 tablet    Take 1 tablet (150 mg) by mouth nightly as needed for sleep    PTSD (post-traumatic stress disorder), Adjustment disorder with mixed anxiety and depressed mood, Adjustment insomnia       vitamin D 36458 UNIT capsule    ERGOCALCIFEROL    12 capsule    TAKE 1 CAPSULE BY MOUTH EVERY 7 DAYS    Vitamin D deficiency
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
## 2018-07-27 ENCOUNTER — TELEPHONE (OUTPATIENT)
Dept: NURSING | Facility: CLINIC | Age: 48
End: 2018-07-27

## 2018-08-08 ENCOUNTER — OFFICE VISIT (OUTPATIENT)
Dept: FAMILY MEDICINE | Facility: CLINIC | Age: 48
End: 2018-08-08
Payer: OTHER MISCELLANEOUS

## 2018-08-08 ENCOUNTER — OFFICE VISIT (OUTPATIENT)
Dept: ENDOCRINOLOGY | Facility: CLINIC | Age: 48
End: 2018-08-08
Payer: COMMERCIAL

## 2018-08-08 VITALS
SYSTOLIC BLOOD PRESSURE: 110 MMHG | WEIGHT: 293 LBS | HEART RATE: 75 BPM | OXYGEN SATURATION: 97 % | BODY MASS INDEX: 46.52 KG/M2 | RESPIRATION RATE: 16 BRPM | DIASTOLIC BLOOD PRESSURE: 72 MMHG | TEMPERATURE: 98.2 F

## 2018-08-08 VITALS
WEIGHT: 293 LBS | SYSTOLIC BLOOD PRESSURE: 126 MMHG | DIASTOLIC BLOOD PRESSURE: 81 MMHG | BODY MASS INDEX: 46.5 KG/M2 | HEART RATE: 68 BPM | RESPIRATION RATE: 16 BRPM

## 2018-08-08 DIAGNOSIS — R73.03 PREDIABETES: ICD-10-CM

## 2018-08-08 DIAGNOSIS — F43.10 PTSD (POST-TRAUMATIC STRESS DISORDER): ICD-10-CM

## 2018-08-08 DIAGNOSIS — E55.9 VITAMIN D DEFICIENCY: ICD-10-CM

## 2018-08-08 DIAGNOSIS — E66.01 MORBID OBESITY DUE TO EXCESS CALORIES (H): ICD-10-CM

## 2018-08-08 DIAGNOSIS — Z02.6 ENCOUNTER RELATED TO WORKER'S COMPENSATION CLAIM: Primary | ICD-10-CM

## 2018-08-08 DIAGNOSIS — F43.22 ADJUSTMENT DISORDER WITH ANXIOUS MOOD: ICD-10-CM

## 2018-08-08 DIAGNOSIS — E66.01 MORBID OBESITY (H): Primary | ICD-10-CM

## 2018-08-08 DIAGNOSIS — D50.9 IRON DEFICIENCY ANEMIA, UNSPECIFIED IRON DEFICIENCY ANEMIA TYPE: ICD-10-CM

## 2018-08-08 LAB
ERYTHROCYTE [DISTWIDTH] IN BLOOD BY AUTOMATED COUNT: 15.6 % (ref 10–15)
HBA1C MFR BLD: 5.7 % (ref 0–5.6)
HCT VFR BLD AUTO: 38.8 % (ref 35–47)
HGB BLD-MCNC: 11.9 G/DL (ref 11.7–15.7)
MCH RBC QN AUTO: 23.7 PG (ref 26.5–33)
MCHC RBC AUTO-ENTMCNC: 30.7 G/DL (ref 31.5–36.5)
MCV RBC AUTO: 77 FL (ref 78–100)
PLATELET # BLD AUTO: 222 10E9/L (ref 150–450)
RBC # BLD AUTO: 5.02 10E12/L (ref 3.8–5.2)
WBC # BLD AUTO: 6.5 10E9/L (ref 4–11)

## 2018-08-08 PROCEDURE — 36415 COLL VENOUS BLD VENIPUNCTURE: CPT | Performed by: CLINICAL NURSE SPECIALIST

## 2018-08-08 PROCEDURE — 83516 IMMUNOASSAY NONANTIBODY: CPT | Performed by: CLINICAL NURSE SPECIALIST

## 2018-08-08 PROCEDURE — 82306 VITAMIN D 25 HYDROXY: CPT | Performed by: CLINICAL NURSE SPECIALIST

## 2018-08-08 PROCEDURE — 99214 OFFICE O/P EST MOD 30 MIN: CPT | Performed by: CLINICAL NURSE SPECIALIST

## 2018-08-08 PROCEDURE — 83550 IRON BINDING TEST: CPT | Performed by: CLINICAL NURSE SPECIALIST

## 2018-08-08 PROCEDURE — 99213 OFFICE O/P EST LOW 20 MIN: CPT | Performed by: FAMILY MEDICINE

## 2018-08-08 PROCEDURE — 85027 COMPLETE CBC AUTOMATED: CPT | Performed by: CLINICAL NURSE SPECIALIST

## 2018-08-08 PROCEDURE — 83540 ASSAY OF IRON: CPT | Performed by: CLINICAL NURSE SPECIALIST

## 2018-08-08 PROCEDURE — 83036 HEMOGLOBIN GLYCOSYLATED A1C: CPT | Performed by: CLINICAL NURSE SPECIALIST

## 2018-08-08 PROCEDURE — 83516 IMMUNOASSAY NONANTIBODY: CPT | Mod: 59 | Performed by: CLINICAL NURSE SPECIALIST

## 2018-08-08 PROCEDURE — 82728 ASSAY OF FERRITIN: CPT | Performed by: CLINICAL NURSE SPECIALIST

## 2018-08-08 RX ORDER — PHENTERMINE HYDROCHLORIDE 37.5 MG/1
37.5 TABLET ORAL
Qty: 32 TABLET | Refills: 2 | Status: SHIPPED | OUTPATIENT
Start: 2018-08-08 | End: 2020-06-11

## 2018-08-08 RX ORDER — TOPIRAMATE 50 MG/1
75 TABLET, FILM COATED ORAL 2 TIMES DAILY
Qty: 270 TABLET | Refills: 1 | Status: SHIPPED | OUTPATIENT
Start: 2018-08-08 | End: 2019-08-29

## 2018-08-08 RX ORDER — ERGOCALCIFEROL 1.25 MG/1
CAPSULE, LIQUID FILLED ORAL
Qty: 12 CAPSULE | Refills: 1 | Status: SHIPPED | OUTPATIENT
Start: 2018-08-08 | End: 2018-08-10

## 2018-08-08 RX ORDER — TOPIRAMATE 50 MG/1
50 TABLET, FILM COATED ORAL 2 TIMES DAILY
Qty: 180 TABLET | Refills: 1 | Status: SHIPPED | OUTPATIENT
Start: 2018-08-08 | End: 2018-08-08

## 2018-08-08 ASSESSMENT — PATIENT HEALTH QUESTIONNAIRE - PHQ9
SUM OF ALL RESPONSES TO PHQ QUESTIONS 1-9: 10
10. IF YOU CHECKED OFF ANY PROBLEMS, HOW DIFFICULT HAVE THESE PROBLEMS MADE IT FOR YOU TO DO YOUR WORK, TAKE CARE OF THINGS AT HOME, OR GET ALONG WITH OTHER PEOPLE: SOMEWHAT DIFFICULT
SUM OF ALL RESPONSES TO PHQ QUESTIONS 1-9: 10

## 2018-08-08 ASSESSMENT — ANXIETY QUESTIONNAIRES
6. BECOMING EASILY ANNOYED OR IRRITABLE: MORE THAN HALF THE DAYS
4. TROUBLE RELAXING: MORE THAN HALF THE DAYS
GAD7 TOTAL SCORE: 14
GAD7 TOTAL SCORE: 14
2. NOT BEING ABLE TO STOP OR CONTROL WORRYING: MORE THAN HALF THE DAYS
3. WORRYING TOO MUCH ABOUT DIFFERENT THINGS: MORE THAN HALF THE DAYS
1. FEELING NERVOUS, ANXIOUS, OR ON EDGE: MORE THAN HALF THE DAYS
5. BEING SO RESTLESS THAT IT IS HARD TO SIT STILL: MORE THAN HALF THE DAYS
GAD7 TOTAL SCORE: 14
7. FEELING AFRAID AS IF SOMETHING AWFUL MIGHT HAPPEN: MORE THAN HALF THE DAYS
7. FEELING AFRAID AS IF SOMETHING AWFUL MIGHT HAPPEN: MORE THAN HALF THE DAYS

## 2018-08-08 NOTE — LETTER
[facility]  [address] [place] [address]  Phone: [phone]    August 8, 2018        Keerthi Sloan  [address] [place] [address]          To whom it may concern:     RE: Keerthi Sloan     Patient was seen and treated today at our clinic today.     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  I agree with aptitude testing to see if there is another position in the company that would better suit her.         Please contact me for questions or concerns.        Sincerely,           Elida Cameron, DO

## 2018-08-08 NOTE — LETTER
8/8/2018         RE: Keerthi Sloan  [address]  [facility] [address]        Dear Colleague,    Thank you for referring your patient, Keerthi Sloan, to the [facility]. Please see a copy of my visit note below.    Name: Keerthi Sloan  F/u for obesity (Last seen 12/6/2017).  HPI:  Keerthi Sloan is a 48 year old female who presents for the f/u of prediabetes, vitamin D deficiency, sleep apnea on CPAP, and obesity.  She started gaining weight in her 30's.  Weight stabilized in the low 300's the past 5 years and she was unable to lose any weight with diet efforts.  She considered gastric bypass and was [place] [facility] [place] of doing the procedure but she didn't feel comfortable doing the bypass so quit going to the clinic.      She was briefly treated with Phentermine 5/2016.  She discontinued this on her own after 1-2 months.  She has been able to lose 51 lbs with diet efforts, 305-->288-->254 lbs, since 12/2015,then weight increased to 280 lbs. Previously working with [facility], the health .    + prediabetes - treated with Metformin 500 mg bid.  A1c improved 6.1--->5.5%.   Off Phentermine since knee surgery 10/2016. Briefly resumed 1/2017 - off since 4/2017.  Has continued Topamax 50 mg bi.   Resumed phentermine 37.5 mg every day 12/2017.  +iron deficiency - thought related to heavy menses, started oral iron 6/2016.  +vitamin D deficiency - D level 15, started vitamin D 50,000 IU weekly.  D improved to 21-22.  Has continued D 50K/week.  Sleep Apnea/Snores:Yes: diagnosed with sleep apnea, uses CPAP  Hyperlipidemia:Yes: elevated triglycerides  Hirsutism:Yes: increased facial hair growth (chin)  Family history of Obesity:Yes: Mother had gastric bypass  Diet: Struggled more with diet, mother moved in with her and wanted to do the cooking - she finally talked to her mom about the way she prefers to eat.  Previously: Eating whole, unprocessed  foods, increased veggies, portion control.  Trying to eat more regularly throughout the day - usual eating pattern is to skip breakfast and lunch then overeat at dinner because she is so hungry by that point).  Has been trying to pack food for breakfast and lunch while at work.   Exercise: yes, [employer]    2.  Other: elevated 11pm salivary cortisol.  1 mg Dexamethasone suppression test was normal.      PMH/PSH:  History reviewed. No pertinent past medical history.  Past Surgical History:   Procedure Laterality Date     GYN SURGERY       HERNIA REPAIR       TUBAL LIGATION  1997    LBTL     Family Hx:  Family History   Problem Relation Age of Onset     Diabetes Father      Hypertension Father      KIDNEY DISEASE Father      Diabetes Brother      Hypertension Brother      Thyroid disease: No         DM2: Yes: father         Autoimmune: DM1, SLE, RA, Vitiligo No    Social Hx:  Social History     Social History     Marital status:      Spouse name: N/A     Number of children: 3     Years of education: N/A     Occupational History     [employer]     Social History Main Topics     Smoking status: Never Smoker     Smokeless tobacco: Never Used     Alcohol use No     Drug use: No     Sexual activity: Not Currently     Other Topics Concern     Not on file     Social History Narrative          MEDICATIONS:  has a current medication list which includes the following prescription(s): cholecalciferol, ferrous sulfate, metformin, omeprazole, oxybutynin, phentermine, ranitidine, sertraline, topiramate, and trazodone.    ROS     GENERAL: + weight gain, history of continued weight gain, no fevers, + fatigue, + night sweats and hot flashes.   HEENT: no dysphagia, odynophagia, diplopia, neck pain or tenderness  CV: no chest pain, pressure, palpitations, skipped beats, LOC  LUNGS: no SOB, STEELE, cough, sputum production, wheezing   ABDOMEN: no diarrhea, constipation, abdominal pain  EXTREMITIES: no rashes,  ulcers, edema  NEUROLOGY: no changes in vision, tingling or numbness in hands or feet.   MSK: + muscle aches, + joint pain, no weakness  SKIN: no rashes or lesions  ENDOCRINE: no heat or cold intolerance    Physical Exam   VS: /81 (BP Location: Left arm, Patient Position: Chair, Cuff Size: Adult Large)  Pulse 68  Resp 16  Wt 134.7 kg (296 lb 14.4 oz)  LMP 07/10/2018 (Exact Date)  Breastfeeding? No  BMI 46.5 kg/m2  GENERAL: NAD, well dressed, answering questions appropriately, appears stated age.  HEENT: no exopthalmous, no proptosis, no lig lag, no retraction, no scleral icterus  RESPIRATORY: Clear. Normal respiratory effort.  CARDIOVASCULAR: RRR.  No peripheral edema.  EXTREMITIES: no edema  NEUROLOGY: CN grossly intact, no tremors  MSK: grossly intact, Normal gait and station.  SKIN: no rashes, no lesions, no ulcers  PSYCH: Intact judgment and insight. A&OX3 with a cordial affect.    LABS:  !COMPREHENSIVE Latest Ref Rng & Units 1/23/2017   SODIUM 133 - 144 mmol/L 141   POTASSIUM 3.4 - 5.3 mmol/L 4.2   CHLORIDE 94 - 109 mmol/L 106   BUN 7 - 30 mg/dL 12   Creatinine 0.52 - 1.04 mg/dL 0.66   Glucose 70 - 99 mg/dL 89   ANION GAP 3 - 14 mmol/L 9   CALCIUM 8.5 - 10.1 mg/dL 9.1   ALBUMIN 3.4 - 5.0 g/dL 3.8     Component      Latest Ref Rng & Units 9/8/2016 1/23/2017 12/6/2017   Hemoglobin A1C      4.3 - 6.0 % 5.5 5.7 5.7     Component    Latest Ref Rng 5/26/2016   C-Peptide    0.9 - 6.9 ng/mL 1.9   Insulin     9     Component    Latest Ref Rng 5/26/2016   WBC    4.0 - 11.0 10e9/L 6.0   RBC Count    3.8 - 5.2 10e12/L 5.08   Hemoglobin    11.7 - 15.7 g/dL 10.9 (L)   Hematocrit    35.0 - 47.0 % 35.7   MCV    78 - 100 fl 70 (L)   MCH    26.5 - 33.0 pg 21.5 (L)   MCHC    31.5 - 36.5 g/dL 30.5 (L)   RDW    10.0 - 15.0 % 17.7 (H)   Platelet Count    150 - 450 10e9/L 225     Component    Latest Ref Rn 5/26/2016   Iron    35 - 180 ug/dL 27 (L)   Iron Binding Cap    240 - 430 ug/dL 378   Iron Saturation Index    15 -  "46 % 7 (L)   Ferritin    12 - 150 ng/mL 3 (L)     Component    Latest Ref Rng 5/26/2016   Saliva Collection Time     2300   Cortisol Saliva     0.129     1 mg Dexamethasone suppression test:  Component    Latest Ref Rng 6/17/2016   Cortisol Serum    4 - 22 ug/dL 0.8 (L)     Component    Latest Ref Rng 5/26/2016   Testosterone Total    8 - 60 ng/dL 4 (L)   Sex Hormone Binding Globulin    30 - 135 nmol/L 34   Free Testosterone Calculated    0.11 - 0.58 ng/dL 0.07 (L)   Estradiol     26   FSH     5.8   Lutropin     2.7     Component      Latest Ref Rng & Units 9/8/2016 1/23/2017   Vitamin D Deficiency screening      20 - 75 ug/L 22 21     Vital Signs 5/17/2017 12/6/2017   Weight (LB) 243 lb 6 oz 280 lb 11.2 oz   Height     BMI (Calculated)  43.96     Vital Signs 1/5/2018 1/12/2018 2/2/2018 2/15/2018 3/7/2018   Weight (LB) 281 lb 279 lb 3.2 oz 275 lb 278 lb 275 lb   Height  5' 7\" 5' 7\" 5' 7\"    BMI (Calculated)  43.82 43.16 43.63      Vital Signs 4/6/2018 7/3/2018 8/8/2018 8/8/2018   Weight (LB) 280 lb 288 lb 14.4 oz 297 lb 296 lb 14.4 oz   Height 5' 7\"      BMI (Calculated) 43.95          All pertinent notes, labs, and images personally reviewed by me.     A/P  Ms.Kimberly CAROLYN Slona is a 48 year old here for the evaluation of obesity:    1. IFG/prediabetes.  Fasting glucose elevated x 2, most recently in 2014.  + FH of type 2 diabetes.  No personal history of GD (3 pregnancies).  A1c elevated at 6.1%, improved to 5.5%.  Started Metformin  mg bid 6/2016, currently treated with Metformin 500 mg bid.   Continue current dose.  Plan to recheck A1c today and if increased > 5.7% will consider increasing Metformin to 2000 mg/day.    2. Obesity-  Current BMI 43-44.  Her comorbidities include IFG/prediabetes and MARÍA.  Obesity is associated with a significant increase in mortality and risk of many disorders, including diabetes mellitus, hypertension, dyslipidemia, heart disease, stroke, sleep apnea, cancer, and many " others. Conversely, weight loss is associated with a reduction in obesity-associated morbidity.    Endocrine evaluation of obesity includes Diabetes/prediabetes, Cushing's and thyroid dysfunction.  The relevant work up for the diagnosis and management of obesity and various treatment options were discussed. Body Mass Index (BMI) has been a standard means for calculating risk for overweight and obesity. The new American Association of Clinical Endocrinology (AACE) algorithm recommends lifestyle modifications as the initial phase of treatment for all patients with the BMI equal or greater than 25 kg/m2. Lifestyle modifications includes use of medical nutrition therapy, exercise, tobacco cessation, adequate quality and quantity of sleep, limited consumption of alcohol and reduced stress through implementation of a structured, multidisciplinary program.    In patients with complications associated with obesity, graded interventions are recommended including pharmacological therapy such as phentermine, orlistat, lorcaserin and phentermine/topiramate ER, contrave ( buproprion/naltreone) and the use of very low calorie meal replacement programs.    If medical intervention is insufficient, surgical therapy may be considered, especially in patients with BMI greater than or equal to 35 kg/m2 with multiple complications. Surgical treatments include lap-band, gastric sleeve or gastric bypass surgery.    She is considering surgical weight loss options.  Interested in transoral gastroplasty - procedure is done at [facility].  Counseling exercise ( V65.41)  Dietary counseling( V65.3)  Calculated BMI above the upper parameter and f/u plan was documented in the medical record.  Discussed indications, risks and benefits of all medications prescribed, and answered questions to patient's satisfaction.  Restart phentermine 37.5 mg qd.  Increase Topamax to 75 mg bid.    3. Vitamin D deficiency.  Vitamin D level low at 15, improved to 21.   Started Vitamin D 50,000 IU weekly 6/2016.  Plan to recheck D level today.    Labs ordered today:   Orders Placed This Encounter   Procedures     CBC with platelets     Iron and iron binding capacity     Ferritin     Hemoglobin A1c     Vitamin D Deficiency     Tissue transglutaminase keke IgA and IgG     HEALTH  REFERRAL     Radiology/Consults ordered today: HEALTH  REFERRAL    More than 50% of the time spent with Ms. Sloan on counseling / coordinating her care.  Total face to face time was greater than or equal to 25 minutes.      Follow-up:  3 months    Janet Castaneda NP  Endocrinology  [facility]  CC:   ____________________________________________________________        Veronica,  Your D level is still low.  We can try changing your D replacement from D2 to D3, sometimes that helps.  I'll send a new Rx to your pharmacy.  You can use up your current supply of D2 before changing.  Janet Castaneda NP  Endocrinology    Veronica,  The screening test for celiac disease was negative.  Let me know if you have any questions.  Janet Castaneda NP  Endocrinology     Again, thank you for allowing me to participate in the care of your patient.        Sincerely,        MADELEINE Vasquez CNP

## 2018-08-08 NOTE — MR AVS SNAPSHOT
After Visit Summary   8/8/2018    Keerthi Sloan    MRN: [id number]           Patient Information     Date Of Birth          1970        Visit Information        Provider Department      8/8/2018 9:40 AM Elida Cameron DO [facility]        Today's Diagnoses     Encounter related to worker's compensation claim    -  1    PTSD (post-traumatic stress disorder)        Adjustment disorder with anxious mood           Follow-ups after your visit        Follow-up notes from your care team     Return in about 4 weeks (around 9/5/2018).      Your next 10 appointments already scheduled     Aug 08, 2018 12:30 PM CDT   MyChart Endocrine Return with MADELEINE Vasquez CNP   [facility] ([facility]    [address] [place] [id number] [phone]            Sep 25, 2018  9:40 AM CDT   SHORT with Elida Cameron DO   [facility]    [address] [place] [id number] [phone]              Who to contact     If you have questions or need follow up information about today's clinic visit or your schedule please contact [facility] directly at [phone].  Normal or non-critical lab and imaging results will be communicated to you by [employer] Media[employer], letter or phone within 4 business days after the clinic has received the results. If you do not hear from us within 7 days, please contact the clinic through [employer] or phone. If you have a critical or abnormal lab result, we will notify you by phone as soon as possible.  Submit refill requests through [employer]onoff or call your pharmacy and they will forward the refill request to us. Please allow 3 business days for your refill to be completed.          Additional Information About Your Visit        Capture Media[employer] Information     Zonoff gives you secure access to your electronic health record. If you see a  primary care provider, you can also send messages to your care team and make appointments. If you have questions, please call your primary care clinic.  If you do not have a primary care provider, please call [phone] and they will assist you.        Care EveryWhere ID     This is your Care EveryWhere ID. This could be used by other organizations to access your [place] medical records  [id number]        Your Vitals Were     Pulse Temperature Respirations Pulse Oximetry BMI (Body Mass Index)       75 98.2  F (36.8  C) (Oral) 16 97% 46.52 kg/m2        Blood Pressure from Last 3 Encounters:   08/08/18 110/72   07/03/18 133/86   04/06/18 130/74    Weight from Last 3 Encounters:   08/08/18 297 lb (134.7 kg)   07/03/18 288 lb 14.4 oz (131 kg)   04/06/18 280 lb (127 kg)              Today, you had the following     No orders found for display       Primary Care Provider Office Phone # Fax #    Elida Mackey DO [facility] [phone] [address] [facility] [address]        Equal Access to Services     NICOLETTE LEMON : Hadii aad ku hadasho Soomaali, waaxda luqadaha, qaybta kaalmada adeegyada, holli wright . So [facility] [phone].    ATENCIÓN: Si habla español, tiene a riggs disposición servicios gratuitos de asistencia lingüística. Llame al [phone].    We comply with applicable federal civil rights laws and [place] laws. We do not discriminate on the basis of race, color, national origin, age, disability, sex, sexual orientation, or gender identity.            Thank you!     Thank you for choosing [facility]  for your care. Our goal is always to provide you with excellent care. Hearing back from our patients is one way we can continue to improve our services. Please take a few minutes to complete the written survey that you may receive in the mail after your visit with us. Thank you!             Your Updated Medication List - Protect others around  you: Learn how to safely use, store and throw away your medicines at www.disposemymeds.org.          This list is accurate as of 8/8/18 10:32 AM.  Always use your most recent med list.                   Brand Name Dispense Instructions for use Diagnosis    Ferrous Sulfate 27 MG Tabs      1 tab QD        metFORMIN 500 MG 24 hr tablet    GLUCOPHAGE-XR    180 tablet    Take 1 tablet (500 mg) by mouth 2 times daily (with meals)    Prediabetes, Vitamin D deficiency       omeprazole 40 MG capsule    priLOSEC          oxybutynin 10 MG 24 hr tablet    DITROPAN-XL     Take 10 mg by mouth        phentermine 37.5 MG tablet    ADIPEX-P    32 tablet    Take 1 tablet (37.5 mg) by mouth every morning (before breakfast)    Morbid obesity due to excess calories (H)       ranitidine 150 MG tablet    ZANTAC    60 tablet    Take 1 tablet (150 mg) by mouth 2 times daily    Gastroesophageal reflux disease, esophagitis presence not specified       sertraline 50 MG tablet    ZOLOFT    90 tablet    Take 1 tablet (50 mg) by mouth daily    PTSD (post-traumatic stress disorder), Adjustment disorder with mixed anxiety and depressed mood       topiramate 50 MG tablet    TOPAMAX    180 tablet    Take 1 tablet (50 mg) by mouth 2 times daily    Vitamin D deficiency, Prediabetes, Morbid obesity due to excess calories (H)       traZODone 150 MG tablet    DESYREL    90 tablet    Take 1 tablet (150 mg) by mouth nightly as needed for sleep    PTSD (post-traumatic stress disorder), Adjustment disorder with mixed anxiety and depressed mood, Adjustment insomnia       vitamin D 90777 UNIT capsule    ERGOCALCIFEROL    12 capsule    TAKE 1 CAPSULE BY MOUTH EVERY 7 DAYS    Vitamin D deficiency

## 2018-08-08 NOTE — PROGRESS NOTES
Name: Keerthi Sloan  F/u for obesity (Last seen 12/6/2017).  HPI:  Keerthi Sloan is a 48 year old female who presents for the f/u of prediabetes, vitamin D deficiency, sleep apnea on CPAP, and obesity.  She started gaining weight in her 30's.  Weight stabilized in the low 300's the past 5 years and she was unable to lose any weight with diet efforts.  She considered gastric bypass and was being seen [facility] in anticipation of doing the procedure but she didn't feel comfortable doing the bypass so quit going to the clinic.      She was briefly treated with Phentermine 5/2016.  She discontinued this on her own after 1-2 months.  She has been able to lose 51 lbs with diet efforts, 305-->288-->254 lbs, since 12/2015,then weight increased to 280 lbs. Previously working with [facility], the health .    + prediabetes - treated with Metformin 500 mg bid.  A1c improved 6.1--->5.5%.   Off Phentermine since knee surgery 10/2016. Briefly resumed 1/2017 - off since 4/2017.  Has continued Topamax 50 mg bi.   Resumed phentermine 37.5 mg every day 12/2017.  +iron deficiency - thought related to heavy menses, started oral iron 6/2016.  +vitamin D deficiency - D level 15, started vitamin D 50,000 IU weekly.  D improved to 21-22.  Has continued D 50K/week.  Sleep Apnea/Snores:Yes: diagnosed with sleep apnea, uses CPAP  Hyperlipidemia:Yes: elevated triglycerides  Hirsutism:Yes: increased facial hair growth (chin)  Family history of Obesity:Yes: Mother had gastric bypass  Diet: Struggled more with diet, mother moved in with her and wanted to do the cooking - she finally talked to her mom about the way she prefers to eat.  Previously: Eating whole, unprocessed foods, increased veggies, portion control.  Trying to eat more regularly throughout the day - usual eating pattern is to skip breakfast and lunch then overeat at dinner because she is so hungry by that point).  Has been trying to pack food for breakfast and lunch  while at work.   Exercise: yes, [employer]    2.  Other: elevated 11pm salivary cortisol.  1 mg Dexamethasone suppression test was normal.      PMH/PSH:  History reviewed. No pertinent past medical history.  Past Surgical History:   Procedure Laterality Date     GYN SURGERY       HERNIA REPAIR       TUBAL LIGATION  1997    LBTL     Family Hx:  Family History   Problem Relation Age of Onset     Diabetes Father      Hypertension Father      KIDNEY DISEASE Father      Diabetes Brother      Hypertension Brother      Thyroid disease: No         DM2: Yes: father         Autoimmune: DM1, SLE, RA, Vitiligo No    Social Hx:  Social History     Social History     Marital status:      Spouse name: N/A     Number of children: 3     Years of education: N/A     Occupational History     [employer]     Social History Main Topics     Smoking status: Never Smoker     Smokeless tobacco: Never Used     Alcohol use No     Drug use: No     Sexual activity: Not Currently     Other Topics Concern     Not on file     Social History Narrative          MEDICATIONS:  has a current medication list which includes the following prescription(s): cholecalciferol, ferrous sulfate, metformin, omeprazole, oxybutynin, phentermine, ranitidine, sertraline, topiramate, and trazodone.    ROS     GENERAL: + weight gain, history of continued weight gain, no fevers, + fatigue, + night sweats and hot flashes.   HEENT: no dysphagia, odynophagia, diplopia, neck pain or tenderness  CV: no chest pain, pressure, palpitations, skipped beats, LOC  LUNGS: no SOB, STEELE, cough, sputum production, wheezing   ABDOMEN: no diarrhea, constipation, abdominal pain  EXTREMITIES: no rashes, ulcers, edema  NEUROLOGY: no changes in vision, tingling or numbness in hands or feet.   MSK: + muscle aches, + joint pain, no weakness  SKIN: no rashes or lesions  ENDOCRINE: no heat or cold intolerance    Physical Exam   VS: /81 (BP Location: Left arm,  Patient Position: Chair, Cuff Size: Adult Large)  Pulse 68  Resp 16  Wt 134.7 kg (296 lb 14.4 oz)  LMP 07/10/2018 (Exact Date)  Breastfeeding? No  BMI 46.5 kg/m2  GENERAL: NAD, well dressed, answering questions appropriately, appears stated age.  HEENT: no exopthalmous, no proptosis, no lig lag, no retraction, no scleral icterus  RESPIRATORY: Clear. Normal respiratory effort.  CARDIOVASCULAR: RRR.  No peripheral edema.  EXTREMITIES: no edema  NEUROLOGY: CN grossly intact, no tremors  MSK: grossly intact, Normal gait and station.  SKIN: no rashes, no lesions, no ulcers  PSYCH: Intact judgment and insight. A&OX3 with a cordial affect.    LABS:  !COMPREHENSIVE Latest Ref Rng & Units 1/23/2017   SODIUM 133 - 144 mmol/L 141   POTASSIUM 3.4 - 5.3 mmol/L 4.2   CHLORIDE 94 - 109 mmol/L 106   BUN 7 - 30 mg/dL 12   Creatinine 0.52 - 1.04 mg/dL 0.66   Glucose 70 - 99 mg/dL 89   ANION GAP 3 - 14 mmol/L 9   CALCIUM 8.5 - 10.1 mg/dL 9.1   ALBUMIN 3.4 - 5.0 g/dL 3.8     Component      Latest Ref Rng & Units 9/8/2016 1/23/2017 12/6/2017   Hemoglobin A1C      4.3 - 6.0 % 5.5 5.7 5.7     Component    Latest Ref Rng 5/26/2016   C-Peptide    0.9 - 6.9 ng/mL 1.9   Insulin     9     Component    Latest Ref Rng 5/26/2016   WBC    4.0 - 11.0 10e9/L 6.0   RBC Count    3.8 - 5.2 10e12/L 5.08   Hemoglobin    11.7 - 15.7 g/dL 10.9 (L)   Hematocrit    35.0 - 47.0 % 35.7   MCV    78 - 100 fl 70 (L)   MCH    26.5 - 33.0 pg 21.5 (L)   MCHC    31.5 - 36.5 g/dL 30.5 (L)   RDW    10.0 - 15.0 % 17.7 (H)   Platelet Count    150 - 450 10e9/L 225     Component    Latest Ref Rng 5/26/2016   Iron    35 - 180 ug/dL 27 (L)   Iron Binding Cap    240 - 430 ug/dL 378   Iron Saturation Index    15 - 46 % 7 (L)   Ferritin    12 - 150 ng/mL 3 (L)     Component    Latest Ref Rng 5/26/2016   Saliva Collection Time     2300   Cortisol Saliva     0.129     1 mg Dexamethasone suppression test:  Component    Latest Ref Rng 6/17/2016   Cortisol Serum    4 - 22  "ug/dL 0.8 (L)     Component    Latest Ref Rng 5/26/2016   Testosterone Total    8 - 60 ng/dL 4 (L)   Sex Hormone Binding Globulin    30 - 135 nmol/L 34   Free Testosterone Calculated    0.11 - 0.58 ng/dL 0.07 (L)   Estradiol     26   FSH     5.8   Lutropin     2.7     Component      Latest Ref Rng & Units 9/8/2016 1/23/2017   Vitamin D Deficiency screening      20 - 75 ug/L 22 21     Vital Signs 5/17/2017 12/6/2017   Weight (LB) 243 lb 6 oz 280 lb 11.2 oz   Height     BMI (Calculated)  43.96     Vital Signs 1/5/2018 1/12/2018 2/2/2018 2/15/2018 3/7/2018   Weight (LB) 281 lb 279 lb 3.2 oz 275 lb 278 lb 275 lb   Height  5' 7\" 5' 7\" 5' 7\"    BMI (Calculated)  43.82 43.16 43.63      Vital Signs 4/6/2018 7/3/2018 8/8/2018 8/8/2018   Weight (LB) 280 lb 288 lb 14.4 oz 297 lb 296 lb 14.4 oz   Height 5' 7\"      BMI (Calculated) 43.95          All pertinent notes, labs, and images personally reviewed by me.     A/P  Ms.Kimberly CAROLYN Sloan is a 48 year old here for the evaluation of obesity:    1. IFG/prediabetes.  Fasting glucose elevated x 2, most recently in 2014.  + FH of type 2 diabetes.  No personal history of GD (3 pregnancies).  A1c elevated at 6.1%, improved to 5.5%.  Started Metformin  mg bid 6/2016, currently treated with Metformin 500 mg bid.   Continue current dose.  Plan to recheck A1c today and if increased > 5.7% will consider increasing Metformin to 2000 mg/day.    2. Obesity-  Current BMI 43-44.  Her comorbidities include IFG/prediabetes and MARÍA.  Obesity is associated with a significant increase in mortality and risk of many disorders, including diabetes mellitus, hypertension, dyslipidemia, heart disease, stroke, sleep apnea, cancer, and many others. Conversely, weight loss is associated with a reduction in obesity-associated morbidity.    Endocrine evaluation of obesity includes Diabetes/prediabetes, Cushing's and thyroid dysfunction.  The relevant work up for the diagnosis and management of obesity " and various treatment options were discussed. Body Mass Index (BMI) has been a standard means for calculating risk for overweight and obesity. The new American Association of Clinical Endocrinology (AACE) algorithm recommends lifestyle modifications as the initial phase of treatment for all patients with the BMI equal or greater than 25 kg/m2. Lifestyle modifications includes use of medical nutrition therapy, exercise, tobacco cessation, adequate quality and quantity of sleep, limited consumption of alcohol and reduced stress through implementation of a structured, multidisciplinary program.    In patients with complications associated with obesity, graded interventions are recommended including pharmacological therapy such as phentermine, orlistat, lorcaserin and phentermine/topiramate ER, contrave ( buproprion/naltreone) and the use of very low calorie meal replacement programs.    If medical intervention is insufficient, surgical therapy may be considered, especially in patients with BMI greater than or equal to 35 kg/m2 with multiple complications. Surgical treatments include lap-band, gastric sleeve or gastric bypass surgery.    She is considering surgical weight loss options.  Interested in transoral gastroplasty - procedure is done at [facility].  Counseling exercise ( V65.41)  Dietary counseling( V65.3)  Calculated BMI above the upper parameter and f/u plan was documented in the medical record.  Discussed indications, risks and benefits of all medications prescribed, and answered questions to patient's satisfaction.  Restart phentermine 37.5 mg qd.  Increase Topamax to 75 mg bid.    3. Vitamin D deficiency.  Vitamin D level low at 15, improved to 21.  Started Vitamin D 50,000 IU weekly 6/2016.  Plan to recheck D level today.    Labs ordered today:   Orders Placed This Encounter   Procedures     CBC with platelets     Iron and iron binding capacity     Ferritin     Hemoglobin A1c     Vitamin D Deficiency     Tissue  transglutaminase keke IgA and IgG     HEALTH  REFERRAL     Radiology/Consults ordered today: HEALTH  REFERRAL    More than 50% of the time spent with Ms. Sloan on counseling / coordinating her care.  Total face to face time was greater than or equal to 25 minutes.      Follow-up:  3 months    Janet Castaneda NP  Endocrinology  [facility]  CC:   ____________________________________________________________
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
## 2018-08-08 NOTE — MR AVS SNAPSHOT
After Visit Summary   8/8/2018    Keerthi Sloan    MRN: [id number]           Patient Information     Date Of Birth          1970        Visit Information        Provider Department      8/8/2018 12:30 PM Janet Castaneda APRN CNP [facility]        Today's Diagnoses     Morbid obesity (H)    -  1    Vitamin D deficiency        Prediabetes        Iron deficiency anemia, unspecified iron deficiency anemia type        Morbid obesity due to excess calories (H)           Follow-ups after your visit        Additional Services     HEALTH  REFERRAL       Your provider has referred you to a Health .    A Health  will reach out to the patient within three days, if the following criteria has been met.     Clinic: [facility]    Reason for Referral: Pre-Diabetes/Impaired Fasting Glucose/Impaired Glucose Tolerance and Obesity    Patient does have knowledge of this service.    Patient Criteria: Prediabetes/Impaired Fasting Glucose/Impaired Glucose Tolerance (A1C Score from 5.7 to 6.4) and Obesity (BMI > 35kg/m2)    Provider's Main Focus: Diet/Weight Loss and Lifestyle changes for diabetes prevention                  Your next 10 appointments already scheduled     Sep 25, 2018  9:40 AM CDT   SHORT with Elida Cameron DO   [facility]    [address] [place] [address] [phone]            Nov 08, 2018 11:00 AM CST   Return Visit with MADELEINE Vasquez CNP   [address] [address] [phone]              Who to contact     If you have questions or need follow up information about today's clinic visit or your schedule please contact [facility] directly at [phone].  Normal or non-critical lab and imaging results will be communicated to you by Skinny  letter or phone within 4 business days after the clinic has received the results. If you do not hear from us within 7 days, please contact the clinic through Holidu or phone. If you have a critical or abnormal lab result, we will notify you by phone as soon as possible.  Submit refill requests through Holidu or call your pharmacy and they will forward the refill request to us. Please allow 3 business days for your refill to be completed.          Additional Information About Your Visit        Holidu Information     Holidu gives you secure access to your electronic health record. If you see a primary care provider, you can also send messages to your care team and make appointments. If you have questions, please call your primary care clinic.  If you do not have a primary care provider, please call [phone] and they will assist you.        Care EveryWhere ID     This is your Care EveryWhere ID. This could be used by other organizations to access your [facility] medical records  [id number]        Your Vitals Were     Pulse Respirations Last Period Breastfeeding? BMI (Body Mass Index)       68 16 07/10/2018 (Exact Date) No 46.5 kg/m2        Blood Pressure from Last 3 Encounters:   08/08/18 126/81   08/08/18 110/72   07/03/18 133/86    Weight from Last 3 Encounters:   08/08/18 296 lb 14.4 oz (134.7 kg)   08/08/18 297 lb (134.7 kg)   07/03/18 288 lb 14.4 oz (131 kg)              We Performed the Following     CBC with platelets     Ferritin     HEALTH  REFERRAL     Hemoglobin A1c     Iron and iron binding capacity     Tissue transglutaminase keke IgA and IgG     Vitamin D Deficiency          Today's Medication Changes          These changes are accurate as of 8/8/18  1:27 PM.  If you have any questions, ask your nurse or doctor.               Start taking these medicines.        Dose/Directions    topiramate 50 MG tablet   Commonly known as:  TOPAMAX   Used for:  Vitamin D deficiency, Prediabetes, Morbid  obesity due to excess calories (H)   Started by:  Janet Castaneda APRN CNP        Dose:  75 mg   Take 1.5 tablets (75 mg) by mouth 2 times daily   Quantity:  270 tablet   Refills:  1            Where to get your medicines      These medications were sent to [facility], [place] - [address]  [address] [facility] [address]     Phone:  [phone]     topiramate 50 MG tablet    vitamin D 16023 UNIT capsule         Some of these will need a paper prescription and others can be bought over the counter.  Ask your nurse if you have questions.     Bring a paper prescription for each of these medications     phentermine 37.5 MG tablet                Primary Care Provider Office Phone # Fax #    Elida Cameron  [phone] [address] [facility] [address]        Equal Access to Services     [facility] CenterMESS[facility] : Hadii aad ku hadasho Soshreyas, waaxda luqadaha, qaybta kaalmada ademichaelyasaulo, holli wright . So [facility] [phone].    ATENCIÓN: Si habla español, tiene a riggs disposición servicios gratuitos de asistencia lingüística. Llame al [phone].    We comply with applicable federal civil rights laws and [place] laws. We do not discriminate on the basis of race, color, national origin, age, disability, sex, sexual orientation, or gender identity.            Thank you!     Thank you for choosing [facility]  for your care. Our goal is always to provide you with excellent care. Hearing back from our patients is one way we can continue to improve our services. Please take a few minutes to complete the written survey that you may receive in the mail after your visit with us. Thank you!             Your Updated Medication List - Protect others around you: Learn how to safely use, store and throw away your medicines at www.disposemymeds.org.          This list is accurate as of 8/8/18  1:27 PM.  Always use your most  recent med list.                   Brand Name Dispense Instructions for use Diagnosis    Ferrous Sulfate 27 MG Tabs      1 tab QD        metFORMIN 500 MG 24 hr tablet    GLUCOPHAGE-XR    180 tablet    Take 1 tablet (500 mg) by mouth 2 times daily (with meals)    Prediabetes, Vitamin D deficiency       omeprazole 40 MG capsule    priLOSEC          oxybutynin 10 MG 24 hr tablet    DITROPAN-XL     Take 10 mg by mouth        phentermine 37.5 MG tablet    ADIPEX-P    32 tablet    Take 1 tablet (37.5 mg) by mouth every morning (before breakfast)    Morbid obesity due to excess calories (H)       ranitidine 150 MG tablet    ZANTAC    60 tablet    Take 1 tablet (150 mg) by mouth 2 times daily    Gastroesophageal reflux disease, esophagitis presence not specified       sertraline 50 MG tablet    ZOLOFT    90 tablet    Take 1 tablet (50 mg) by mouth daily    PTSD (post-traumatic stress disorder), Adjustment disorder with mixed anxiety and depressed mood       topiramate 50 MG tablet    TOPAMAX    270 tablet    Take 1.5 tablets (75 mg) by mouth 2 times daily    Vitamin D deficiency, Prediabetes, Morbid obesity due to excess calories (H)       traZODone 150 MG tablet    DESYREL    90 tablet    Take 1 tablet (150 mg) by mouth nightly as needed for sleep    PTSD (post-traumatic stress disorder), Adjustment disorder with mixed anxiety and depressed mood, Adjustment insomnia       vitamin D 84077 UNIT capsule    ERGOCALCIFEROL    12 capsule    TAKE 1 CAPSULE BY MOUTH EVERY 7 DAYS    Vitamin D deficiency

## 2018-08-09 LAB
DEPRECATED CALCIDIOL+CALCIFEROL SERPL-MC: 17 UG/L (ref 20–75)
FERRITIN SERPL-MCNC: 4 NG/ML (ref 8–252)
IRON SATN MFR SERPL: 9 % (ref 15–46)
IRON SERPL-MCNC: 37 UG/DL (ref 35–180)
TIBC SERPL-MCNC: 395 UG/DL (ref 240–430)

## 2018-08-10 ENCOUNTER — TELEPHONE (OUTPATIENT)
Dept: NURSING | Facility: CLINIC | Age: 48
End: 2018-08-10

## 2018-08-10 LAB
TTG IGA SER-ACNC: <1 U/ML
TTG IGG SER-ACNC: <1 U/ML

## 2018-08-10 ASSESSMENT — PATIENT HEALTH QUESTIONNAIRE - PHQ9: SUM OF ALL RESPONSES TO PHQ QUESTIONS 1-9: 10

## 2018-08-10 ASSESSMENT — ANXIETY QUESTIONNAIRES: GAD7 TOTAL SCORE: 14

## 2018-08-10 NOTE — PROGRESS NOTES
Veronica,  Your D level is still low.  We can try changing your D replacement from D2 to D3, sometimes that helps.  I'll send a new Rx to your pharmacy.  You can use up your current supply of D2 before changing.  Janet Castaneda NP  Endocrinology

## 2018-08-10 NOTE — TELEPHONE ENCOUNTER
Received new Health Coaching Referral-Outreached patient for first time and was able to connect.  Patient had a lot going on in her life, but wants to start fresh with another set of coaching.  We scheduled our first visit for 8/21/2018.    [facility]    8/10/2018    11:38 AM

## 2018-08-11 NOTE — PROGRESS NOTES
Veronica,  The screening test for celiac disease was negative.  Let me know if you have any questions.  Janet Castaneda NP  Endocrinology

## 2018-08-21 ENCOUNTER — VIRTUAL VISIT (OUTPATIENT)
Dept: NURSING | Facility: CLINIC | Age: 48
End: 2018-08-21

## 2018-08-21 DIAGNOSIS — R73.03 PREDIABETES: ICD-10-CM

## 2018-08-21 PROCEDURE — 99207 ZZC HEALTH COACHING, NO CHARGE: CPT

## 2018-08-21 NOTE — MR AVS SNAPSHOT
After Visit Summary   2018    Keerthi Sloan    MRN: [id number]           Patient Information     Date Of Birth          1970        Visit Information        Provider Department      2018 10:00 AM PATRICIA PUENTES [facility]        Today's Diagnoses     BMI 40.0-44.9, adult (H)    -  1    Prediabetes          Care Instructions      2018    [address] [id number] [phone]  Health Coaching Progress Note    Patient Name: Keerthi Sloan Date: 2018      Plan/Goal for the Next 4 Weeks:    GOALS: Patient will work on the following goals until our next meetin) Continue to track steps with Iphone Coty or fit-bit (find ) and shoot for at least 3,000 steps per day-New Goal  2) Use stationary bike at home at least 2 times per week for 30 minutes each time-New Goal  3) Work on doing more regular daily walks with your dog-New Goal  4) Check out local food shelf options/fare for all and continue to work on new budget plan for groceries-New Goal  5) Work on pre-planning ahead for healthy meals each week and buy groceries according to plan-New Goal  6) Prepare healthy evening meals with granddaughter at least 3 times per week-New Goal      Plan: (Homework, other):  Patient was encouraged to continue to seek condition-related information and education, as well as schedule a follow up appointment with the Health  in 4 weeks. Patient has set self-identified goals and will monitor progress until the next appointment.  Scheduled our next coaching appointment for  at 10am.  Patricia Puentes       Resources:                Follow-ups after your visit        Your next 10 appointments already scheduled     Sep 18, 2018 10:00 AM CDT   Nurse Only with RISSER[facility]    [address] [facility] [id number] [phone]            Sep 25, 2018  9:40 AM CDT   [facility]ida Cameron,    [facility]    [address] [facility] [address] [phone]            Nov 08, 2018 11:00 AM CST   Return Visit with MADELEINE Vasquez CNP   [facility]    [address] [place] [address]. S  [facility] [address] [phone]              Who to contact     If you have questions or need follow up information about today's clinic visit or your schedule please contact [facility] directly at [phone].  Normal or non-critical lab and imaging results will be communicated to you by MyChart, letter or phone within 4 business days after the clinic has received the results. If you do not hear from us within 7 days, please contact the clinic through Urbantechhart or phone. If you have a critical or abnormal lab result, we will notify you by phone as soon as possible.  Submit refill requests through Twenty Jeans or call your pharmacy and they will forward the refill request to us. Please allow 3 business days for your refill to be completed.          Additional Information About Your Visit        UrbantechharLiveRSVP Information     Twenty Jeans gives you secure access to your electronic health record. If you see a primary care provider, you can also send messages to your care team and make appointments. If you have questions, please call your primary care clinic.  If you do not have a primary care provider, please call [phone] and they will assist you.        Care EveryWhere ID     This is your Care EveryWhere ID. This could be used by other organizations to access your [place] medical records  [id number]         Blood Pressure from Last 3 Encounters:   08/08/18 126/81   08/08/18 110/72   07/03/18 133/86    Weight from Last 3 Encounters:   08/08/18 296 lb  14.4 oz (134.7 kg)   08/08/18 297 lb (134.7 kg)   07/03/18 288 lb 14.4 oz (131 kg)              Today, you had the following     No orders found for display       Primary Care Provider Office Phone # Fax #    Elida Cameron [phone] [address] [facility] [address]        Equal Access to Services     NICOLETTE LEMON : Hadii aad ku hadasho Soomaali, waaxda luqadaha, qaybta kaalmada adeegyada, waxay idiin hayaan adeeg khpaullydia laangel . So [facility] [phone].    ATENCIÓN: Si daren jackson, tiene a riggs disposición servicios gratuitos de asistencia lingüística. Llame al [phone].    We comply with applicable federal civil rights laws and [place] laws. We do not discriminate on the basis of race, color, national origin, age, disability, sex, sexual orientation, or gender identity.            Thank you!     Thank you for choosing [facility]  for your care. Our goal is always to provide you with excellent care. Hearing back from our patients is one way we can continue to improve our services. Please take a few minutes to complete the written survey that you may receive in the mail after your visit with us. Thank you!             Your Updated Medication List - Protect others around you: Learn how to safely use, store and throw away your medicines at www.disposemymeds.org.          This list is accurate as of 8/21/18  2:01 PM.  Always use your most recent med list.                   Brand Name Dispense Instructions for use Diagnosis    cholecalciferol 49172 units capsule    VITAMIN D3    12 capsule    Take 1 capsule (50,000 Units) by mouth once a week    Vitamin D deficiency       Ferrous Sulfate 27 MG Tabs      1 tab QD        metFORMIN 500 MG 24 hr tablet    GLUCOPHAGE-XR    180 tablet    Take 1 tablet (500 mg) by mouth 2 times daily (with meals)    Prediabetes, Vitamin D deficiency       omeprazole 40 MG capsule    priLOSEC          oxybutynin 10 MG 24 hr tablet     DITROPAN-XL     Take 10 mg by mouth        phentermine 37.5 MG tablet    ADIPEX-P    32 tablet    Take 1 tablet (37.5 mg) by mouth every morning (before breakfast)    Morbid obesity due to excess calories (H)       ranitidine 150 MG tablet    ZANTAC    60 tablet    Take 1 tablet (150 mg) by mouth 2 times daily    Gastroesophageal reflux disease, esophagitis presence not specified       sertraline 50 MG tablet    ZOLOFT    90 tablet    Take 1 tablet (50 mg) by mouth daily    PTSD (post-traumatic stress disorder), Adjustment disorder with mixed anxiety and depressed mood       topiramate 50 MG tablet    TOPAMAX    270 tablet    Take 1.5 tablets (75 mg) by mouth 2 times daily    Vitamin D deficiency, Prediabetes, Morbid obesity due to excess calories (H)       traZODone 150 MG tablet    DESYREL    90 tablet    Take 1 tablet (150 mg) by mouth nightly as needed for sleep    PTSD (post-traumatic stress disorder), Adjustment disorder with mixed anxiety and depressed mood, Adjustment insomnia

## 2018-08-21 NOTE — PROGRESS NOTES
August 21, 2018    [address] [id number] [phone]  Health Coaching Progress Note    Patient Name: Keerthi Sloan Date: August 21, 2018      Session Length: 45      DATA    PRM Master Survey Scores Reviewed: Yes    Core Healthy Days Survey:    Would you say that in general your health is: : Fair    YAQUELIN Score (Last Two) 7/24/2017 8/21/2018   YAQUELIN Raw Score 32 28   Activation Score 62.6 50   YAQUELIN Level 3 2       PHQ-2 Score 8/21/2018 2/2/2018   PHQ-2 Total Score Interpretation - Positive if 3 or more points; Administer PHQ-9 if positive 2 2   MyC PHQ-2 Score - -       PHQ-9 SCORE 2/2/2018 8/8/2018   Total Score MyChart - 10 (Moderate depression)   Total Score 20 10       Treatment Objective(s) Addressed in This Session:  Target Behavior(s): diet/weight loss and disease management/lifestyle changes of making sure to have healthy foods on hand/looking at food shelf options for temporary challenging time and pre-planning meals ahead of time, cooking at least 3 healthy meals each week with granddaughter, making healthier dietary choices/not skipping meals/portion size,  getting into an exercise routine again-walking the dog, using new stationary bike at home, making time for self/relaxation, tracking steps with fit-bit (needs to find it) or her phone and find her new average, weight loss and maintenance, an accessing resources!      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 other options, niece passed away unexpectedly mom unexpectedly moved out-financial changes,  limited activity due to ongoing knee pain/just got a stationary bike from her friend to use at home,  frustrated she gained a lot of weight lost back again, staying on track with her diet-not stress eating, finding her fit-bit charging cord, making time to  walk her puppy      What Patient Does Well:   1) Patient is working hard to get back on track with a regular exercise routine and better eating habits-continues to learn what works and what doesn't  Previous Successes:   1) Patient is really working to get back on track after having some really bad months toward the end of last year-wants to get back to her old routine where she lost 50lbs!  Areas in Need of Improvement:   1) Activity level/exercise and consistency of exercise  2) Diet modification-eating less/healthier foods  3) Building a healthy routine and maintaining it  4) Weight loss and maintanence  Barriers to Change:   1) Adapting to new work schedule  2) Time-making time for self  3) Occasional knee pain/has been worse since driving bus again/weight gain  4) Trying to get back into a regular routine/regained a lot of weight she had previously lost  Reasons for Change:   1) Be healthier/move better-reduce strain on knees  2) Lose weight/avoid other health complications  3) Be around for grandkids/kids  Plan/Goal for the Next 4 Weeks:   GOAL #1: Continue to track steps with Iphone Coty or fit-bit (find ) and shoot for at least 3,000 steps per day  GOAL #1 Progress Toward Goal: 0% New Goal  GOAL #2: Use stationary bike at home at least 2 times per week for 30 minutes each time  GOAL #2 Progress Toward Goal: 0% New Goal  GOAL #3: Work on doing more regular daily walks with your dog  GOAL #3 Progress Toward Goal: 0% New Goal  GOAL #4: Check out local food shelf options/fare for all and continue to work on new budget plan for groceries  GOAL #4 Progress Toward Goal: 0% New Goal  GOAL #5: Work on pre-planning ahead for healthy meals each week and buy groceries according to plan  GOAL #5 Progress Toward Goal: 0% New Goal  GOAL #6: Prepare healthy evening meals with granddaughter at least 3 times per week  GOAL #6 Progress Toward Goal: 0% New Goal    Intervention:  Motivational Interviewing    MI  Intervention: Expressed Empathy/Understanding, Supported Autonomy, Collaboration, Evocation, Permission to raise concern or advise, Open-ended questions, Reflections: simple and complex, Rolled with resistance: Simple reflection, Complex reflection, Shifted topic to defuse resistance and Reframed sustain talk in the direction of change and Change talk (evoked)     Change Talk Expressed by the Patient: Desire to change Ability to change Reasons to change Need to change Committment to change Activation Taking steps    Provider Response to Change Talk: E - Evoked more info from patient about behavior change, A - Affirmed patient's thoughts, decisions, or attempts at behavior change, R - Reflected patient's change talk and S - Summarized patient's change talk statements    Assessment / Progress on Treatment Objective(s) / Homework:    New Objective established this session - PREPARATION (Decided to change - considering how); Intervened by negotiating a change plan and determining options / strategies for behavior change, identifying triggers, exploring social supports, and working towards setting a date to begin behavior change         Plan: (Homework, other):  Patient was encouraged to continue to seek condition-related information and education, as well as schedule a follow up appointment with the Health  in 4 weeks. Patient has set self-identified goals and will monitor progress until the next appointment.  Scheduled our next coaching appointment for Tuesday September 18th at 10am.     Jacob Dominguez

## 2018-08-21 NOTE — PATIENT INSTRUCTIONS
2018    [address] [address]  [phone]  Health Coaching Progress Note    Patient Name: Keerthi Sloan Date: 2018      Plan/Goal for the Next 4 Weeks:    GOALS: Patient will work on the following goals until our next meetin) Continue to track steps with Iphone Coty or fit-bit (find ) and shoot for at least 3,000 steps per day-New Goal  2) Use stationary bike at home at least 2 times per week for 30 minutes each time-New Goal  3) Work on doing more regular daily walks with your dog-New Goal  4) Check out local food shelf options/fare for all and continue to work on new budget plan for groceries-New Goal  5) Work on pre-planning ahead for healthy meals each week and buy groceries according to plan-New Goal  6) Prepare healthy evening meals with granddaughter at least 3 times per week-New Goal      Plan: (Homework, other):  Patient was encouraged to continue to seek condition-related information and education, as well as schedule a follow up appointment with the Health  in 4 weeks. Patient has set self-identified goals and will monitor progress until the next appointment.  Scheduled our next coaching appointment for  at 10am.  Jacob Dominguez       Resources:

## 2018-09-18 ENCOUNTER — VIRTUAL VISIT (OUTPATIENT)
Dept: NURSING | Facility: CLINIC | Age: 48
End: 2018-09-18

## 2018-09-18 DIAGNOSIS — R73.03 PREDIABETES: ICD-10-CM

## 2018-09-18 PROCEDURE — 99207 ZZC HEALTH COACHING, NO CHARGE: CPT

## 2018-09-18 NOTE — MR AVS SNAPSHOT
After Visit Summary   9/18/2018    Keerthi Sloan    MRN: [id number]           Patient Information     Date Of Birth          1970        Visit Information        Provider Department      9/18/2018 11:00 AM PATRICIA PUENTES [facility]        Today's Diagnoses     BMI 40.0-44.9, adult (H)    -  1    Prediabetes           Follow-ups after your visit        Your next 10 appointments already scheduled     Sep 25, 2018  9:40 AM CDT   SHORT with Elida Cameron DO   [facility] ([facility])    [address] [facility] [id number] [phone]            Oct 16, 2018 10:00 AM CDT   Telephone Visit with PATRICIA PUENTES   [facility] ([facility])    [address] [place] [id number] [phone]           Note: this is not an onsite visit; there is no need to come to the facility.            Nov 08, 2018 11:00 AM CST   Return Visit with MADELEINE Vasquez CNP   [facility] ([facility])    [id number] [place] [address]. S  [facility] [id number] [phone]              Who to contact     If you have questions or need follow up information about today's clinic visit or your schedule please contact [facility] directly at [phone].  Normal or non-critical lab and imaging results will be communicated to you by MyChart, letter or phone within 4 business days after the clinic has received the results. If you do not hear from us within 7 days, please contact the clinic through MyChart or phone. If you have a critical or abnormal lab result, we will notify you by phone as soon as possible.  Submit refill requests through [employer] or call your pharmacy and they will forward the refill request to us. Please allow 3 business days for your refill to be completed.          Additional Information About Your Visit        [facility]  Information     [employer] gives you secure access to your electronic health record. If you see a primary care provider, you can also send messages to your care team and make appointments. If you have questions, please call your primary care clinic.  If you do not have a primary care provider, please call [phone] and they will assist you.        Care EveryWhere ID     This is your Care EveryWhere ID. This could be used by other organizations to access your [facility] medical records  [id number]         Blood Pressure from Last 3 Encounters:   08/08/18 126/81   08/08/18 110/72   07/03/18 133/86    Weight from Last 3 Encounters:   08/08/18 296 lb 14.4 oz (134.7 kg)   08/08/18 297 lb (134.7 kg)   07/03/18 288 lb 14.4 oz (131 kg)              Today, you had the following     No orders found for display       Primary Care Provider Office Phone # Fax #    Elida CameronDO [phone] [address] [facility] [address]        Equal Access to Services     [facility]: Hadii aad ku hadasho Soomaali, waaxda luqadaha, qaybta kaalmada adeegyada, waxcodi wright . So [facility] [phone].    ATENCIÓN: Si habla español, tiene a riggs disposición servicios gratuitos de asistencia lingüística. Llame al [phone].    We comply with applicable federal civil rights laws and [place] laws. We do not discriminate on the basis of race, color, national origin, age, disability, sex, sexual orientation, or gender identity.            Thank you!     Thank you for choosing [facility]  for your care. Our goal is always to provide you with excellent care. Hearing back from our patients is one way we can continue to improve our services. Please take a few minutes to complete the written survey that you may receive in the mail after your visit with us. Thank you!             Your Updated Medication List - Protect others around you: Learn how to safely use, store and throw  away your medicines at www.disposemymeds.org.          This list is accurate as of 9/18/18  1:04 PM.  Always use your most recent med list.                   Brand Name Dispense Instructions for use Diagnosis    cholecalciferol 19386 units capsule    VITAMIN D3    12 capsule    Take 1 capsule (50,000 Units) by mouth once a week    Vitamin D deficiency       Ferrous Sulfate 27 MG Tabs      1 tab QD        metFORMIN 500 MG 24 hr tablet    GLUCOPHAGE-XR    180 tablet    Take 1 tablet (500 mg) by mouth 2 times daily (with meals)    Prediabetes, Vitamin D deficiency       omeprazole 40 MG capsule    priLOSEC          oxybutynin 10 MG 24 hr tablet    DITROPAN-XL     Take 10 mg by mouth        phentermine 37.5 MG tablet    ADIPEX-P    32 tablet    Take 1 tablet (37.5 mg) by mouth every morning (before breakfast)    Morbid obesity due to excess calories (H)       ranitidine 150 MG tablet    ZANTAC    60 tablet    Take 1 tablet (150 mg) by mouth 2 times daily    Gastroesophageal reflux disease, esophagitis presence not specified       sertraline 50 MG tablet    ZOLOFT    90 tablet    Take 1 tablet (50 mg) by mouth daily    PTSD (post-traumatic stress disorder), Adjustment disorder with mixed anxiety and depressed mood       topiramate 50 MG tablet    TOPAMAX    270 tablet    Take 1.5 tablets (75 mg) by mouth 2 times daily    Vitamin D deficiency, Prediabetes, Morbid obesity due to excess calories (H)       traZODone 150 MG tablet    DESYREL    90 tablet    Take 1 tablet (150 mg) by mouth nightly as needed for sleep    PTSD (post-traumatic stress disorder), Adjustment disorder with mixed anxiety and depressed mood, Adjustment insomnia

## 2018-09-18 NOTE — PROGRESS NOTES
September 18, 2018    [address] [id number] [phone]  Health Coaching Progress Note    Patient Name: Keerthi Sloan Date: September 18, 2018      Session Length: 30      DATA    PRM Master Survey Scores Reviewed: Yes    Core Healthy Days Survey:         YAQUELIN Score (Last Two) 7/24/2017 8/21/2018   YAQUELIN Raw Score 32 28   Activation Score 62.6 50   YAQUELIN Level 3 2       PHQ-2 Score 8/21/2018 2/2/2018   PHQ-2 Total Score Interpretation - Positive if 3 or more points; Administer PHQ-9 if positive 2 2   MyC PHQ-2 Score - -       PHQ-9 SCORE 2/2/2018 8/8/2018   Total Score MyChart - 10 (Moderate depression)   Total Score 20 10          Treatment Objective(s) Addressed in This Session:  Target Behavior(s): diet/weight loss and disease management/lifestyle changes of making sure to have healthy foods on hand/using food shelf options for temporary challenging financial time and pre-planning meals ahead of time, cooking at least 3 healthy meals each week with granddaughter, making healthier dietary choices/not skipping meals/portion size, getting into an exercise routine again-walking the dog, using new stationary bike at home/making space for it in her garage, making time for self/relaxation, tracking steps with fit-bit to shoot for 3,000 steps per day, weight loss and maintenance, an accessing resources!      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, niece passed away unexpectedly mom unexpectedly moved out-financial changes,  limited activity due to ongoing knee pain/just got a stationary bike from her friend to use at home,  frustrated she gained a lot of weight lost back again, staying on track with her diet-not stress eating, finding her fit-bit  charging cord, making time to walk her puppy      What Patient Does Well:   1) Patient is working hard to get back on track with a regular exercise routine and better eating habits-continues to learn what works and what doesn't  Previous Successes:   1) Patient hasn't been in to the clinic to weigh in, but reports about 5lbs of weight loss on home scale today from previous clinic weigh-in.  Areas in Need of Improvement:   1) Activity level/exercise and consistency of exercise  2) Diet modification-eating less/healthier foods  3) Building a healthy routine and maintaining it  4) Weight loss and maintanence  Barriers to Change:   1) Adapting to new work schedule  2) Time-making time for self  3) Occasional knee pain/has been worse since driving bus again/weight gain  4) Trying to get back into a regular routine/regained a lot of weight she had previously lost  Reasons for Change:   1) Be healthier/move better-reduce strain on knees  2) Lose weight/avoid other health complications  3) Be around for grandkids/kids  Plan/Goal for the Next 4 Weeks:   GOAL #1: Continue to track steps with fit-bit and shoot for at least 3,000 steps per day  GOAL #1 Progress Toward Goal: 25% (Found her fit-bit --just needs to remember to put fit-bit on each day)  GOAL #2: Use stationary bike at home at least 2 times per week for 30 minutes each time  GOAL #2 Progress Toward Goal: 0% (Hasn't started using this yet due to not having a good space for it-working on cleaning out her garage)  GOAL #3: Work on doing more regular daily walks with your dog  GOAL #3 Progress Toward Goal: 25% (Has been walking the dog more than before, but not a set routine-hopes to make this a regular occurrence most days of the week)  GOAL #4: Check out local food shelf options/fare for all and continue to work on new budget plan for groceries  GOAL #4 Progress Toward Goal: 100% Completed (Really likes fare for all)  GOAL #5: Work on pre-planning ahead for  healthy meals each week and buy groceries according to plan  GOAL #5 Progress Toward Goal: 50% (Has been trying to plan ahead for healthy meals-kind of tough using the food shelf, but is doing the best she can)  GOAL #6: Prepare healthy evening meals with granddaughter at least 3 times per week  GOAL #6 Progress Toward Goal: 50% (Has been doing better cooking meals with granddaughter a few days a week-hoping to do more)  GOAL #7: Continue to use local food shelf options/fare for all and work on new budget plan for groceries  GOAL #7 Progress Toward Goal: 0% New Goal  GOAL #8: Keep working on cleaning out garage to make space for treadmill/stationary bike  GOAL #8 Progress Toward Goal: 0% New Goal    Intervention:  Motivational Interviewing    MI Intervention: Expressed Empathy/Understanding, Supported Autonomy, Collaboration, Evocation, Permission to raise concern or advise, Open-ended questions, Reflections: simple and complex and Change talk (evoked)     Change Talk Expressed by the Patient: Desire to change Ability to change Reasons to change Need to change Committment to change Activation Taking steps    Provider Response to Change Talk: E - Evoked more info from patient about behavior change, A - Affirmed patient's thoughts, decisions, or attempts at behavior change, R - Reflected patient's change talk and S - Summarized patient's change talk statements    Assessment / Progress on Treatment Objective(s) / Homework:    Achieved / completed to satisfaction - MAINTENANCE (Working to maintain change, with risk of relapse); Intervened by continuing to positively reinforce healthy behavior choice   Minimal progress - PREPARATION (Decided to change - considering how); Intervened by negotiating a change plan and determining options / strategies for behavior change, identifying triggers, exploring social supports, and working towards setting a date to begin behavior change  New Objective established this session -  PREPARATION (Decided to change - considering how); Intervened by negotiating a change plan and determining options / strategies for behavior change, identifying triggers, exploring social supports, and working towards setting a date to begin behavior change  Satisfactory progress - ACTION (Actively working towards change); Intervened by reinforcing change plan / affirming steps taken         Plan: (Homework, other):  Patient was encouraged to continue to seek condition-related information and education, as well as schedule a follow up appointment with the Health  in 4 weeks. Patient has set self-identified goals and will monitor progress until the next appointment.  Scheduled our next coaching call for Tuesday October 16th at 10am.      Jacob Dominguez

## 2018-09-25 ENCOUNTER — OFFICE VISIT (OUTPATIENT)
Dept: FAMILY MEDICINE | Facility: CLINIC | Age: 48
End: 2018-09-25
Payer: OTHER MISCELLANEOUS

## 2018-09-25 ENCOUNTER — ALLIED HEALTH/NURSE VISIT (OUTPATIENT)
Dept: NURSING | Facility: CLINIC | Age: 48
End: 2018-09-25
Payer: COMMERCIAL

## 2018-09-25 VITALS
SYSTOLIC BLOOD PRESSURE: 132 MMHG | BODY MASS INDEX: 46.8 KG/M2 | DIASTOLIC BLOOD PRESSURE: 90 MMHG | WEIGHT: 293 LBS | TEMPERATURE: 97.9 F | HEART RATE: 66 BPM | OXYGEN SATURATION: 98 %

## 2018-09-25 DIAGNOSIS — F43.22 ADJUSTMENT DISORDER WITH ANXIOUS MOOD: ICD-10-CM

## 2018-09-25 DIAGNOSIS — Z23 NEED FOR PROPHYLACTIC VACCINATION AND INOCULATION AGAINST INFLUENZA: Primary | ICD-10-CM

## 2018-09-25 DIAGNOSIS — F43.10 PTSD (POST-TRAUMATIC STRESS DISORDER): ICD-10-CM

## 2018-09-25 DIAGNOSIS — Z02.6 ENCOUNTER RELATED TO WORKER'S COMPENSATION CLAIM: Primary | ICD-10-CM

## 2018-09-25 PROCEDURE — 99213 OFFICE O/P EST LOW 20 MIN: CPT | Performed by: FAMILY MEDICINE

## 2018-09-25 PROCEDURE — 90471 IMMUNIZATION ADMIN: CPT

## 2018-09-25 PROCEDURE — 90686 IIV4 VACC NO PRSV 0.5 ML IM: CPT

## 2018-09-25 ASSESSMENT — ANXIETY QUESTIONNAIRES
1. FEELING NERVOUS, ANXIOUS, OR ON EDGE: NEARLY EVERY DAY
IF YOU CHECKED OFF ANY PROBLEMS ON THIS QUESTIONNAIRE, HOW DIFFICULT HAVE THESE PROBLEMS MADE IT FOR YOU TO DO YOUR WORK, TAKE CARE OF THINGS AT HOME, OR GET ALONG WITH OTHER PEOPLE: SOMEWHAT DIFFICULT
6. BECOMING EASILY ANNOYED OR IRRITABLE: SEVERAL DAYS
3. WORRYING TOO MUCH ABOUT DIFFERENT THINGS: NEARLY EVERY DAY
2. NOT BEING ABLE TO STOP OR CONTROL WORRYING: MORE THAN HALF THE DAYS
5. BEING SO RESTLESS THAT IT IS HARD TO SIT STILL: MORE THAN HALF THE DAYS

## 2018-09-25 ASSESSMENT — PATIENT HEALTH QUESTIONNAIRE - PHQ9: 5. POOR APPETITE OR OVEREATING: MORE THAN HALF THE DAYS

## 2018-09-25 NOTE — NURSING NOTE
Prior to injection verified patient identity using patient's name and date of birth.  Due to injection administration, patient instructed to remain in clinic for 15 minutes  afterwards, and to report any adverse reaction to me immediately.  Gabby Cortez M.A.
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
## 2018-09-25 NOTE — MR AVS SNAPSHOT
After Visit Summary   9/25/2018    Keerthi Sloan    MRN: [id number]           Patient Information     Date Of Birth          1970        Visit Information        Provider Department      9/25/2018 9:30 AM LUIS ORTEGA/LPN [facility]        Today's Diagnoses     Need for prophylactic vaccination and inoculation against influenza    -  1       Follow-ups after your visit        Your next 10 appointments already scheduled     Oct 01, 2018  9:20 AM CDT   SHORT with Elida Cameron DO   [facility] ([facility]    [address] [place] [id number] [phone]            Oct 16, 2018 10:00 AM CDT   Telephone Visit with PATRICIA PUENTES   [facility] ([facility])    [address] [place] [phone]           Note: this is not an onsite visit; there is no need to come to the facility.            Oct 30, 2018  9:40 AM CDT   SHORT with Elida Cameron DO   [facility] ([facility])    [address] [facility] [id number] [phone]            Nov 08, 2018 11:00 AM CST   Return Visit with MADELEINE Vasquez CNP   [facility] ([address] [phone]              Who to contact     If you have questions or need follow up information about today's clinic visit or your schedule please contact [facility] directly at [phone].  Normal or non-critical lab and imaging results will be communicated to you by MyChart, letter or phone within 4 business days after the clinic has received the results. If you do not hear from us within 7 days, please contact the clinic through MyChart or phone. If you have a critical or abnormal lab result, we will notify you by phone as soon as possible.  Submit  refill requests through [employer] or call your pharmacy and they will forward the refill request to us. Please allow 3 business days for your refill to be completed.          Additional Information About Your Visit        pMDsofthart Information     [employer] gives you secure access to your electronic health record. If you see a primary care provider, you can also send messages to your care team and make appointments. If you have questions, please call your primary care clinic.  If you do not have a primary care provider, please call [phone] and they will assist you.        Care EveryWhere ID     This is your Care EveryWhere ID. This could be used by other organizations to access your [place] medical records  [id number]         Blood Pressure from Last 3 Encounters:   09/25/18 132/90   08/08/18 126/81   08/08/18 110/72    Weight from Last 3 Encounters:   09/25/18 298 lb 12.8 oz (135.5 kg)   08/08/18 296 lb 14.4 oz (134.7 kg)   08/08/18 297 lb (134.7 kg)              We Performed the Following     FLU VACCINE, SPLIT VIRUS, IM (QUADRIVALENT) [19337]- >3 YRS     Vaccine Administration, Initial [14312]        Primary Care Provider Office Phone # Fax #    Elida Cameron [phone] [address] [facility] [address]        Equal Access to Services     MITCHELL LEMON : Hadii aad ku hadasho Soomaali, waaxda luqadaha, qaybta kaalmada adeegyada, holli garcia. So [facility] [phone].    ATENCIÓN: Si habla español, tiene a riggs disposición servicios gratuitos de asistencia lingüística. Frankie al [phone].    We comply with applicable federal civil rights laws and [place] laws. We do not discriminate on the basis of race, color, national origin, age, disability, sex, sexual orientation, or gender identity.            Thank you!     Thank you for choosing [facility]  for your care. Our goal is always to provide you with excellent care. Hearing back  from our patients is one way we can continue to improve our services. Please take a few minutes to complete the written survey that you may receive in the mail after your visit with us. Thank you!             Your Updated Medication List - Protect others around you: Learn how to safely use, store and throw away your medicines at www.disposemymeds.org.          This list is accurate as of 9/25/18 11:10 AM.  Always use your most recent med list.                   Brand Name Dispense Instructions for use Diagnosis    cholecalciferol 58134 units capsule    VITAMIN D3    12 capsule    Take 1 capsule (50,000 Units) by mouth once a week    Vitamin D deficiency       Ferrous Sulfate 27 MG Tabs      1 tab QD        metFORMIN 500 MG 24 hr tablet    GLUCOPHAGE-XR    180 tablet    Take 1 tablet (500 mg) by mouth 2 times daily (with meals)    Prediabetes, Vitamin D deficiency       omeprazole 40 MG capsule    priLOSEC          oxybutynin 10 MG 24 hr tablet    DITROPAN-XL     Take 10 mg by mouth        phentermine 37.5 MG tablet    ADIPEX-P    32 tablet    Take 1 tablet (37.5 mg) by mouth every morning (before breakfast)    Morbid obesity due to excess calories (H)       ranitidine 150 MG tablet    ZANTAC    60 tablet    Take 1 tablet (150 mg) by mouth 2 times daily    Gastroesophageal reflux disease, esophagitis presence not specified       sertraline 50 MG tablet    ZOLOFT    90 tablet    Take 1 tablet (50 mg) by mouth daily    PTSD (post-traumatic stress disorder), Adjustment disorder with mixed anxiety and depressed mood       topiramate 50 MG tablet    TOPAMAX    270 tablet    Take 1.5 tablets (75 mg) by mouth 2 times daily    Vitamin D deficiency, Prediabetes, Morbid obesity due to excess calories (H)       traZODone 150 MG tablet    DESYREL    90 tablet    Take 1 tablet (150 mg) by mouth nightly as needed for sleep    PTSD (post-traumatic stress disorder), Adjustment disorder with mixed anxiety and depressed mood,  Adjustment insomnia

## 2018-09-25 NOTE — MR AVS SNAPSHOT
After Visit Summary   9/25/2018    Keerthi Sloan    MRN: [id number]           Patient Information     Date Of Birth          1970        Visit Information        Provider Department      9/25/2018 9:40 AM Elida Cameron DO [facility]        Today's Diagnoses     Encounter related to worker's compensation claim    -  1    PTSD (post-traumatic stress disorder)        Adjustment disorder with anxious mood           Follow-ups after your visit        Follow-up notes from your care team     Return in about 4 weeks (around 10/23/2018) for Workers comp follow up.      Your next 10 appointments already scheduled     Oct 01, 2018  9:20 AM CDT   SHORT with Elida Cameron DO   [facility] ([facility])    [address] [place] [id number] [phone]            Oct 16, 2018 10:00 AM CDT   Telephone Visit with PATRICIA PUENTES   [facility] ([facility])    [address] [place] [phone]           Note: this is not an onsite visit; there is no need to come to the facility.            Oct 30, 2018  9:40 AM CDT   SHORT with Elida Cameron DO   [facility] ([facility])    [address] [place] [phone] [phone]            Nov 08, 2018 11:00 AM CST   Return Visit with MADELEINE Vasquez CNP   [facility] ([facility])    [address] [place] [address]. [facility] [phone]              Who to contact     If you have questions or need follow up information about today's clinic visit or your schedule please contact [facility] directly at [phone].  Normal or non-critical lab and imaging results will be communicated to you by MyChart, letter or phone within 4 business days after the clinic has received the results.  If you do not hear from us within 7 days, please contact the clinic through [employer] or phone. If you have a critical or abnormal lab result, we will notify you by phone as soon as possible.  Submit refill requests through [employer] or call your pharmacy and they will forward the refill request to us. Please allow 3 business days for your refill to be completed.          Additional Information About Your Visit        [email or web address] Information     [employer] gives you secure access to your electronic health record. If you see a primary care provider, you can also send messages to your care team and make appointments. If you have questions, please call your primary care clinic.  If you do not have a primary care provider, please call [phone] and they will assist you.        Care EveryWhere ID     This is your Care EveryWhere ID. This could be used by other organizations to access your [place] medical records  [id number]        Your Vitals Were     Pulse Temperature Pulse Oximetry Breastfeeding? BMI (Body Mass Index)       66 97.9  F (36.6  C) (Oral) 98% No 46.8 kg/m2        Blood Pressure from Last 3 Encounters:   09/25/18 132/90   08/08/18 126/81   08/08/18 110/72    Weight from Last 3 Encounters:   09/25/18 298 lb 12.8 oz (135.5 kg)   08/08/18 296 lb 14.4 oz (134.7 kg)   08/08/18 297 lb (134.7 kg)              Today, you had the following     No orders found for display       Primary Care Provider Office Phone # Fax #    Elida Mackey DO [facility] [phone] [address] [facility] [address]        Equal Access to Services     [facility] : Hadii aad dorian hadasho Soomaali, waaxda luqadaha, qaybta kaalmada louis, holli garcia. So [facility] [phone].    ATENCIÓN: Si habla español, tiene a riggs disposición servicios gratuitos de asistencia lingüística. Llame al [phone].    We comply with applicable federal civil rights laws and [place] laws. We do not discriminate on  the basis of race, color, national origin, age, disability, sex, sexual orientation, or gender identity.            Thank you!     Thank you for choosing [facility]  for your care. Our goal is always to provide you with excellent care. Hearing back from our patients is one way we can continue to improve our services. Please take a few minutes to complete the written survey that you may receive in the mail after your visit with us. Thank you!             Your Updated Medication List - Protect others around you: Learn how to safely use, store and throw away your medicines at www.disposemymeds.org.          This list is accurate as of 9/25/18 11:59 PM.  Always use your most recent med list.                   Brand Name Dispense Instructions for use Diagnosis    cholecalciferol 16025 units capsule    VITAMIN D3    12 capsule    Take 1 capsule (50,000 Units) by mouth once a week    Vitamin D deficiency       Ferrous Sulfate 27 MG Tabs      1 tab QD        metFORMIN 500 MG 24 hr tablet    GLUCOPHAGE-XR    180 tablet    Take 1 tablet (500 mg) by mouth 2 times daily (with meals)    Prediabetes, Vitamin D deficiency       omeprazole 40 MG capsule    priLOSEC          oxybutynin 10 MG 24 hr tablet    DITROPAN-XL     Take 10 mg by mouth        phentermine 37.5 MG tablet    ADIPEX-P    32 tablet    Take 1 tablet (37.5 mg) by mouth every morning (before breakfast)    Morbid obesity due to excess calories (H)       ranitidine 150 MG tablet    ZANTAC    60 tablet    Take 1 tablet (150 mg) by mouth 2 times daily    Gastroesophageal reflux disease, esophagitis presence not specified       sertraline 50 MG tablet    ZOLOFT    90 tablet    Take 1 tablet (50 mg) by mouth daily    PTSD (post-traumatic stress disorder), Adjustment disorder with mixed anxiety and depressed mood       topiramate 50 MG tablet    TOPAMAX    270 tablet    Take 1.5 tablets (75 mg) by mouth 2 times daily    Vitamin D deficiency, Prediabetes,  Morbid obesity due to excess calories (H)       traZODone 150 MG tablet    DESYREL    90 tablet    Take 1 tablet (150 mg) by mouth nightly as needed for sleep    PTSD (post-traumatic stress disorder), Adjustment disorder with mixed anxiety and depressed mood, Adjustment insomnia

## 2018-09-25 NOTE — PROGRESS NOTES

## 2018-09-26 ASSESSMENT — PATIENT HEALTH QUESTIONNAIRE - PHQ9: SUM OF ALL RESPONSES TO PHQ QUESTIONS 1-9: 15

## 2018-10-01 ENCOUNTER — OFFICE VISIT (OUTPATIENT)
Dept: FAMILY MEDICINE | Facility: CLINIC | Age: 48
End: 2018-10-01
Payer: COMMERCIAL

## 2018-10-01 VITALS
RESPIRATION RATE: 14 BRPM | HEART RATE: 69 BPM | WEIGHT: 293 LBS | SYSTOLIC BLOOD PRESSURE: 104 MMHG | DIASTOLIC BLOOD PRESSURE: 80 MMHG | OXYGEN SATURATION: 97 % | BODY MASS INDEX: 46.99 KG/M2 | TEMPERATURE: 98.6 F

## 2018-10-01 DIAGNOSIS — D50.9 IRON DEFICIENCY ANEMIA, UNSPECIFIED IRON DEFICIENCY ANEMIA TYPE: ICD-10-CM

## 2018-10-01 DIAGNOSIS — D25.9 UTERINE LEIOMYOMA, UNSPECIFIED LOCATION: ICD-10-CM

## 2018-10-01 DIAGNOSIS — K62.5 RECTAL BLEEDING: ICD-10-CM

## 2018-10-01 DIAGNOSIS — N94.6 DYSMENORRHEA: Primary | ICD-10-CM

## 2018-10-01 PROCEDURE — 99214 OFFICE O/P EST MOD 30 MIN: CPT | Performed by: FAMILY MEDICINE

## 2018-10-01 ASSESSMENT — PATIENT HEALTH QUESTIONNAIRE - PHQ9
SUM OF ALL RESPONSES TO PHQ QUESTIONS 1-9: 8
10. IF YOU CHECKED OFF ANY PROBLEMS, HOW DIFFICULT HAVE THESE PROBLEMS MADE IT FOR YOU TO DO YOUR WORK, TAKE CARE OF THINGS AT HOME, OR GET ALONG WITH OTHER PEOPLE: SOMEWHAT DIFFICULT
SUM OF ALL RESPONSES TO PHQ QUESTIONS 1-9: 8

## 2018-10-01 ASSESSMENT — ANXIETY QUESTIONNAIRES
6. BECOMING EASILY ANNOYED OR IRRITABLE: SEVERAL DAYS
GAD7 TOTAL SCORE: 6
GAD7 TOTAL SCORE: 6
1. FEELING NERVOUS, ANXIOUS, OR ON EDGE: SEVERAL DAYS
5. BEING SO RESTLESS THAT IT IS HARD TO SIT STILL: SEVERAL DAYS
3. WORRYING TOO MUCH ABOUT DIFFERENT THINGS: SEVERAL DAYS
7. FEELING AFRAID AS IF SOMETHING AWFUL MIGHT HAPPEN: NOT AT ALL
GAD7 TOTAL SCORE: 6
7. FEELING AFRAID AS IF SOMETHING AWFUL MIGHT HAPPEN: NOT AT ALL
2. NOT BEING ABLE TO STOP OR CONTROL WORRYING: SEVERAL DAYS
4. TROUBLE RELAXING: SEVERAL DAYS

## 2018-10-01 NOTE — MR AVS SNAPSHOT
After Visit Summary   10/1/2018    Keerthi Sloan    MRN: [id number]           Patient Information     Date Of Birth          1970        Visit Information        Provider Department      10/1/2018 9:20 AM Elida Cameron DO [facility]        Today's Diagnoses     Dysmenorrhea    -  1    Iron deficiency anemia, unspecified iron deficiency anemia type        Uterine leiomyoma, unspecified location        Rectal bleeding           Follow-ups after your visit        Follow-up notes from your care team     Return in about 3 months (around 1/1/2019).      Your next 10 appointments already scheduled     Oct 16, 2018 10:00 AM CDT   Telephone Visit with PATRICIA PUENTES   [facility] ([facility])    [address] [facility] [address] [phone]           Note: this is not an onsite visit; there is no need to come to the facility.            Oct 30, 2018  9:40 AM CDT   SHORT with Elida Cameron DO   [facility] ([facility])    [address] [facility] [address] [phone]            Nov 08, 2018 11:00 AM CST   Return Visit with MADELEINE Vasquez CNP   [facility] ([facility]    [address] [place] [address]. [facility] [address] [phone]              Who to contact     If you have questions or need follow up information about today's clinic visit or your schedule please contact [facility] directly at [phone].  Normal or non-critical lab and imaging results will be communicated to you by MyChart, letter or phone within 4 business days after the clinic has received the results. If you do not hear from us within 7 days, please contact the clinic through MyChart or phone. If you have a critical or abnormal lab result, we will notify you by phone as soon as possible.  Submit refill requests through  [employer] or call your pharmacy and they will forward the refill request to us. Please allow 3 business days for your refill to be completed.          Additional Information About Your Visit        MyChart Information     [employer] gives you secure access to your electronic health record. If you see a primary care provider, you can also send messages to your care team and make appointments. If you have questions, please call your primary care clinic.  If you do not have a primary care provider, please call [phone] and they will assist you.        Care EveryWhere ID     This is your Care EveryWhere ID. This could be used by other organizations to access your [place] medical records  [id number]        Your Vitals Were     Pulse Temperature Respirations Pulse Oximetry BMI (Body Mass Index)       69 98.6  F (37  C) (Oral) 14 97% 46.99 kg/m2        Blood Pressure from Last 3 Encounters:   10/01/18 104/80   09/25/18 132/90   08/08/18 126/81    Weight from Last 3 Encounters:   10/01/18 300 lb (136.1 kg)   09/25/18 298 lb 12.8 oz (135.5 kg)   08/08/18 296 lb 14.4 oz (134.7 kg)              Today, you had the following     No orders found for display       Primary Care Provider Office Phone # Fax #    Elida CameronDO [phone] [address] [facility] [address]        Equal Access to Services     MITCHELL LEMON : Hadii edis araujoo Soshreyas, waaxda luqadaha, qaybta kaalmada salvadoryada, holli wright . So [facility] [phone].    ATENCIÓN: Si habla español, tiene a riggs disposición servicios gratuitos de asistencia lingüística. Frankie al [phone].    We comply with applicable federal civil rights laws and [place] laws. We do not discriminate on the basis of race, color, national origin, age, disability, sex, sexual orientation, or gender identity.            Thank you!     Thank you for choosing [facility]  for your care. Our goal is always to  provide you with excellent care. Hearing back from our patients is one way we can continue to improve our services. Please take a few minutes to complete the written survey that you may receive in the mail after your visit with us. Thank you!             Your Updated Medication List - Protect others around you: Learn how to safely use, store and throw away your medicines at www.disposemymeds.org.          This list is accurate as of 10/1/18  2:57 PM.  Always use your most recent med list.                   Brand Name Dispense Instructions for use Diagnosis    cholecalciferol 20133 units capsule    VITAMIN D3    12 capsule    Take 1 capsule (50,000 Units) by mouth once a week    Vitamin D deficiency       Ferrous Sulfate 27 MG Tabs      1 tab QD        metFORMIN 500 MG 24 hr tablet    GLUCOPHAGE-XR    180 tablet    Take 1 tablet (500 mg) by mouth 2 times daily (with meals)    Prediabetes, Vitamin D deficiency       omeprazole 40 MG capsule    priLOSEC          oxybutynin 10 MG 24 hr tablet    DITROPAN-XL     Take 10 mg by mouth        phentermine 37.5 MG tablet    ADIPEX-P    32 tablet    Take 1 tablet (37.5 mg) by mouth every morning (before breakfast)    Morbid obesity due to excess calories (H)       ranitidine 150 MG tablet    ZANTAC    60 tablet    Take 1 tablet (150 mg) by mouth 2 times daily    Gastroesophageal reflux disease, esophagitis presence not specified       sertraline 50 MG tablet    ZOLOFT    90 tablet    Take 1 tablet (50 mg) by mouth daily    PTSD (post-traumatic stress disorder), Adjustment disorder with mixed anxiety and depressed mood       topiramate 50 MG tablet    TOPAMAX    270 tablet    Take 1.5 tablets (75 mg) by mouth 2 times daily    Vitamin D deficiency, Prediabetes, Morbid obesity due to excess calories (H)       traZODone 150 MG tablet    DESYREL    90 tablet    Take 1 tablet (150 mg) by mouth nightly as needed for sleep    PTSD (post-traumatic stress disorder), Adjustment disorder  with mixed anxiety and depressed mood, Adjustment insomnia

## 2018-10-01 NOTE — PROGRESS NOTES
SUBJECTIVE:   Keerthi Sloan is a 48 year old female who presents to clinic today for the following health issues:      Consult- Follow up iron levels-dropping again, had ablation in 2013, hx of fibroid, starting to have irregular menses    History of menorrhagia and fibroids.  Had an ablation in 2013.  Periods have been slowly increasing in flow recently.      She started noticing intermittent rectal bleeding last month.  She noticed bright red blood in her stool and also on the TP two times in the past month.  History of constipation since her last pregnancy (years ago).        Answers for HPI/ROS submitted by the patient on 10/1/2018   If you checked off any problems, how difficult have these problems made it for you to do your work, take care of things at home, or get along with other people?: Somewhat difficult  PHQ9 TOTAL SCORE: 8  ANASTASIIA 7 TOTAL SCORE: 6      Problem list and histories reviewed & adjusted, as indicated.  Additional history: as documented    Patient Active Problem List   Diagnosis     Grief     Binge eating     Dysmenorrhea     Morbid obesity (H)     History of biliary T-tube placement     Prediabetes     Vitamin D deficiency     Anemia, iron deficiency     Obstructive sleep apnea syndrome     Stress incontinence in female     Uterine leiomyoma     BMI 40.0-44.9, adult (H)     PTSD (post-traumatic stress disorder)     Adjustment disorder with anxious mood     Past Surgical History:   Procedure Laterality Date     GYN SURGERY       HERNIA REPAIR       TUBAL LIGATION  1997    LBTL       Social History   Substance Use Topics     Smoking status: Never Smoker     Smokeless tobacco: Never Used     Alcohol use No     Family History   Problem Relation Age of Onset     Diabetes Father      Hypertension Father      KIDNEY DISEASE Father      Diabetes Brother      Hypertension Brother            Reviewed and updated as needed this visit by clinical staff  Tobacco       Reviewed and updated as  needed this visit by Provider         ROS:  Constitutional, HEENT, cardiovascular, pulmonary, gi and gu systems are negative, except as otherwise noted.    OBJECTIVE:     /80 (BP Location: Right arm, Patient Position: Chair, Cuff Size: Adult Large)  Pulse 69  Temp 98.6  F (37  C) (Oral)  Resp 14  Wt 300 lb (136.1 kg)  SpO2 97%  BMI 46.99 kg/m2  Body mass index is 46.99 kg/(m^2).  GENERAL: healthy, alert and no distress  ABDOMEN: soft, nontender, no hepatosplenomegaly, no masses and bowel sounds normal  RECTAL (female): hemorrhoid noted posteriorly    ASSESSMENT/PLAN:     1. Iron deficiency anemia, unspecified iron deficiency anemia type  2. Dysmenorrhea  3. Uterine leiomyoma, unspecified location  - Patient will see her OB/Gyn to discuss possible repeat ablation vs other treatment options     4. Rectal bleeding  - Most likely from hemorrhoid  - Discussed ways to soften stool   - If she notices persistent bleeding will get early colonoscopy     Follow up in 3 months     Elida Cameron DO  [facility]

## 2018-10-02 ASSESSMENT — ANXIETY QUESTIONNAIRES: GAD7 TOTAL SCORE: 6

## 2018-10-02 ASSESSMENT — PATIENT HEALTH QUESTIONNAIRE - PHQ9: SUM OF ALL RESPONSES TO PHQ QUESTIONS 1-9: 8

## 2018-10-16 ENCOUNTER — VIRTUAL VISIT (OUTPATIENT)
Dept: NURSING | Facility: CLINIC | Age: 48
End: 2018-10-16

## 2018-10-16 DIAGNOSIS — R73.03 PREDIABETES: ICD-10-CM

## 2018-10-16 PROCEDURE — 99207 ZZC HEALTH COACHING, NO CHARGE: CPT

## 2018-10-16 NOTE — PROGRESS NOTES
October 16, 2018    [address] [address]  [phone]  Health Coaching Progress Note    Patient Name: Keerthi Sloan Date: October 16, 2018      Session Length: 30      DATA    PRM Master Survey Scores Reviewed: Yes    Core Healthy Days Survey:         YAQUELIN Score (Last Two) 7/24/2017 8/21/2018   YAQUELIN Raw Score 32 28   Activation Score 62.6 50   YAQUELIN Level 3 2       PHQ-2 Score 8/21/2018 2/2/2018   PHQ-2 Total Score Interpretation - Positive if 3 or more points; Administer PHQ-9 if positive 2 2   MyC PHQ-2 Score - -       PHQ-9 SCORE 9/25/2018 10/1/2018   Total Score MyChart - 8 (Mild depression)   Total Score 15 8       Treatment Objective(s) Addressed in This Session:  Target Behavior(s): diet/weight loss and disease management/lifestyle changes of making sure to have healthy foods on hand/using food shelf options for temporary challenging financial time from being off work-hoping to get back on track with having healthy food on hand and pre-planning meals ahead of time, cooking at least 3 healthy meals each week with granddaughter, making healthier dietary choices/not skipping meals/portion size, getting into an exercise routine again-walking the dog, using new stationary bike or treadmill in the space in her garage that she cleared out, making time for self/relaxation, tracking steps with fit-bit to shoot for 3,000 steps per day, weight loss and maintenance, accessing resources and attending appointments as scheduled.      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-going okay as her sister works at the same garage now so that helps, niece passed away unexpectedly, mom unexpectedly moved out-financial changes,  limited activity due to ongoing knee pain/just got a stationary bike  from her friend to use at home,  frustrated she gained a lot of weight lost back again, staying on track with her diet-not stress eating, finding her fit-bit charging cord, making time to walk her puppy      What Patient Does Well:   1) Patient is working hard to get back on track with a regular exercise routine and better eating habits-continues to learn what works and what doesn't  Previous Successes:   1) Patient states her weight has been up and down but she does feel she is finally getting her routine back on track-hoping to see some movement with her weight soon!  Areas in Need of Improvement:   1) Activity level/exercise and consistency of exercise  2) Diet modification-eating less/healthier foods  3) Building a healthy routine and maintaining it  4) Weight loss and maintanence  Barriers to Change:   1) Adapting to new work schedule  2) Time-making time for self  3) Occasional knee pain/has been worse since driving bus again/weight gain  4) Trying to get back into a regular routine/regained a lot of weight she had previously lost  Reasons for Change:   1) Be healthier/move better-reduce strain on knees  2) Lose weight/avoid other health complications  3) Be around for grandkids/kids  Plan/Goal for the Next 4 Weeks:   GOAL #1: Continue to track steps with fit-bit and shoot for at least 3,000 steps per day  GOAL #1 Progress Toward Goal: 50% (Still struggles to be consistent with her steps/some weeks she averages 8,000 and then the next week 1,500-plans to make this more routine each week)  GOAL #2: Use stationary bike at home at least 2 times per week for 30 minutes each time  GOAL #2 Progress Toward Goal: 25% (Has the space just needs to make it part of her routine-does better when granddaughter works out with her)  GOAL #3: Work on doing more regular daily walks with your dog  GOAL #3 Progress Toward Goal: 25% (Has been doing a bit better walking her dog-but needs to get the dog a coat for colder weather  walks)  GOAL #4: Keep working on cleaning out garage to make space for treadmill/stationary bike  GOAL #4 Progress Toward Goal: 100% Completed (Got her garage all cleaned out and is ready to start using her equipment more regularly now!)  GOAL #5: Work on pre-planning ahead for healthy meals each week and buy groceries according to plan  GOAL #5 Progress Toward Goal: 75% (Still struggles to plan ahead on a lot of the days but hopes this will continue to improve as she builds a healthier routine with her granddaughter)  GOAL #6: Prepare healthy evening meals with granddaughter at least 3 times per week  GOAL #6 Progress Toward Goal: 75% (Has been doing better cooking more healthy meals at home with her granddaughter)  GOAL #7: Continue to use local food shelf options/fare for all and work on new budget plan for groceries  GOAL #7 Progress Toward Goal: 100% Maintenance (Still using in the mean time while she finishes up with some other financial challenges)    Intervention:  Motivational Interviewing    MI Intervention: Expressed Empathy/Understanding, Supported Autonomy, Collaboration, Evocation, Permission to raise concern or advise, Open-ended questions, Reflections: simple and complex and Change talk (evoked)     Change Talk Expressed by the Patient: Desire to change Ability to change Reasons to change Need to change Committment to change Activation Taking steps    Provider Response to Change Talk: E - Evoked more info from patient about behavior change, A - Affirmed patient's thoughts, decisions, or attempts at behavior change, R - Reflected patient's change talk and S - Summarized patient's change talk statements    Assessment / Progress on Treatment Objective(s) / Homework:    Achieved / completed to satisfaction - MAINTENANCE (Working to maintain change, with risk of relapse); Intervened by continuing to positively reinforce healthy behavior choice   Minimal progress - PREPARATION (Decided to change -  considering how); Intervened by negotiating a change plan and determining options / strategies for behavior change, identifying triggers, exploring social supports, and working towards setting a date to begin behavior change  New Objective established this session - PREPARATION (Decided to change - considering how); Intervened by negotiating a change plan and determining options / strategies for behavior change, identifying triggers, exploring social supports, and working towards setting a date to begin behavior change  Satisfactory progress - ACTION (Actively working towards change); Intervened by reinforcing change plan / affirming steps taken         Plan: (Homework, other):  Patient was encouraged to continue to seek condition-related information and education, as well as schedule a follow up appointment with the Health  in 4 weeks. Patient has set self-identified goals and will monitor progress until the next appointment.  Scheduled our next coaching session for Friday November 16th at 10am.      Jacob Dominguez

## 2018-10-16 NOTE — MR AVS SNAPSHOT
After Visit Summary   10/16/2018    Keerthi Sloan    MRN: [id number]           Patient Information     Date Of Birth          1970        Visit Information        Provider Department      10/16/2018 10:00 AM PATRICIA PUENTES [facility]        Today's Diagnoses     BMI 40.0-44.9, adult (H)    -  1    Prediabetes           Follow-ups after your visit        Your next 10 appointments already scheduled     Oct 30, 2018  9:40 AM CDT   [facility]ida Cameron DO   [facility]    [address] [facility] [id number] [phone]            Nov 08, 2018 11:00 AM CST   Return Visit with MADELEINE Vasquez CNP   [facility]    [address] [place] [address] [phone]            Nov 16, 2018 10:00 AM CST   Telephone Visit with PATRICIA PUENTES   [facility]    [address] [place] [address] [phone]           Note: this is not an onsite visit; there is no need to come to the facility.              Who to contact     If you have questions or need follow up information about today's clinic visit or your schedule please contact [facility] directly at [phone].  Normal or non-critical lab and imaging results will be communicated to you by MyChart, letter or phone within 4 business days after the clinic has received the results. If you do not hear from us within 7 days, please contact the clinic through MyChart or phone. If you have a critical or abnormal lab result, we will notify you by phone as soon as possible.  Submit refill requests through [employer] or call your pharmacy and they will forward the refill request to us. Please allow 3 business days for your refill to be completed.          Additional Information About Your Visit        [employer]  Information     [email or web address] gives you secure access to your electronic health record. If you see a primary care provider, you can also send messages to your care team and make appointments. If you have questions, please call your primary care clinic.  If you do not have a primary care provider, please call [phone] and they will assist you.        Care EveryWhere ID     This is your Care EveryWhere ID. This could be used by other organizations to access your [facility] medical records  [id number]         Blood Pressure from Last 3 Encounters:   10/01/18 104/80   09/25/18 132/90   08/08/18 126/81    Weight from Last 3 Encounters:   10/01/18 300 lb (136.1 kg)   09/25/18 298 lb 12.8 oz (135.5 kg)   08/08/18 296 lb 14.4 oz (134.7 kg)              Today, you had the following     No orders found for display       Primary Care Provider Office Phone # Fax #    Elida Mackey DO Nisha [phone] [address] [facility] [address]        Equal Access to Services     [facility]: Hadii aad ku hadasho Soomaali, waaxda luqadaha, qaybta kaalmada adeegyada, holli wright . So [facility] [phone].    ATENCIÓN: Si habla español, tiene a riggs disposición servicios gratuitos de asistencia lingüística. Llame al [phone].    We comply with applicable federal civil rights laws and [place] laws. We do not discriminate on the basis of race, color, national origin, age, disability, sex, sexual orientation, or gender identity.            Thank you!     Thank you for choosing [facility]  for your care. Our goal is always to provide you with excellent care. Hearing back from our patients is one way we can continue to improve our services. Please take a few minutes to complete the written survey that you may receive in the mail after your visit with us. Thank you!             Your Updated Medication List - Protect others around you: Learn how to safely use, store and  throw away your medicines at www.disposemymeds.org.          This list is accurate as of 10/16/18  2:28 PM.  Always use your most recent med list.                   Brand Name Dispense Instructions for use Diagnosis    cholecalciferol 18667 units capsule    VITAMIN D3    12 capsule    Take 1 capsule (50,000 Units) by mouth once a week    Vitamin D deficiency       Ferrous Sulfate 27 MG Tabs      1 tab QD        metFORMIN 500 MG 24 hr tablet    GLUCOPHAGE-XR    180 tablet    Take 1 tablet (500 mg) by mouth 2 times daily (with meals)    Prediabetes, Vitamin D deficiency       omeprazole 40 MG capsule    priLOSEC          oxybutynin 10 MG 24 hr tablet    DITROPAN-XL     Take 10 mg by mouth        phentermine 37.5 MG tablet    ADIPEX-P    32 tablet    Take 1 tablet (37.5 mg) by mouth every morning (before breakfast)    Morbid obesity due to excess calories (H)       ranitidine 150 MG tablet    ZANTAC    60 tablet    Take 1 tablet (150 mg) by mouth 2 times daily    Gastroesophageal reflux disease, esophagitis presence not specified       sertraline 50 MG tablet    ZOLOFT    90 tablet    Take 1 tablet (50 mg) by mouth daily    PTSD (post-traumatic stress disorder), Adjustment disorder with mixed anxiety and depressed mood       topiramate 50 MG tablet    TOPAMAX    270 tablet    Take 1.5 tablets (75 mg) by mouth 2 times daily    Vitamin D deficiency, Prediabetes, Morbid obesity due to excess calories (H)       traZODone 150 MG tablet    DESYREL    90 tablet    Take 1 tablet (150 mg) by mouth nightly as needed for sleep    PTSD (post-traumatic stress disorder), Adjustment disorder with mixed anxiety and depressed mood, Adjustment insomnia

## 2018-10-30 ENCOUNTER — OFFICE VISIT (OUTPATIENT)
Dept: FAMILY MEDICINE | Facility: CLINIC | Age: 48
End: 2018-10-30
Payer: OTHER MISCELLANEOUS

## 2018-10-30 VITALS
BODY MASS INDEX: 47.35 KG/M2 | RESPIRATION RATE: 96 BRPM | SYSTOLIC BLOOD PRESSURE: 137 MMHG | TEMPERATURE: 97.8 F | DIASTOLIC BLOOD PRESSURE: 90 MMHG | WEIGHT: 293 LBS | HEART RATE: 65 BPM

## 2018-10-30 DIAGNOSIS — F43.10 PTSD (POST-TRAUMATIC STRESS DISORDER): ICD-10-CM

## 2018-10-30 DIAGNOSIS — Z02.6 ENCOUNTER RELATED TO WORKER'S COMPENSATION CLAIM: Primary | ICD-10-CM

## 2018-10-30 DIAGNOSIS — F43.23 ADJUSTMENT DISORDER WITH MIXED ANXIETY AND DEPRESSED MOOD: ICD-10-CM

## 2018-10-30 DIAGNOSIS — F51.02 ADJUSTMENT INSOMNIA: ICD-10-CM

## 2018-10-30 PROCEDURE — 99213 OFFICE O/P EST LOW 20 MIN: CPT | Performed by: FAMILY MEDICINE

## 2018-10-30 RX ORDER — TRAZODONE HYDROCHLORIDE 150 MG/1
150 TABLET ORAL
Qty: 90 TABLET | Refills: 1 | Status: SHIPPED | OUTPATIENT
Start: 2018-10-30 | End: 2019-03-19

## 2018-10-30 ASSESSMENT — PATIENT HEALTH QUESTIONNAIRE - PHQ9
SUM OF ALL RESPONSES TO PHQ QUESTIONS 1-9: 10
5. POOR APPETITE OR OVEREATING: SEVERAL DAYS

## 2018-10-30 ASSESSMENT — ANXIETY QUESTIONNAIRES
6. BECOMING EASILY ANNOYED OR IRRITABLE: SEVERAL DAYS
GAD7 TOTAL SCORE: 7
3. WORRYING TOO MUCH ABOUT DIFFERENT THINGS: SEVERAL DAYS
7. FEELING AFRAID AS IF SOMETHING AWFUL MIGHT HAPPEN: SEVERAL DAYS
1. FEELING NERVOUS, ANXIOUS, OR ON EDGE: SEVERAL DAYS
5. BEING SO RESTLESS THAT IT IS HARD TO SIT STILL: SEVERAL DAYS
2. NOT BEING ABLE TO STOP OR CONTROL WORRYING: SEVERAL DAYS

## 2018-10-30 NOTE — MR AVS SNAPSHOT
After Visit Summary   10/30/2018    Keerthi Sloan    MRN: [id number]           Patient Information     Date Of Birth          1970        Visit Information        Provider Department      10/30/2018 9:40 AM Elida Cameron DO [facility]        Today's Diagnoses     PTSD (post-traumatic stress disorder)        Adjustment disorder with mixed anxiety and depressed mood        Adjustment insomnia           Follow-ups after your visit        Follow-up notes from your care team     Return in about 4 weeks (around 11/27/2018).      Your next 10 appointments already scheduled     Nov 08, 2018 11:00 AM CST   Return Visit with MADELEINE Vasquez CNP   [facility] ([facility])    [address] [place] [address] [phone]            Nov 16, 2018 10:00 AM CST   Telephone Visit with PATRICIA PUENTES   [facility] ([facility])    [address] [place] [address] [phone]           Note: this is not an onsite visit; there is no need to come to the facility.            Nov 27, 2018 10:00 AM CST   SHORT with Elida Cameron DO   [facility] ([facility])    [address] [place] [address] [phone]              Who to contact     If you have questions or need follow up information about today's clinic visit or your schedule please contact [facility] directly at [phone].  Normal or non-critical lab and imaging results will be communicated to you by MyChart, letter or phone within 4 business days after the clinic has received the results. If you do not hear from us within 7 days, please contact the clinic through MyChart or phone. If you have a critical or abnormal lab result, we will notify you by phone as soon as possible.  Submit refill requests through [employer] or call your  pharmacy and they will forward the refill request to us. Please allow 3 business days for your refill to be completed.          Additional Information About Your Visit        MyChart Information     [employer] gives you secure access to your electronic health record. If you see a primary care provider, you can also send messages to your care team and make appointments. If you have questions, please call your primary care clinic.  If you do not have a primary care provider, please call [phone] and they will assist you.        Care EveryWhere ID     This is your Care EveryWhere ID. This could be used by other organizations to access your [facility] medical records  [id number]        Your Vitals Were     Pulse Temperature Respirations Last Period Breastfeeding? BMI (Body Mass Index)    65 97.8  F (36.6  C) (Oral) 96 10/01/2018 (Approximate) No 47.35 kg/m2       Blood Pressure from Last 3 Encounters:   10/30/18 137/90   10/01/18 104/80   09/25/18 132/90    Weight from Last 3 Encounters:   10/30/18 302 lb 4.8 oz (137.1 kg)   10/01/18 300 lb (136.1 kg)   09/25/18 298 lb 12.8 oz (135.5 kg)              Today, you had the following     No orders found for display         Where to get your medicines      These medications were sent to [facility] [address]  [address] [place] [id number]     Phone:  [phone]     traZODone 150 MG tablet          Primary Care Provider Office Phone # Fax #    Elida Mackey [place] [phone] [address] [facility] [address]        Equal Access to Services     [facility] : Hadii edis alarcon hadasho Soshreyas, waaxda luqadaha, qaybta kaalmada holli myers. So [facility] [phone].    ATENCIÓN: Si habla español, tiene a riggs disposición servicios gratuitos de asistencia lingüística. Frankie odell [phone].    We comply with applicable federal civil rights laws and [place] laws. We  do not discriminate on the basis of race, color, national origin, age, disability, sex, sexual orientation, or gender identity.            Thank you!     Thank you for choosing [facility]  for your care. Our goal is always to provide you with excellent care. Hearing back from our patients is one way we can continue to improve our services. Please take a few minutes to complete the written survey that you may receive in the mail after your visit with us. Thank you!             Your Updated Medication List - Protect others around you: Learn how to safely use, store and throw away your medicines at www.disposemymeds.org.          This list is accurate as of 10/30/18 10:34 AM.  Always use your most recent med list.                   Brand Name Dispense Instructions for use Diagnosis    cholecalciferol 71201 units capsule    VITAMIN D3    12 capsule    Take 1 capsule (50,000 Units) by mouth once a week    Vitamin D deficiency       Ferrous Sulfate 27 MG Tabs      1 tab QD        metFORMIN 500 MG 24 hr tablet    GLUCOPHAGE-XR    180 tablet    Take 1 tablet (500 mg) by mouth 2 times daily (with meals)    Prediabetes, Vitamin D deficiency       omeprazole 40 MG capsule    priLOSEC          oxybutynin 10 MG 24 hr tablet    DITROPAN-XL     Take 10 mg by mouth        phentermine 37.5 MG tablet    ADIPEX-P    32 tablet    Take 1 tablet (37.5 mg) by mouth every morning (before breakfast)    Morbid obesity due to excess calories (H)       ranitidine 150 MG tablet    ZANTAC    60 tablet    Take 1 tablet (150 mg) by mouth 2 times daily    Gastroesophageal reflux disease, esophagitis presence not specified       sertraline 50 MG tablet    ZOLOFT    90 tablet    Take 1 tablet (50 mg) by mouth daily    PTSD (post-traumatic stress disorder), Adjustment disorder with mixed anxiety and depressed mood       topiramate 50 MG tablet    TOPAMAX    270 tablet    Take 1.5 tablets (75 mg) by mouth 2 times daily    Vitamin D  deficiency, Prediabetes, Morbid obesity due to excess calories (H)       traZODone 150 MG tablet    DESYREL    90 tablet    Take 1 tablet (150 mg) by mouth nightly as needed for sleep    PTSD (post-traumatic stress disorder), Adjustment disorder with mixed anxiety and depressed mood, Adjustment insomnia
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
## 2018-10-31 ASSESSMENT — ANXIETY QUESTIONNAIRES: GAD7 TOTAL SCORE: 7

## 2018-11-16 ENCOUNTER — VIRTUAL VISIT (OUTPATIENT)
Dept: NURSING | Facility: CLINIC | Age: 48
End: 2018-11-16

## 2018-11-16 DIAGNOSIS — R73.03 PREDIABETES: ICD-10-CM

## 2018-11-16 PROCEDURE — 99207 ZZC HEALTH COACHING, NO CHARGE: CPT

## 2018-11-16 NOTE — MR AVS SNAPSHOT
After Visit Summary   11/16/2018    Keerthi Sloan    MRN: [id number]           Patient Information     Date Of Birth          1970        Visit Information        Provider Department      11/16/2018 10:00 AM Jacob Dominguez [facility]        Today's Diagnoses     BMI 40.0-44.9, adult (H)    -  1    Prediabetes           Follow-ups after your visit        Your next 10 appointments already scheduled     Nov 27, 2018 10:00 AM CST   [facility]    [address] [facility] [address] [phone]            Dec 14, 2018 10:00 AM CST   [facility]   [facility]    [address] [address] [phone]           Note: this is not an onsite visit; there is no need to come to the facility.              Who to contact     If you have questions or need follow up information about today's clinic visit or your schedule please contact [facility] directly at [phone].  Normal or non-critical lab and imaging results will be communicated to you by [employer], letter or phone within 4 business days after the clinic has received the results. If you do not hear from us within 7 days, please contact the clinic through [employer] or phone. If you have a critical or abnormal lab result, we will notify you by phone as soon as possible.  Submit refill requests through [employer] or call your pharmacy and they will forward the refill request to us. Please allow 3 business days for your refill to be completed.          Additional Information About Your Visit        MyChart Information     [employer] gives you secure access to your electronic health record. If you see a primary care provider, you can also send messages to your care team and make appointments. If you have questions, please  call your primary care clinic.  If you do not have a primary care provider, please call [phone] and they will assist you.        Care EveryWhere ID     This is your Care EveryWhere ID. This could be used by other organizations to access your [place] medical records  [id number]         Blood Pressure from Last 3 Encounters:   10/30/18 137/90   10/01/18 104/80   09/25/18 132/90    Weight from Last 3 Encounters:   10/30/18 302 lb 4.8 oz (137.1 kg)   10/01/18 300 lb (136.1 kg)   09/25/18 298 lb 12.8 oz (135.5 kg)              Today, you had the following     No orders found for display       Primary Care Provider Office Phone # Fax #    Elida Cameron [phone] [address] [facility] [address]        Equal Access to Services     [facility] : Hadii aad ku hadasho Soomaali, waaxda luqadaha, qaybta kaalmada adeegyada, holli bettencourt haydavid wright . So [facility] [phone].    ATENCIÓN: Si habla español, tiene a riggs disposición servicios gratuitos de asistencia lingüística. Llame al [phone].    We comply with applicable federal civil rights laws and [place] laws. We do not discriminate on the basis of race, color, national origin, age, disability, sex, sexual orientation, or gender identity.            Thank you!     Thank you for choosing [facility]  for your care. Our goal is always to provide you with excellent care. Hearing back from our patients is one way we can continue to improve our services. Please take a few minutes to complete the written survey that you may receive in the mail after your visit with us. Thank you!             Your Updated Medication List - Protect others around you: Learn how to safely use, store and throw away your medicines at www.disposemymeds.org.          This list is accurate as of 11/16/18 11:59 PM.  Always use your most recent med list.                   Brand Name Dispense Instructions for use Diagnosis     Ferrous Sulfate 27 MG Tabs      1 tab QD        metFORMIN 500 MG 24 hr tablet    GLUCOPHAGE-XR    180 tablet    Take 1 tablet (500 mg) by mouth 2 times daily (with meals)    Prediabetes, Vitamin D deficiency       omeprazole 40 MG capsule    priLOSEC          oxybutynin 10 MG 24 hr tablet    DITROPAN-XL     Take 10 mg by mouth        phentermine 37.5 MG tablet    ADIPEX-P    32 tablet    Take 1 tablet (37.5 mg) by mouth every morning (before breakfast)    Morbid obesity due to excess calories (H)       ranitidine 150 MG tablet    ZANTAC    60 tablet    Take 1 tablet (150 mg) by mouth 2 times daily    Gastroesophageal reflux disease, esophagitis presence not specified       sertraline 50 MG tablet    ZOLOFT    90 tablet    Take 1 tablet (50 mg) by mouth daily    PTSD (post-traumatic stress disorder), Adjustment disorder with mixed anxiety and depressed mood       topiramate 50 MG tablet    TOPAMAX    270 tablet    Take 1.5 tablets (75 mg) by mouth 2 times daily    Vitamin D deficiency, Prediabetes, Morbid obesity due to excess calories (H)       traZODone 150 MG tablet    DESYREL    90 tablet    Take 1 tablet (150 mg) by mouth nightly as needed for sleep    PTSD (post-traumatic stress disorder), Adjustment disorder with mixed anxiety and depressed mood, Adjustment insomnia       vitamin D3 95246 units capsule    CHOLECALCIFEROL    12 capsule    Take 1 capsule (50,000 Units) by mouth once a week    Vitamin D deficiency

## 2018-11-16 NOTE — PROGRESS NOTES
November 16, 2018    [facility]  [id number] [place] [address]  [phone]  Health Coaching Progress Note    Patient Name: Keerthi Sloan Date: November 16, 2018      Session Length: 30      DATA    PRM Master Survey Scores Reviewed: Yes    Core Healthy Days Survey:         YAQUELIN Score (Last Two) 7/24/2017 8/21/2018   YAQUELIN Raw Score 32 28   Activation Score 62.6 50   YAQUELIN Level 3 2       PHQ-2 Score 8/21/2018 2/2/2018   PHQ-2 Total Score (Adult) - Positive if 3 or more points; Administer PHQ-9 if positive 2 2   MyC PHQ-2 Score - -       PHQ-9 SCORE 10/1/2018 10/30/2018   Total Score MyChart 8 (Mild depression) -   Total Score 8 10       Treatment Objective(s) Addressed in This Session:  Target Behavior(s): diet/weight loss and disease management/lifestyle changes of making sure to have healthy foods on hand/using food shelf options for temporary challenging financial time from being off work-using [employer] for help with food-hoping to get back on track with having healthy food on hand and pre-planning meals ahead of time, cooking at least 3 healthy meals each week with granddaughter, making healthier dietary choices/not skipping meals/portion size, getting into an exercise routine again-walking the dog, using new stationary bike or treadmill in the space in her garage that she cleared out, making time for self/relaxation, tracking steps with fit-bit to shoot for 3,000 steps per day, weight loss and maintenance, accessing resources and attending appointments as scheduled.      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-going okay as her sister works at the same garage now so that helps, niece passed away unexpectedly, mom  unexpectedly moved out-financial changes,  limited activity due to ongoing knee pain/got a stationary bike from her friend to use at home,  frustrated she gained a lot of weight lost back again, staying on track with her diet-not stress eating, finding her fit-bit charging cord, making time to walk her puppy      What Patient Does Well:   1) Patient is working hard to get back on track with a regular exercise routine and better eating habits-continues to learn what works and what doesn't  Previous Successes:   1) Patient still just trying to figure out a consistent routine that works and that she can stick with-hasn't seen much progress with her weight yet)  Areas in Need of Improvement:   1) Activity level/exercise and consistency of exercise  2) Diet modification-eating less/healthier foods  3) Building a healthy routine and maintaining it  4) Weight loss and maintanence  Barriers to Change:   1) Adapting to new work schedule  2) Time-making time for self  3) Occasional knee pain/has been worse since driving bus again/weight gain  4) Trying to get back into a regular routine/regained a lot of weight she had previously lost  Reasons for Change:   1) Be healthier/move better-reduce strain on knees  2) Lose weight/avoid other health complications  3) Be around for grandkids/kids  Plan/Goal for the Next 4 Weeks:   GOAL #1: Continue to track steps with fit-bit and shoot for at least 3,000 steps per day  GOAL #1 Progress Toward Goal: 75% (Hitting 2,000-2,500 most days-still room to improve her activity level)  GOAL #2: Use stationary bike at home at least 2 times per week for 30 minutes each time  GOAL #2 Progress Toward Goal: 25% (Son filled her exercise space with his stuff while he moves-needs to get it cleared out so she can use the bike again)  GOAL #3: Work on doing more regular daily walks with your dog  GOAL #3 Progress Toward Goal: 50% (Walks the dog occasionally, but isn't very consistent)  GOAL #4: Work on  pre-planning ahead for healthy meals each week and buy groceries according to plan  GOAL #4 Progress Toward Goal: 75% (Still struggles occasionally with failing to plan, but overall feels she is improving)  GOAL #5: Prepare healthy evening meals with granddaughter at least 3 times per week  GOAL #5 Progress Toward Goal: 100% Maintenance! (Jc and her have been cooking more, but could improve on cooking healthier items-provided healthy recipe web sites to check out)  GOAL #6: Continue to use local food shelf options/[employer] and work on new budget plan for groceries  GOAL #6 Progress Toward Goal: 100% Maintenance! (Still using this resource to help her get more healthy items at home to cook with)    Intervention:  Motivational Interviewing    MI Intervention: Expressed Empathy/Understanding, Supported Autonomy, Collaboration, Evocation, Permission to raise concern or advise, Open-ended questions, Reflections: simple and complex and Change talk (evoked)     Change Talk Expressed by the Patient: Desire to change Ability to change Reasons to change Need to change Committment to change    Provider Response to Change Talk: E - Evoked more info from patient about behavior change, A - Affirmed patient's thoughts, decisions, or attempts at behavior change, R - Reflected patient's change talk and S - Summarized patient's change talk statements    Assessment / Progress on Treatment Objective(s) / Homework:    Minimal progress - PREPARATION (Decided to change - considering how); Intervened by negotiating a change plan and determining options / strategies for behavior change, identifying triggers, exploring social supports, and working towards setting a date to begin behavior change  No improvement - CONTEMPLATION (Considering change and yet undecided); Intervened by assessing the negative and positive thinking (ambivalence) about behavior change  Satisfactory progress - ACTION (Actively working towards change);  Intervened by reinforcing change plan / affirming steps taken       Plan: (Homework, other):  Patient was encouraged to continue to seek condition-related information and education, as well as schedule a follow up appointment with the Health  in 4 weeks. Patient has set self-identified goals and will monitor progress until the next appointment.  Scheduled our next coaching call for Friday December 14th at 10am by phone.      Jacob Dominguez

## 2018-11-27 ENCOUNTER — OFFICE VISIT (OUTPATIENT)
Dept: FAMILY MEDICINE | Facility: CLINIC | Age: 48
End: 2018-11-27
Payer: OTHER MISCELLANEOUS

## 2018-11-27 VITALS
OXYGEN SATURATION: 96 % | SYSTOLIC BLOOD PRESSURE: 136 MMHG | TEMPERATURE: 98.3 F | HEART RATE: 86 BPM | DIASTOLIC BLOOD PRESSURE: 92 MMHG | BODY MASS INDEX: 47.22 KG/M2 | WEIGHT: 293 LBS

## 2018-11-27 DIAGNOSIS — F43.10 PTSD (POST-TRAUMATIC STRESS DISORDER): ICD-10-CM

## 2018-11-27 DIAGNOSIS — F43.23 ADJUSTMENT DISORDER WITH MIXED ANXIETY AND DEPRESSED MOOD: ICD-10-CM

## 2018-11-27 DIAGNOSIS — F51.02 ADJUSTMENT INSOMNIA: ICD-10-CM

## 2018-11-27 DIAGNOSIS — Z02.6 ENCOUNTER RELATED TO WORKER'S COMPENSATION CLAIM: Primary | ICD-10-CM

## 2018-11-27 PROCEDURE — 99213 OFFICE O/P EST LOW 20 MIN: CPT | Performed by: FAMILY MEDICINE

## 2018-11-27 ASSESSMENT — ANXIETY QUESTIONNAIRES
1. FEELING NERVOUS, ANXIOUS, OR ON EDGE: SEVERAL DAYS
3. WORRYING TOO MUCH ABOUT DIFFERENT THINGS: SEVERAL DAYS
3. WORRYING TOO MUCH ABOUT DIFFERENT THINGS: SEVERAL DAYS
2. NOT BEING ABLE TO STOP OR CONTROL WORRYING: SEVERAL DAYS
GAD7 TOTAL SCORE: 7
5. BEING SO RESTLESS THAT IT IS HARD TO SIT STILL: SEVERAL DAYS
1. FEELING NERVOUS, ANXIOUS, OR ON EDGE: SEVERAL DAYS
IF YOU CHECKED OFF ANY PROBLEMS ON THIS QUESTIONNAIRE, HOW DIFFICULT HAVE THESE PROBLEMS MADE IT FOR YOU TO DO YOUR WORK, TAKE CARE OF THINGS AT HOME, OR GET ALONG WITH OTHER PEOPLE: SOMEWHAT DIFFICULT
6. BECOMING EASILY ANNOYED OR IRRITABLE: SEVERAL DAYS
2. NOT BEING ABLE TO STOP OR CONTROL WORRYING: SEVERAL DAYS
IF YOU CHECKED OFF ANY PROBLEMS ON THIS QUESTIONNAIRE, HOW DIFFICULT HAVE THESE PROBLEMS MADE IT FOR YOU TO DO YOUR WORK, TAKE CARE OF THINGS AT HOME, OR GET ALONG WITH OTHER PEOPLE: SOMEWHAT DIFFICULT
5. BEING SO RESTLESS THAT IT IS HARD TO SIT STILL: SEVERAL DAYS
7. FEELING AFRAID AS IF SOMETHING AWFUL MIGHT HAPPEN: SEVERAL DAYS
7. FEELING AFRAID AS IF SOMETHING AWFUL MIGHT HAPPEN: SEVERAL DAYS
GAD7 TOTAL SCORE: 7
6. BECOMING EASILY ANNOYED OR IRRITABLE: SEVERAL DAYS

## 2018-11-27 ASSESSMENT — PATIENT HEALTH QUESTIONNAIRE - PHQ9
5. POOR APPETITE OR OVEREATING: SEVERAL DAYS
5. POOR APPETITE OR OVEREATING: SEVERAL DAYS
SUM OF ALL RESPONSES TO PHQ QUESTIONS 1-9: 13

## 2018-11-27 NOTE — MR AVS SNAPSHOT
After Visit Summary   11/27/2018    Keerthi Sloan    MRN: [id number]           Patient Information     Date Of Birth          1970        Visit Information        Provider Department      11/27/2018 10:00 AM Elida Cameron DO [facility]        Today's Diagnoses     Encounter related to worker's compensation claim    -  1    PTSD (post-traumatic stress disorder)        Adjustment disorder with mixed anxiety and depressed mood        Adjustment insomnia           Follow-ups after your visit        Follow-up notes from your care team     Return in about 4 weeks (around 12/25/2018).      Your next 10 appointments already scheduled     Dec 14, 2018 10:00 AM CST   Telephone Visit with Jacob Dominguez   [facility]    [address] [facility] [address] [phone]           Note: this is not an onsite visit; there is no need to come to the facility.            Dec 17, 2018  9:40 AM CST   SHORT with Elida Cameron DO   [facility] ([facility]    [address] [facility] [address] [phone]              Who to contact     If you have questions or need follow up information about today's clinic visit or your schedule please contact [facility] directly at [phone].  Normal or non-critical lab and imaging results will be communicated to you by MyChart, letter or phone within 4 business days after the clinic has received the results. If you do not hear from us within 7 days, please contact the clinic through MyChart or phone. If you have a critical or abnormal lab result, we will notify you by phone as soon as possible.  Submit refill requests through [employer] or call your pharmacy and they will forward the refill request to us. Please allow 3 business days for your refill to be completed.          Additional Information About  Your Visit        [employer] Information     [email or web address] gives you secure access to your electronic health record. If you see a primary care provider, you can also send messages to your care team and make appointments. If you have questions, please call your primary care clinic.  If you do not have a primary care provider, please call [phone] and they will assist you.        Care EveryWhere ID     This is your Care EveryWhere ID. This could be used by other organizations to access your [facility] records  [id number]        Your Vitals Were     Pulse Temperature Last Period Pulse Oximetry Breastfeeding? BMI (Body Mass Index)    86 98.3  F (36.8  C) (Oral) 11/13/2018 (Approximate) 96% No 47.22 kg/m2       Blood Pressure from Last 3 Encounters:   11/27/18 (!) 136/92   10/30/18 137/90   10/01/18 104/80    Weight from Last 3 Encounters:   11/27/18 301 lb 8 oz (136.8 kg)   10/30/18 302 lb 4.8 oz (137.1 kg)   10/01/18 300 lb (136.1 kg)              Today, you had the following     No orders found for display         Where to get your medicines      These medications were sent to [facility] [address] [place] [address]  [address] [facility] [address]     Phone:  [phone]     sertraline 50 MG tablet          Primary Care Provider Office Phone # Fax #    Elida Cameron [phone] [address] [facility] [address]        Equal Access to Services     MITCHELL LEMON AH: Hadii aad ku hadasho Soomaali, waaxda luqadaha, qaybta kaalmada adeegyada, waxay [facility] garcia. So [facility] [phone].    ATENCIÓN: Si habla español, tiene a riggs disposición servicios gratuitos de asistencia lingüística. Llame al [phone].    We comply with applicable federal civil rights laws and [place] laws. We do not discriminate on the basis of race, color, national origin, age, disability, sex, sexual orientation, or gender identity.            Thank you!      Thank you for choosing [facility]  for your care. Our goal is always to provide you with excellent care. Hearing back from our patients is one way we can continue to improve our services. Please take a few minutes to complete the written survey that you may receive in the mail after your visit with us. Thank you!             Your Updated Medication List - Protect others around you: Learn how to safely use, store and throw away your medicines at www.disposemymeds.org.          This list is accurate as of 11/27/18 10:32 AM.  Always use your most recent med list.                   Brand Name Dispense Instructions for use Diagnosis    Ferrous Sulfate 27 MG Tabs      1 tab QD        metFORMIN 500 MG 24 hr tablet    GLUCOPHAGE-XR    180 tablet    Take 1 tablet (500 mg) by mouth 2 times daily (with meals)    Prediabetes, Vitamin D deficiency       omeprazole 40 MG DR capsule    priLOSEC          oxybutynin ER 10 MG 24 hr tablet    DITROPAN-XL     Take 10 mg by mouth        phentermine 37.5 MG tablet    ADIPEX-P    32 tablet    Take 1 tablet (37.5 mg) by mouth every morning (before breakfast)    Morbid obesity due to excess calories (H)       ranitidine 150 MG tablet    ZANTAC    60 tablet    Take 1 tablet (150 mg) by mouth 2 times daily    Gastroesophageal reflux disease, esophagitis presence not specified       sertraline 50 MG tablet    ZOLOFT    90 tablet    Take 1 tablet (50 mg) by mouth daily    PTSD (post-traumatic stress disorder), Adjustment disorder with mixed anxiety and depressed mood       topiramate 50 MG tablet    TOPAMAX    270 tablet    Take 1.5 tablets (75 mg) by mouth 2 times daily    Vitamin D deficiency, Prediabetes, Morbid obesity due to excess calories (H)       traZODone 150 MG tablet    DESYREL    90 tablet    Take 1 tablet (150 mg) by mouth nightly as needed for sleep    PTSD (post-traumatic stress disorder), Adjustment disorder with mixed anxiety and depressed mood, Adjustment  insomnia       vitamin D3 26755 units capsule    CHOLECALCIFEROL    12 capsule    Take 1 capsule (50,000 Units) by mouth once a week    Vitamin D deficiency          mammogram

## 2018-11-27 NOTE — LETTER
[facility]  [address] [place] [address]  Phone: [phone]    November 27, 2018        Keerthi Sloan  [address] [place] [address]          To whom it may concern:      RE: Keerthi Sloan      Patient was seen and treated today at our clinic today.      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  However, she continues to have anxiety surrounding her driving position and will likely pursue a non-driving position in the future when she is more financially stable.        Please contact me for questions or concerns.          Sincerely,              Elida Cameron, DO
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
## 2018-11-28 ASSESSMENT — ANXIETY QUESTIONNAIRES: GAD7 TOTAL SCORE: 7

## 2018-12-14 ENCOUNTER — PATIENT OUTREACH (OUTPATIENT)
Dept: NURSING | Facility: CLINIC | Age: 48
End: 2018-12-14

## 2018-12-14 PROCEDURE — 99207 ZZC HEALTH COACHING, NO CHARGE: CPT

## 2018-12-14 NOTE — PATIENT INSTRUCTIONS
December 14, 2018    [address] [address]  [phone]  Health Coaching Progress Note    Patient Name: Keerthi Sloan Date: December 14, 2018        Plan/Goal for the Next 4 Weeks:    GOAL #1: Continue to track steps with fit-bit and shoot for at least 3,000 steps per day  GOAL #2: Use stationary bike at home at least 2 times per week for 30 minutes each time  GOAL #3: Work on doing more regular daily walks with your dog  GOAL #4: Work on pre-planning ahead for healthy meals each week and buy groceries according to plan  GOAL #5: Prepare healthy evening meals with granddaughter at least 3 times per week  GOAL #6: Try more vegetarian options at home to cut back on meat intake during cleanse for next few weeks    Plan: (Homework, other):  Patient was encouraged to continue to seek condition-related information and education, as well as schedule a follow up appointment with the Health  in 4 weeks. Patient has set self-identified goals and will monitor progress until the next appointment.  Scheduled our next coaching appointment for Friday January 11th at 10am by phone.  Jacob Dominguez       Resources:

## 2018-12-14 NOTE — PROGRESS NOTES
December 14, 2018    Health Coaching Progress Note    Patient Name: Keerthi Sloan Date: December 14, 2018      Session Length: 30      DATA    PRM Master Survey Scores Reviewed: Yes    Core Healthy Days Survey:         YAQUELIN Score (Last Two) 7/24/2017 8/21/2018   YAQUELIN Raw Score 32 28   Activation Score 62.6 50   YAQUELIN Level 3 2       PHQ-2 Score 8/21/2018 2/2/2018   PHQ-2 Total Score (Adult) - Positive if 3 or more points; Administer PHQ-9 if positive 2 2   MyC PHQ-2 Score - -       PHQ-9 SCORE 11/27/2018 11/27/2018   PHQ-9 Total Score MyChart - -   PHQ-9 Total Score 13 13       Treatment Objective(s) Addressed in This Session:  Target Behavior(s): diet/weight loss and disease management/lifestyle changes of making sure to have healthy foods on hand/using food shelf options for temporary challenging financial time from being off work-using [employer] for help with food-hoping to get back on track with having healthy food on hand and pre-planning meals ahead of time, cooking at least 3 healthy meals each week with granddaughter, following meatless cleanse for 21 days to jumpstart weight loss,  making healthier dietary choices/not skipping meals/portion size, getting into an exercise routine again-walking the dog, using new stationary bike or treadmill in the space in her garage that she cleared out, making time for self/relaxation, tracking steps with fit-bit to shoot for 3,000 steps per day, weight loss and maintenance, accessing resources and attending appointments as scheduled-upcoming check-up with Dr. Cameron.      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 same garage now so that helps, niece passed  away unexpectedly, mom unexpectedly moved out-financial changes,  limited activity due to ongoing knee pain/got a stationary bike from her friend to use at home,  frustrated she gained a lot of weight lost back again, staying on track with her diet-not stress eating, finding her fit-bit charging cord, making time to walk her puppy      What Patient Does Well:   1) Patient is working hard to get back on track with a regular exercise routine and better eating habits-continues to learn what works and what doesn't  Previous Successes:   1) Patient still just trying to figure out a consistent routine that works and that she can stick with-states she started a 21 days cleanse without meat and is down 13lbs on her home scale!  Areas in Need of Improvement:   1) Activity level/exercise and consistency of exercise  2) Diet modification-eating less/healthier foods  3) Building a healthy routine and maintaining it  4) Weight loss and maintanence  Barriers to Change:   1) Adapting to new work schedule  2) Time-making time for self  3) Occasional knee pain/has been worse since driving bus again/weight gain  4) Trying to get back into a regular routine/regained a lot of weight she had previously lost  Reasons for Change:   1) Be healthier/move better-reduce strain on knees  2) Lose weight/avoid other health complications  3) Be around for grandkids/kids  Plan/Goal for the Next 4 Weeks:   Goals Addressed as of 12/14/2018 at 4:05 PM                 Today      Being Active (pt-stated)   50%    Added 12/14/18 by Jacob Dominguez     My Goal: I will continue to track steps daily with fit-bit and shoot for at least 3,000 steps per day    What I need to meet my goal: Fit-Bit/step tracking    I plan to meet my goal by this date: Next Coaching Session        Being Active (pt-stated)   20%    Added 12/14/18 by Jacob Dominguez     My Goal: I will use my stationary bike at home at least 2 times per week for 30 minutes each time    What I need to  meet my goal: Stationary bike/time    I plan to meet my goal by this date: Next Coaching Session        Being Active (pt-stated)   70%    Added 12/14/18 by Jacob Dominguez     My Goal: I will take my dog on walks each day to get additional steps    What I need to meet my goal: Making time for walks    I plan to meet my goal by this date: Next coaching session          Healthy Eating (pt-stated)   20%    Added 12/14/18 by Jacob Dominguez     My Goal: I will work on pre-planning ahead of time for healthy meals each week and buy groceries according to plan    What I need to meet my goal: plan/healthy groceries    I plan to meet my goal by this date: Next Coaching Session        Healthy Eating (pt-stated)   100%    Added 12/14/18 by Jacob Dominguez     My Goal: I will prepare healthy evening meals with granddaughter at least 3 times per week    What I need to meet my goal: Meal plan/recipes/cook at home    I plan to meet my goal by this date: Next Coaching Session        Healthy Eating (pt-stated)   0%    Added 12/14/18 by Jacob Dominguez     My Goal: I will try more vegetarian options at home to cut back on meat intake during cleanse for next few weeks    What I need to meet my goal: vegetarian recipes    I plan to meet my goal by this date: Next Coaching Session          Intervention:  Motivational Interviewing    MI Intervention: Expressed Empathy/Understanding, Supported Autonomy, Collaboration, Evocation, Permission to raise concern or advise, Open-ended questions, Reflections: simple and complex and Change talk (evoked)     Change Talk Expressed by the Patient: Desire to change Ability to change Reasons to change Need to change Committment to change Activation Taking steps    Provider Response to Change Talk: E - Evoked more info from patient about behavior change, A - Affirmed patient's thoughts, decisions, or attempts at behavior change, R - Reflected patient's change talk and S - Summarized patient's change talk  statements    Assessment / Progress on Treatment Objective(s) / Homework:    Achieved / completed to satisfaction - MAINTENANCE (Working to maintain change, with risk of relapse); Intervened by continuing to positively reinforce healthy behavior choice   Minimal progress - PREPARATION (Decided to change - considering how); Intervened by negotiating a change plan and determining options / strategies for behavior change, identifying triggers, exploring social supports, and working towards setting a date to begin behavior change  New Objective established this session - PREPARATION (Decided to change - considering how); Intervened by negotiating a change plan and determining options / strategies for behavior change, identifying triggers, exploring social supports, and working towards setting a date to begin behavior change  Satisfactory progress - ACTION (Actively working towards change); Intervened by reinforcing change plan / affirming steps taken     Plan: (Homework, other):  Patient was encouraged to continue to seek condition-related information and education, as well as schedule a follow up appointment with the Health  in 4 weeks. Patient has set self-identified goals and will monitor progress until the next appointment.  Scheduled our next coaching appointment for Friday January 11th at 10am by phone.  Jacob Dominguez Health    12/14/2018    4:06 PM

## 2018-12-17 ENCOUNTER — OFFICE VISIT (OUTPATIENT)
Dept: FAMILY MEDICINE | Facility: CLINIC | Age: 48
End: 2018-12-17
Payer: OTHER MISCELLANEOUS

## 2018-12-17 VITALS
DIASTOLIC BLOOD PRESSURE: 64 MMHG | SYSTOLIC BLOOD PRESSURE: 104 MMHG | WEIGHT: 290 LBS | OXYGEN SATURATION: 98 % | RESPIRATION RATE: 12 BRPM | BODY MASS INDEX: 45.42 KG/M2 | TEMPERATURE: 98.1 F | HEART RATE: 90 BPM

## 2018-12-17 DIAGNOSIS — F43.23 ADJUSTMENT DISORDER WITH MIXED ANXIETY AND DEPRESSED MOOD: ICD-10-CM

## 2018-12-17 DIAGNOSIS — F51.02 ADJUSTMENT INSOMNIA: ICD-10-CM

## 2018-12-17 DIAGNOSIS — Z02.6 ENCOUNTER RELATED TO WORKER'S COMPENSATION CLAIM: Primary | ICD-10-CM

## 2018-12-17 DIAGNOSIS — F43.10 PTSD (POST-TRAUMATIC STRESS DISORDER): ICD-10-CM

## 2018-12-17 PROCEDURE — 99213 OFFICE O/P EST LOW 20 MIN: CPT | Performed by: FAMILY MEDICINE
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
## 2019-01-11 ENCOUNTER — PATIENT OUTREACH (OUTPATIENT)
Dept: NURSING | Facility: CLINIC | Age: 49
End: 2019-01-11

## 2019-01-11 PROCEDURE — 99207 ZZC HEALTH COACHING, NO CHARGE: CPT

## 2019-01-11 NOTE — PROGRESS NOTES
January 11, 2019    Health Coaching Progress Note    Patient Name: Keerthi Sloan Date: January 11, 2019      Session Length: 30      DATA    PRM Master Survey Scores Reviewed: Yes    Core Healthy Days Survey:         YAQUELIN Score (Last Two) 7/24/2017 8/21/2018   YAQUELIN Raw Score 32 28   Activation Score 62.6 50   YAQUELIN Level 3 2       PHQ-2 Score 8/21/2018 2/2/2018   PHQ-2 Total Score (Adult) - Positive if 3 or more points; Administer PHQ-9 if positive 2 2   MyC PHQ-2 Score - -       PHQ-9 SCORE 11/27/2018 11/27/2018   PHQ-9 Total Score MyChart - -   PHQ-9 Total Score 13 13       Treatment Objective(s) Addressed in This Session:  Target Behavior(s): diet/weight loss and disease management/lifestyle changes of making sure to have healthy foods on hand and pre-planning meals ahead of time, cooking healthy meals with granddaughter 5 days per week, drinking more water-shooting for 100oz/day, making healthier dietary choices/not skipping meals/portion size, not eating as late at night, getting into an exercise routine again-walking the dog, using stationary bike or treadmill in the space in her garage that she cleared out 2 days/week, making time for self/relaxation, tracking steps with fit-bit to shoot for 3,000 steps per day, weight loss and maintenance, accessing resources and attending appointments as scheduled-check-up with Dr. Cameron.      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 same garage now so that helps, niece passed away unexpectedly, mom unexpectedly moved out-financial changes, limited activity due to ongoing  knee pain/got a stationary bike from her friend to use at home, frustrated she gained a lot of weight lost back again, staying on track with her diet-not stress eating, finding her fit-bit log-in, making time to walk her puppy      What Patient Does Well:   1) Patient is working hard to get back on track with a regular exercise routine and better eating habits-continues to learn what works and what doesn't  Previous Successes:   1) Patient was down 11 lbs from her heaviest weight at her last weigh-in and reports an additional loss of 9 more lbs on home scale for a total of 20lbs lost!  Areas in Need of Improvement:   1) Activity level/exercise and consistency of exercise  2) Diet modification-eating less/healthier foods  3) Building a healthy routine and maintaining it  4) Weight loss and maintanence  Barriers to Change:   1) Adapting to new work schedule  2) Time-making time for self  3) Occasional knee pain/has been worse since driving bus again/weight gain  4) Trying to get back into a regular routine/regained a lot of weight she had previously lost  Reasons for Change:   1) Be healthier/move better-reduce strain on knees  2) Lose weight/avoid other health complications  3) Be around for grandkids/kids  Plan/Goal for the Next 4 Weeks:   Goals Addressed as of 1/11/2019 at 3:27 PM                 Today    12/14/18      Being Active (pt-stated)   70%  50%    Added 12/14/18 by Jacob Dominguez     My Goal: I will continue to track steps daily with fit-bit and shoot for at least 3,000 steps per day    What I need to meet my goal: Fit-Bit/step tracking    I plan to meet my goal by this date: Next Coaching Session        Being Active (pt-stated)   30%  20%    Added 12/14/18 by Jacob Dominguez     My Goal: I will use my stationary bike at home at least 2 times per week for 30 minutes each time    What I need to meet my goal: Stationary bike/time    I plan to meet my goal by this date: Next Coaching Session        Being Active  (pt-stated)   70%  70%    Added 12/14/18 by Jacob Dominguez     My Goal: I will take my dog on walks each day to get additional steps    What I need to meet my goal: Making time for walks    I plan to meet my goal by this date: Next coaching session          Healthy Eating (pt-stated)   70%  20%    Added 12/14/18 by Jacob Dominguez     My Goal: I will work on pre-planning ahead of time for healthy meals each week and buy groceries according to plan    What I need to meet my goal: plan/healthy groceries    I plan to meet my goal by this date: Next Coaching Session        Healthy Eating (pt-stated)   100% Completed!  100%    Added 12/14/18 by Jacob Dominguez     My Goal: I will prepare healthy evening meals with granddaughter at least 3 times per week    What I need to meet my goal: Meal plan/recipes/cook at home    I plan to meet my goal by this date: Next Coaching Session        Healthy Eating (pt-stated)   100%  0%    Added 12/14/18 by Jacob Dominguez     My Goal: I will try more vegetarian options at home to cut back on meat intake during cleanse for next few weeks    What I need to meet my goal: vegetarian recipes    I plan to meet my goal by this date: Next Coaching Session        Healthy Eating (pt-stated)   0% New       Added 1/11/19 by Jacob Dominguez     My Goal: I will prepare healthy evening meals with granddaughter at least 5 days per week    What I need to meet my goal: Plan/meal ideas      I plan to meet my goal by this date: Next Coaching Session        Water (pt-stated)   0% New       Added 1/11/19 by Jacob Dominguez     My Goal: I will drink more water daily and shoot for at least six 16oz bottles per day    What I need to meet my goal: Water bottle/awareness    I plan to meet my goal by this date: Next Coaching Session          Intervention:  Motivational Interviewing    MI Intervention: Expressed Empathy/Understanding, Supported Autonomy, Collaboration, Evocation, Permission to raise concern or advise,  Open-ended questions, Reflections: simple and complex and Change talk (evoked)     Change Talk Expressed by the Patient: Desire to change Ability to change Reasons to change Need to change Committment to change Activation Taking steps    Provider Response to Change Talk: E - Evoked more info from patient about behavior change, A - Affirmed patient's thoughts, decisions, or attempts at behavior change, R - Reflected patient's change talk and S - Summarized patient's change talk statements    Assessment / Progress on Treatment Objective(s) / Homework:    Achieved / completed to satisfaction - MAINTENANCE (Working to maintain change, with risk of relapse); Intervened by continuing to positively reinforce healthy behavior choice   New Objective established this session - PREPARATION (Decided to change - considering how); Intervened by negotiating a change plan and determining options / strategies for behavior change, identifying triggers, exploring social supports, and working towards setting a date to begin behavior change  Satisfactory progress - ACTION (Actively working towards change); Intervened by reinforcing change plan / affirming steps taken     Plan: (Homework, other):  Patient was encouraged to continue to seek condition-related information and education, as well as schedule a follow up appointment with the Health  in 4 weeks. Patient has set self-identified goals and will monitor progress until the next appointment.  Scheduled our next coaching session for Friday February 8th at 10am.      [facility]    1/11/2019    3:27 PM

## 2019-01-18 ENCOUNTER — OFFICE VISIT (OUTPATIENT)
Dept: FAMILY MEDICINE | Facility: CLINIC | Age: 49
End: 2019-01-18
Payer: OTHER MISCELLANEOUS

## 2019-01-18 VITALS
DIASTOLIC BLOOD PRESSURE: 76 MMHG | SYSTOLIC BLOOD PRESSURE: 134 MMHG | TEMPERATURE: 97.9 F | WEIGHT: 286.38 LBS | HEART RATE: 84 BPM | HEIGHT: 67 IN | BODY MASS INDEX: 44.95 KG/M2

## 2019-01-18 DIAGNOSIS — F43.10 PTSD (POST-TRAUMATIC STRESS DISORDER): ICD-10-CM

## 2019-01-18 DIAGNOSIS — F43.23 ADJUSTMENT DISORDER WITH MIXED ANXIETY AND DEPRESSED MOOD: ICD-10-CM

## 2019-01-18 DIAGNOSIS — F51.02 ADJUSTMENT INSOMNIA: ICD-10-CM

## 2019-01-18 DIAGNOSIS — Z02.6 ENCOUNTER RELATED TO WORKER'S COMPENSATION CLAIM: Primary | ICD-10-CM

## 2019-01-18 PROCEDURE — 99213 OFFICE O/P EST LOW 20 MIN: CPT | Performed by: FAMILY MEDICINE

## 2019-01-18 ASSESSMENT — ANXIETY QUESTIONNAIRES
7. FEELING AFRAID AS IF SOMETHING AWFUL MIGHT HAPPEN: SEVERAL DAYS
5. BEING SO RESTLESS THAT IT IS HARD TO SIT STILL: MORE THAN HALF THE DAYS
2. NOT BEING ABLE TO STOP OR CONTROL WORRYING: MORE THAN HALF THE DAYS
6. BECOMING EASILY ANNOYED OR IRRITABLE: SEVERAL DAYS
IF YOU CHECKED OFF ANY PROBLEMS ON THIS QUESTIONNAIRE, HOW DIFFICULT HAVE THESE PROBLEMS MADE IT FOR YOU TO DO YOUR WORK, TAKE CARE OF THINGS AT HOME, OR GET ALONG WITH OTHER PEOPLE: SOMEWHAT DIFFICULT
3. WORRYING TOO MUCH ABOUT DIFFERENT THINGS: MORE THAN HALF THE DAYS
1. FEELING NERVOUS, ANXIOUS, OR ON EDGE: MORE THAN HALF THE DAYS
GAD7 TOTAL SCORE: 12

## 2019-01-18 ASSESSMENT — MIFFLIN-ST. JEOR: SCORE: 1971.62

## 2019-01-18 ASSESSMENT — PATIENT HEALTH QUESTIONNAIRE - PHQ9
5. POOR APPETITE OR OVEREATING: MORE THAN HALF THE DAYS
SUM OF ALL RESPONSES TO PHQ QUESTIONS 1-9: 10
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
## 2019-01-18 NOTE — LETTER
[facility]  [address] [place] [id number]  Phone: [phone]    January 18, 2019        Keerthi Sloan  [address] [place] [id number]          To whom it may concern:     RE: Keerthi Sloan        Patient was seen and treated today at our clinic today.      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  She should follow up with me in 4-6 weeks.        Please contact me for questions or concerns.          Sincerely,              Elida Cameron, DO

## 2019-01-18 NOTE — PATIENT INSTRUCTIONS
[facility]     Discharged by : Christina Watkins CMA    If you have any questions regarding your visit please contact your care team:     Team Gold                Clinic Hours Telephone Number     Dr. Anil Rojas, CNP   7am-7pm  Monday - Thursday   7am-5pm  Fridays  [phone]   (Appointment scheduling available 24/7)     RN Line  [phone] option 2     Urgent Care - [place] and [place] - 11am-9pm Monday-Friday Saturday-Sunday- 9am-5pm     [place] -   5pm-9pm Monday-Friday Saturday-Sunday- 9am-5pm    [phone] - [place]    [phone] - [place]     For a Price Quote for your services, please call our [employer] Price Line at [phone].     What options do I have for visits at the clinic other than the traditional office visit?     To expand how we care for you, many of our providers are utilizing electronic visits (e-visits) and telephone visits, when medically appropriate, for interactions with their patients rather than a visit in the clinic. We also offer nurse visits for many medical concerns. Just like any other service, we will bill your insurance company for this type of visit based on time spent on the phone with your provider. Not all insurance companies cover these visits. Please check with your medical insurance if this type of visit is covered. You will be responsible for any charges that are not paid by your insurance.     E-visits via [employer]: generally incur a $35.00 fee.     Telephone visits:  Time spent on the phone: *charged based on time that is spent on the phone in increments of 10 minutes. Estimated cost:   5-10 mins $30.00   11-20 mins. $59.00   21-30 mins. $85.00       Use [employer] (secure email communication and access to your chart) to send your primary care provider a message or make an appointment. Ask someone on your Team how to sign up for [employer].     As always, Thank you for trusting us  with your health care needs!      [facility]:    Scheduling Appointments  [facility]  Call: [phone]    [facility], [facility]  Call: [phone]    [facility]  Call: [phone]    For Gastroenterology referrals   [facility]   Clinics and Surgery Center, 4th Floor   [address] [place] [address]   Appointments: [phone]    WHERE TO GO FOR CARE?    Clinic    Make an appointment if you:       Are sick (cold, cough, flu, sore throat, earache or in pain).       Have a small injury (sprain, small cut, burn or broken bone).       Need a physical exam, Pap smear, vaccine or prescription refill.       Have questions about your health or medicines.    To reach us:      Call [phone] ([phone]). Open 24 hours every day. (For counseling services, call [phone].)    Log into Social Tools at [employer]. (Visit [email or web address] to create an account.) Hospital emergency room    An emergency is a serious or life- threatening problem that must be treated right away.    Call 024 or get to the hospital if you have:      Very bad or sudden:            - Chest pain or pressure         - Bleeding         - Head or belly pain         - Dizziness or trouble seeing, walking or                          Speaking      Problems breathing      Blood in your vomit or you are coughing up blood      A major injury (knocked out, loss of a finger or limb, rape, broken bone protruding from skin)    A mental health crisis. (Or call the Mental Health Crisis line at 1-649.129.8891 or Suicide Prevention Hotline at 1-937.506.4741.)    Open 24 hours every day. You don't need an appointment.     Urgent care    Visit urgent care for sickness or small injuries when the clinic is closed. You don't need an appointment. To check hours or find an urgent care near you, visit [email or web address]. Online care    Get online  care from [facility] for more than 70 common problems, like colds, allergies and infections. Open 24 hours every day at:   [email or web address]   Need help deciding?    For advice about where to be seen, you may call your clinic and ask to speak with a nurse. We're here for you 24 hours every day.         If you are deaf or hard of hearing, please let us know. We provide many free services including sign language interpreters, oral interpreters, TTYs, telephone amplifiers, note takers and written materials.

## 2019-01-19 ASSESSMENT — ANXIETY QUESTIONNAIRES: GAD7 TOTAL SCORE: 12

## 2019-02-25 ENCOUNTER — PATIENT OUTREACH (OUTPATIENT)
Dept: NURSING | Facility: CLINIC | Age: 49
End: 2019-02-25

## 2019-02-25 NOTE — PROGRESS NOTES
Outreached patient to follow-up on No Show of last scheduled appointment, but wasn't able to connect.  Left message for call back.    Jacob Dominguez, Health    2/25/2019    4:38 PM

## 2019-03-04 ENCOUNTER — PATIENT OUTREACH (OUTPATIENT)
Dept: NURSING | Facility: CLINIC | Age: 49
End: 2019-03-04

## 2019-03-04 NOTE — PROGRESS NOTES
Outreached patient for second time to follow-up on No Show of our last scheduled Health Coaching appointment, and was able to connect. Patient agrees to reschedule coaching call for 3/11/2019.    Jacob Dominguez, Health    3/4/2019    11:19 AM

## 2019-03-06 ENCOUNTER — OFFICE VISIT (OUTPATIENT)
Dept: FAMILY MEDICINE | Facility: CLINIC | Age: 49
End: 2019-03-06
Payer: OTHER MISCELLANEOUS

## 2019-03-06 ENCOUNTER — TELEPHONE (OUTPATIENT)
Dept: FAMILY MEDICINE | Facility: CLINIC | Age: 49
End: 2019-03-06

## 2019-03-06 VITALS
OXYGEN SATURATION: 97 % | DIASTOLIC BLOOD PRESSURE: 76 MMHG | WEIGHT: 290.8 LBS | HEART RATE: 76 BPM | SYSTOLIC BLOOD PRESSURE: 140 MMHG | BODY MASS INDEX: 45.64 KG/M2 | HEIGHT: 67 IN | TEMPERATURE: 98 F | RESPIRATION RATE: 16 BRPM

## 2019-03-06 DIAGNOSIS — F43.23 ADJUSTMENT DISORDER WITH MIXED ANXIETY AND DEPRESSED MOOD: ICD-10-CM

## 2019-03-06 DIAGNOSIS — F51.02 ADJUSTMENT INSOMNIA: ICD-10-CM

## 2019-03-06 DIAGNOSIS — Z02.6 ENCOUNTER RELATED TO WORKER'S COMPENSATION CLAIM: Primary | ICD-10-CM

## 2019-03-06 DIAGNOSIS — F43.10 PTSD (POST-TRAUMATIC STRESS DISORDER): ICD-10-CM

## 2019-03-06 PROCEDURE — 99213 OFFICE O/P EST LOW 20 MIN: CPT | Performed by: FAMILY MEDICINE

## 2019-03-06 ASSESSMENT — PATIENT HEALTH QUESTIONNAIRE - PHQ9
5. POOR APPETITE OR OVEREATING: NEARLY EVERY DAY
SUM OF ALL RESPONSES TO PHQ QUESTIONS 1-9: 25

## 2019-03-06 ASSESSMENT — ANXIETY QUESTIONNAIRES
7. FEELING AFRAID AS IF SOMETHING AWFUL MIGHT HAPPEN: NEARLY EVERY DAY
GAD7 TOTAL SCORE: 21
2. NOT BEING ABLE TO STOP OR CONTROL WORRYING: NEARLY EVERY DAY
6. BECOMING EASILY ANNOYED OR IRRITABLE: NEARLY EVERY DAY
3. WORRYING TOO MUCH ABOUT DIFFERENT THINGS: NEARLY EVERY DAY
1. FEELING NERVOUS, ANXIOUS, OR ON EDGE: NEARLY EVERY DAY
5. BEING SO RESTLESS THAT IT IS HARD TO SIT STILL: NEARLY EVERY DAY
IF YOU CHECKED OFF ANY PROBLEMS ON THIS QUESTIONNAIRE, HOW DIFFICULT HAVE THESE PROBLEMS MADE IT FOR YOU TO DO YOUR WORK, TAKE CARE OF THINGS AT HOME, OR GET ALONG WITH OTHER PEOPLE: EXTREMELY DIFFICULT

## 2019-03-06 ASSESSMENT — PAIN SCALES - GENERAL: PAINLEVEL: NO PAIN (0)

## 2019-03-06 ASSESSMENT — MIFFLIN-ST. JEOR: SCORE: 1986.69
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
## 2019-03-06 NOTE — PATIENT INSTRUCTIONS
"  Patient Education     Recognizing Suicide Warning Signs in Yourself  People who are thinking about suicide may not know they are depressed. Certain thoughts, feelings, and actions can be signals that let you know you may need help. The best thing you can do is watch for signs that you may be at risk. Then, ask for help. You can talk to your regular healthcare provider or seek help from a mental health provider.  Depression  Depression is a treatable illness. To know if depression is causing you to feel like ending your life, ask yourself:    Do I feel worthless, guilty, helpless, or hopeless?    Have I been feeling sad, down, or blue on most days?    Have I lost interest in my work or people I used to enjoy?    Do I have trouble sleeping or do I sleep too much?    Do I eat more or less than usual?    Do I feel tired, weak, and low on energy?    Do I feel restless and unable to sit still?    Do I have trouble thinking or making choices?    Do I cry more than usual?    Do I feel life isn't worth living?  Warning signs for suicide    Thinking often about taking your life    Planning how you may attempt it    Talking or writing about committing suicide    Feeling that death is the only solution to your problems    Feeling a pressing need to make out your will or arrange your     Giving away things you own    Participating in risky behaviors, such as sex with someone you don't know or drinking and driving    Buying a lethal weapon, such as a gun, or hoarding medicines that could be used in an over dose  If you notice any of these warning signs, call for help right away or go to your closest hospital emergency department. You can also call a mental health clinic or a 24-hour suicide crisis hotline for help and support. Search for local suicide prevention resources on your computer or look for the number in the white pages of your phone book under \"Suicide.\" In an emergency, if you are in immediate risk of harming " yourself, call 911. For more information about depression:    National [place] of Mental Jkhnww662-714-8679xff.[facility].nih.gov    National Suicide Prevention Ktuihwji990-326-6365 (821-809-UWNM)www.suicidepreventionlifeline.org    National [place] on Mental Rirgpxq264-001-5559aoh.[place].org    Mental Health Xfyipgg855-132-5224grk.[facility].org    National Suicide Wbzrqfc038-629-2349 (800-SUICIDE)   Date Last Reviewed: 1/1/2017 2000-2018 [employer]. [address] [place] [address]. All rights reserved. This information is not intended as a substitute for professional medical care. Always follow your healthcare professional's instructions.

## 2019-03-06 NOTE — TELEPHONE ENCOUNTER
Reason for Call:  Other     Detailed comments: patient called and would like to change some of her work restrictions she was seen today in clinic by PCP please call to discuss     Phone Number Patient can be reached at: Home number on file [phone] (home)    Best Time: any    Can we leave a detailed message on this number? YES    Call taken on 3/6/2019 at 3:08 PM by Deneen Townsend

## 2019-03-07 ASSESSMENT — ANXIETY QUESTIONNAIRES: GAD7 TOTAL SCORE: 21

## 2019-03-07 NOTE — TELEPHONE ENCOUNTER
Patient called to discuss her worker's comp restrictions.  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  Her job was unable to provide desk duties and suggested intermediate work instead and asked for forms to be filled out for workability.  Patient and I agree that workability forms aren't necessarily relevant in her case since the issue isn't physical, but instead a mental disability.  Agree with pt to change her work restrictions to be completley off work for 2 weeks and then will re-evaluate at her next visit.      Patient will try to access the letter via [employer].  If she cannot access it she will call with fax numbers for us to send the letters to.

## 2019-03-08 NOTE — TELEPHONE ENCOUNTER
[facility] is [facility], she would like it emailed to her at [email or web address]    Thank you,  Helen MAGANA    [facility] Team Julita

## 2019-03-11 ENCOUNTER — PATIENT OUTREACH (OUTPATIENT)
Dept: NURSING | Facility: CLINIC | Age: 49
End: 2019-03-11

## 2019-03-11 PROCEDURE — 99207 ZZC HEALTH COACHING, NO CHARGE: CPT

## 2019-03-11 NOTE — PROGRESS NOTES
March 11, 2019    Health Coaching Progress Note    Patient Name: Keerthi Sloan Date: March 11, 2019      Session Length: 30      DATA    PRM Master Survey Scores Reviewed: Yes    Core Healthy Days Survey:         YAQUELIN Score (Last Two) 7/24/2017 8/21/2018   YAQUELIN Raw Score 32 28   Activation Score 62.6 50   YAQUELIN Level 3 2       PHQ-2 Score 3/6/2019 8/21/2018   PHQ-2 Total Score (Adult) - Positive if 3 or more points; Administer PHQ-9 if positive 4 2   MyC PHQ-2 Score - -       PHQ-9 SCORE 1/18/2019 3/6/2019   PHQ-9 Total Score MyChart - -   PHQ-9 Total Score 10 25       Treatment Objective(s) Addressed in This Session:  Target Behavior(s): diet/weight loss and disease management/lifestyle changes of making sure to have healthy foods on hand and pre-planning meals ahead of time, cooking healthy meals with granddaughter 5 days per week, drinking more water-shooting for 100oz/day, making healthier dietary choices/not skipping meals/portion size, not eating as late at night, getting into an exercise routine again-walking the dog, using stationary bike or treadmill in the space in her garage that she cleared out 2 days/week, making time for self/relaxation, tracking steps with fit-bit to shoot for 3,000 steps per day, weight loss and maintenance, accessing resources and attending appointments as scheduled.      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-going okay as her sister works at the same garage now so that helps, niece passed away unexpectedly, mom unexpectedly moved out-financial  changes, limited activity due to ongoing knee pain/got a stationary bike from her friend to use at home, frustrated she gained a lot of weight lost back again, staying on track with her diet-not stress eating, finding her fit-bit log-in, making time to walk her puppy      What Patient Does Well:   1) Patient is working hard to get back on track with a regular exercise routine and better eating habits-continues to learn what works and what doesn't  Previous Successes:   1) Patient is still down 12lbs from her heaviest weight at her last weigh-in at the clinic, but has been doing more emotional eating lately.  Areas in Need of Improvement:   1) Activity level/exercise and consistency of exercise  2) Diet modification-eating less/healthier foods  3) Building a healthy routine and maintaining it  4) Weight loss and maintanence  Barriers to Change:   1) Adapting to new work schedule  2) Time-making time for self  3) Occasional knee pain/has been worse since driving bus again/weight gain  4) Trying to get back into a regular routine/regained a lot of weight she had previously lost  Reasons for Change:   1) Be healthier/move better-reduce strain on knees  2) Lose weight/avoid other health complications  3) Be around for grandkids/kids  Plan/Goal for the Next 4 Weeks:   Goals Addressed as of 3/11/2019 at 1:15 PM                 Today    1/11/19      Being Active (pt-stated)   80%  70%    Added 12/14/18 by Jacob Dominguez     My Goal: I will continue to track steps daily with fit-bit and shoot for at least 3,000 steps per day    What I need to meet my goal: Fit-Bit/step tracking    I plan to meet my goal by this date: Next Coaching Session        Being Active (pt-stated)   30%  30%    Added 12/14/18 by Jacob Dominguez     My Goal: I will use my stationary bike at home at least 2 times per week for 30 minutes each time    What I need to meet my goal: Stationary bike/time    I plan to meet my goal by this date: Next Coaching  Session        Being Active (pt-stated)   80%  70%    Added 12/14/18 by Jacob Dominguez     My Goal: I will take my dog on walks each day to get additional steps    What I need to meet my goal: Making time for walks    I plan to meet my goal by this date: Next coaching session          Healthy Eating (pt-stated)   70%  70%    Added 12/14/18 by Jacob Dominguez     My Goal: I will work on pre-planning ahead of time for healthy meals each week and buy groceries according to plan    What I need to meet my goal: plan/healthy groceries    I plan to meet my goal by this date: Next Coaching Session        Healthy Eating (pt-stated)   100%  100%    Added 12/14/18 by Jacob Dominguez     My Goal: I will try more vegetarian options at home to cut back on meat intake during cleanse for next few weeks    What I need to meet my goal: vegetarian recipes    I plan to meet my goal by this date: Next Coaching Session        Healthy Eating (pt-stated)   50%  0%    Added 1/11/19 by Jacob Dominguez     My Goal: I will prepare healthy evening meals with granddaughter at least 5 days per week    What I need to meet my goal: Plan/meal ideas      I plan to meet my goal by this date: Next Coaching Session        Water (pt-stated)   50%  10%    Added 1/11/19 by Jacob Dominguez     My Goal: I will drink more water daily and shoot for at least six 16oz bottles per day    What I need to meet my goal: Water bottle/awareness    I plan to meet my goal by this date: Next Coaching Session          Intervention:  Motivational Interviewing    MI Intervention: Expressed Empathy/Understanding, Supported Autonomy, Collaboration, Evocation, Permission to raise concern or advise, Open-ended questions, Reflections: simple and complex and Change talk (evoked)     Change Talk Expressed by the Patient: Desire to change Ability to change Reasons to change Need to change Committment to change Activation Taking steps    Provider Response to Change Talk: E - Evoked more info  from patient about behavior change, A - Affirmed patient's thoughts, decisions, or attempts at behavior change, R - Reflected patient's change talk and S - Summarized patient's change talk statements    Assessment / Progress on Treatment Objective(s) / Homework:    Minimal progress - PREPARATION (Decided to change - considering how); Intervened by negotiating a change plan and determining options / strategies for behavior change, identifying triggers, exploring social supports, and working towards setting a date to begin behavior change  Satisfactory progress - ACTION (Actively working towards change); Intervened by reinforcing change plan / affirming steps taken     Plan: (Homework, other):  Patient was encouraged to continue to seek condition-related information and education, as well as schedule a follow up appointment with the Health  in 4 weeks. Patient has set self-identified goals and will monitor progress until the next appointment.  Patient states she will call back to reschedule our next coaching call in early April.      Jacob Dominguez Health    3/11/2019    1:20 PM

## 2019-03-13 ENCOUNTER — TELEPHONE (OUTPATIENT)
Dept: FAMILY MEDICINE | Facility: CLINIC | Age: 49
End: 2019-03-13

## 2019-03-13 NOTE — TELEPHONE ENCOUNTER
Pt has a current rx for Vitamin D3 50,000 the insurance will not cover D3 but will cover D2.  Would you like to change the medication or would you like to allow her to pay cash?  Since she has state insurance you would need to try for the PA and get a denial then the pt would have to pay cash before she could pay cash for the D3.    Thanks!    Faye Shah, Pharmacy [facility]  [phone]

## 2019-03-15 NOTE — TELEPHONE ENCOUNTER
Spoke with patient.  All recommendations given.  Patient states she is going to pay out of pocket for the D3- 50,000.  Gabby Cortez CMA on 3/15/2019 at 3:15 PM

## 2019-03-15 NOTE — TELEPHONE ENCOUNTER
Please call and let Veronica know her insurance will not cover the D3.  Our options are:  #1 try changing back to D2 50,000 units weekly.  #2. She can pay out of pocket for over the counter D3 and take a 5000 unit tablet everyday.  #3. She can pay out of pocket for the D3 50,000 unit capsules once weekly - she'd have to check with pharmacy on the cost.    Please let me know what she prefers.  Thanks,  Janet Castaneda NP  Endocrinology

## 2019-03-18 DIAGNOSIS — E66.01 MORBID OBESITY DUE TO EXCESS CALORIES (H): ICD-10-CM

## 2019-03-18 RX ORDER — PHENTERMINE HYDROCHLORIDE 37.5 MG/1
37.5 TABLET ORAL
Qty: 32 TABLET | Refills: 2 | Status: CANCELLED | OUTPATIENT
Start: 2019-03-18

## 2019-03-18 NOTE — TELEPHONE ENCOUNTER
Pt needs appointment for refill, prescribed by Janet Castaneda last fall, message return in 3 months, pharmacy informed needs appointment, denied, called pt, informed, agrees to make appointment     Last office visit: 8/2018- with prescribing provider:       Future Office Visit:   Next 5 appointments (look out 90 days)    Mar 19, 2019 10:40 AM CDT  SHORT with Elida Cameron,   [facility]) [address] [place] [id number] [phone]   Apr 08, 2019  9:00 AM CDT  Telephone Visit with Jacob Dominguez  [facility] ([facility]) [address] [place] [id number] [phone]         Kristen Vargas RN, BSN  Message handled by Nurse Triage.

## 2019-03-19 ENCOUNTER — OFFICE VISIT (OUTPATIENT)
Dept: FAMILY MEDICINE | Facility: CLINIC | Age: 49
End: 2019-03-19
Payer: OTHER MISCELLANEOUS

## 2019-03-19 VITALS
DIASTOLIC BLOOD PRESSURE: 80 MMHG | HEART RATE: 72 BPM | BODY MASS INDEX: 45.83 KG/M2 | TEMPERATURE: 98 F | WEIGHT: 292 LBS | SYSTOLIC BLOOD PRESSURE: 142 MMHG | HEIGHT: 67 IN

## 2019-03-19 DIAGNOSIS — F51.02 ADJUSTMENT INSOMNIA: ICD-10-CM

## 2019-03-19 DIAGNOSIS — F43.23 ADJUSTMENT DISORDER WITH MIXED ANXIETY AND DEPRESSED MOOD: ICD-10-CM

## 2019-03-19 DIAGNOSIS — F43.10 PTSD (POST-TRAUMATIC STRESS DISORDER): ICD-10-CM

## 2019-03-19 DIAGNOSIS — Z02.6 ENCOUNTER RELATED TO WORKER'S COMPENSATION CLAIM: Primary | ICD-10-CM

## 2019-03-19 PROCEDURE — 99213 OFFICE O/P EST LOW 20 MIN: CPT | Performed by: FAMILY MEDICINE

## 2019-03-19 RX ORDER — TRAZODONE HYDROCHLORIDE 150 MG/1
150 TABLET ORAL
Qty: 90 TABLET | Refills: 1 | Status: SHIPPED | OUTPATIENT
Start: 2019-03-19 | End: 2020-06-11

## 2019-03-19 ASSESSMENT — MIFFLIN-ST. JEOR: SCORE: 1992.13
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
## 2019-03-21 NOTE — TELEPHONE ENCOUNTER
Pt calls to ask if Janet override refill now that is scheduled May 17.  Informed refill denied.  Last OV 8/8/18  Follow-up:  3 months   Janet Castaneda NP  Last dispensed   Dispense date: 12/14/2018  Qty: 32.00 tablet                 Pt informed did not keep scheduled visit with Janet 11/8/18. (no-show)  Informed no refills until visit.   Guillermo Simmons RN

## 2019-03-22 ENCOUNTER — TELEPHONE (OUTPATIENT)
Dept: FAMILY MEDICINE | Facility: CLINIC | Age: 49
End: 2019-03-22

## 2019-03-22 NOTE — TELEPHONE ENCOUNTER
Reason for Call:  Other     Detailed comments: Patient has questions regarding work restrictions.    Phone Number Patient can be reached at: Home number on file [phone] (home)    Best Time:     Can we leave a detailed message on this number? Not Applicable    Call taken on 3/22/2019 at 10:46 AM by Christal Abarca
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
## 2019-03-26 NOTE — TELEPHONE ENCOUNTER
TC-    Please print new work restrictions letter from 3/22 written by Dr. Cameron and send via patient's email: [email or web address]    Thank you,  Mimi Doyle RN

## 2019-03-26 NOTE — TELEPHONE ENCOUNTER
Letter printed and emailed to patient as requested.    Thank you,  Helen MAGANA    NE Team [place]

## 2019-03-26 NOTE — TELEPHONE ENCOUNTER
Pt called and stated she is not able to print the updated letter off [employer]. It doesn't look like it was sent via [employer]. Can you please confirm.  Debby Bello  Team 3 Coordinator

## 2019-04-03 ENCOUNTER — OFFICE VISIT (OUTPATIENT)
Dept: FAMILY MEDICINE | Facility: CLINIC | Age: 49
End: 2019-04-03
Payer: OTHER MISCELLANEOUS

## 2019-04-03 VITALS
HEIGHT: 67 IN | BODY MASS INDEX: 44.73 KG/M2 | TEMPERATURE: 98 F | WEIGHT: 285 LBS | SYSTOLIC BLOOD PRESSURE: 116 MMHG | HEART RATE: 80 BPM | DIASTOLIC BLOOD PRESSURE: 74 MMHG

## 2019-04-03 DIAGNOSIS — F43.23 ADJUSTMENT DISORDER WITH MIXED ANXIETY AND DEPRESSED MOOD: ICD-10-CM

## 2019-04-03 DIAGNOSIS — Z02.6 ENCOUNTER RELATED TO WORKER'S COMPENSATION CLAIM: Primary | ICD-10-CM

## 2019-04-03 DIAGNOSIS — F43.10 PTSD (POST-TRAUMATIC STRESS DISORDER): ICD-10-CM

## 2019-04-03 DIAGNOSIS — F51.02 ADJUSTMENT INSOMNIA: ICD-10-CM

## 2019-04-03 PROCEDURE — 99213 OFFICE O/P EST LOW 20 MIN: CPT | Performed by: FAMILY MEDICINE

## 2019-04-03 RX ORDER — SERTRALINE HYDROCHLORIDE 100 MG/1
100 TABLET, FILM COATED ORAL DAILY
Qty: 90 TABLET | Refills: 1 | Status: SHIPPED | OUTPATIENT
Start: 2019-04-03 | End: 2019-04-17

## 2019-04-03 ASSESSMENT — ANXIETY QUESTIONNAIRES
5. BEING SO RESTLESS THAT IT IS HARD TO SIT STILL: NEARLY EVERY DAY
1. FEELING NERVOUS, ANXIOUS, OR ON EDGE: NEARLY EVERY DAY
IF YOU CHECKED OFF ANY PROBLEMS ON THIS QUESTIONNAIRE, HOW DIFFICULT HAVE THESE PROBLEMS MADE IT FOR YOU TO DO YOUR WORK, TAKE CARE OF THINGS AT HOME, OR GET ALONG WITH OTHER PEOPLE: EXTREMELY DIFFICULT
6. BECOMING EASILY ANNOYED OR IRRITABLE: MORE THAN HALF THE DAYS
2. NOT BEING ABLE TO STOP OR CONTROL WORRYING: NEARLY EVERY DAY
3. WORRYING TOO MUCH ABOUT DIFFERENT THINGS: NEARLY EVERY DAY
7. FEELING AFRAID AS IF SOMETHING AWFUL MIGHT HAPPEN: MORE THAN HALF THE DAYS
GAD7 TOTAL SCORE: 19

## 2019-04-03 ASSESSMENT — PATIENT HEALTH QUESTIONNAIRE - PHQ9
SUM OF ALL RESPONSES TO PHQ QUESTIONS 1-9: 19
5. POOR APPETITE OR OVEREATING: NEARLY EVERY DAY

## 2019-04-03 ASSESSMENT — MIFFLIN-ST. JEOR: SCORE: 1960.38
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
## 2019-04-04 ASSESSMENT — ANXIETY QUESTIONNAIRES: GAD7 TOTAL SCORE: 19

## 2019-04-09 ENCOUNTER — PATIENT OUTREACH (OUTPATIENT)
Dept: NURSING | Facility: CLINIC | Age: 49
End: 2019-04-09

## 2019-04-17 ENCOUNTER — OFFICE VISIT (OUTPATIENT)
Dept: FAMILY MEDICINE | Facility: CLINIC | Age: 49
End: 2019-04-17
Payer: OTHER MISCELLANEOUS

## 2019-04-17 VITALS
DIASTOLIC BLOOD PRESSURE: 80 MMHG | BODY MASS INDEX: 45.36 KG/M2 | HEIGHT: 67 IN | WEIGHT: 289 LBS | HEART RATE: 76 BPM | TEMPERATURE: 97.8 F | SYSTOLIC BLOOD PRESSURE: 132 MMHG

## 2019-04-17 DIAGNOSIS — F51.02 ADJUSTMENT INSOMNIA: ICD-10-CM

## 2019-04-17 DIAGNOSIS — Z02.6 ENCOUNTER RELATED TO WORKER'S COMPENSATION CLAIM: Primary | ICD-10-CM

## 2019-04-17 DIAGNOSIS — F43.10 PTSD (POST-TRAUMATIC STRESS DISORDER): ICD-10-CM

## 2019-04-17 DIAGNOSIS — F43.23 ADJUSTMENT DISORDER WITH MIXED ANXIETY AND DEPRESSED MOOD: ICD-10-CM

## 2019-04-17 PROCEDURE — 99213 OFFICE O/P EST LOW 20 MIN: CPT | Performed by: FAMILY MEDICINE

## 2019-04-17 RX ORDER — SERTRALINE HYDROCHLORIDE 100 MG/1
150 TABLET, FILM COATED ORAL DAILY
Qty: 90 TABLET | Refills: 1 | COMMUNITY
Start: 2019-04-17 | End: 2019-04-17

## 2019-04-17 RX ORDER — SERTRALINE HYDROCHLORIDE 100 MG/1
150 TABLET, FILM COATED ORAL DAILY
Qty: 135 TABLET | Refills: 1 | Status: SHIPPED | OUTPATIENT
Start: 2019-04-17 | End: 2020-06-11

## 2019-04-17 ASSESSMENT — MIFFLIN-ST. JEOR: SCORE: 1978.53
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
## 2019-04-17 NOTE — PATIENT INSTRUCTIONS
[facility]     Discharged by : Debby Centeno MA    Paper scripts provided to patient : no     If you have any questions regarding your visit please contact your care team:     Team Gold                Clinic Hours Telephone Number     Dr. Anil Rojas, [facility]   7am-7pm  Monday - Thursday   7am-5pm  Fridays  [phone]   (Appointment scheduling available 24/7)     RN Line  [phone] option 2     [facility] [place] [facility] - 11am-9pm Monday-Friday Saturday-Sunday- 9am-5pm     [place] -   5pm-9pm Monday-Friday Saturday-Sunday- 9am-5pm    [phone] - [place]iel Coello    [phone] - [place]     For a Price Quote for your services, please call our [employer] Price Line at [phone].     What options do I have for visits at the clinic other than the traditional office visit?     To expand how we care for you, many of our providers are utilizing electronic visits (e-visits) and telephone visits, when medically appropriate, for interactions with their patients rather than a visit in the clinic. We also offer nurse visits for many medical concerns. Just like any other service, we will bill your insurance company for this type of visit based on time spent on the phone with your provider. Not all insurance companies cover these visits. Please check with your medical insurance if this type of visit is covered. You will be responsible for any charges that are not paid by your insurance.     E-visits via [employer]: generally incur a $45.00 fee.     Telephone visits:  Time spent on the phone: *charged based on time that is spent on the phone in increments of 10 minutes. Estimated cost:   5-10 mins $30.00   11-20 mins. $59.00   21-30 mins. $85.00       Use [employer] (secure email communication and access to your chart) to send your primary care provider a message or make an appointment. Ask someone on your Team how to sign up for [employer].      As always, Thank you for trusting us with your health care needs!      [facility]:    Scheduling Appointments  [facility] Galdame[facility]  Call: [phone]    Jackie Quintanilla [facility]  Call: [phone]    [facility]  Call: [phone]    For Gastroenterology referrals   [facility]   Clinics and Surgery Center, 4th Floor   [address] [place] [address]   Appointments: [phone]    WHERE TO GO FOR CARE?    Clinic    Make an appointment if you:       Are sick (cold, cough, flu, sore throat, earache or in pain).       Have a small injury (sprain, small cut, burn or broken bone).       Need a physical exam, Pap smear, vaccine or prescription refill.       Have questions about your health or medicines.    To reach us:      Call [phone] ([phone]). Open 24 hours every day. (For counseling services, call [phone].)    Log into [facility] [employer]. (Visit [email or web address] to create an account.) Hospital emergency room    An emergency is a serious or life- threatening problem that must be treated right away.    Call 345 or get to the hospital if you have:      Very bad or sudden:            - Chest pain or pressure         - Bleeding         - Head or belly pain         - Dizziness or trouble seeing, walking or                          Speaking      Problems breathing      Blood in your vomit or you are coughing up blood      A major injury (knocked out, loss of a finger or limb, rape, broken bone protruding from skin)    A mental health crisis. (Or call the Mental Health Crisis line at 1-922.776.8042 or Suicide Prevention Hotline at 1-417.648.2461.)    Open 24 hours every day. You don't need an appointment.     Urgent care    Visit urgent care for sickness or small injuries when the clinic is closed. You don't need an appointment. To check hours or find an urgent care near you, visit  [email or web address]. Online care    Get online care from [facility] for more than 70 common problems, like colds, allergies and infections. Open 24 hours every day at:   [email or web address]   Need help deciding?    For advice about where to be seen, you may call your clinic and ask to speak with a nurse. We're here for you 24 hours every day.         If you are deaf or hard of hearing, please let us know. We provide many free services including sign language interpreters, oral interpreters, TTYs, telephone amplifiers, note takers and written materials.

## 2019-04-25 ENCOUNTER — OFFICE VISIT (OUTPATIENT)
Dept: FAMILY MEDICINE | Facility: CLINIC | Age: 49
End: 2019-04-25
Payer: OTHER MISCELLANEOUS

## 2019-04-25 VITALS
SYSTOLIC BLOOD PRESSURE: 124 MMHG | DIASTOLIC BLOOD PRESSURE: 83 MMHG | OXYGEN SATURATION: 96 % | HEART RATE: 80 BPM | WEIGHT: 291 LBS | HEIGHT: 67 IN | TEMPERATURE: 97.9 F | BODY MASS INDEX: 45.67 KG/M2

## 2019-04-25 DIAGNOSIS — F43.23 ADJUSTMENT DISORDER WITH MIXED ANXIETY AND DEPRESSED MOOD: ICD-10-CM

## 2019-04-25 DIAGNOSIS — Z02.6 ENCOUNTER RELATED TO WORKER'S COMPENSATION CLAIM: Primary | ICD-10-CM

## 2019-04-25 DIAGNOSIS — F43.10 PTSD (POST-TRAUMATIC STRESS DISORDER): ICD-10-CM

## 2019-04-25 DIAGNOSIS — F51.02 ADJUSTMENT INSOMNIA: ICD-10-CM

## 2019-04-25 PROCEDURE — 99213 OFFICE O/P EST LOW 20 MIN: CPT | Performed by: FAMILY MEDICINE

## 2019-04-25 ASSESSMENT — MIFFLIN-ST. JEOR: SCORE: 1987.6
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
## 2019-04-25 NOTE — PATIENT INSTRUCTIONS
[facility]     Discharged by : Christina Watkins CMA       If you have any questions regarding your visit please contact your care team:     Team Gold                Clinic Hours Telephone Number     Dr. Anil Rojas, CNP   7am-7pm  Monday - Thursday   7am-5pm  Fridays  [phone]   (Appointment scheduling available 24/7)     RN Line  [phone] option 2     Urgent Care - [place] and [place] - 11am-9pm Monday-Friday Saturday-Sunday- 9am-5pm     [place] -   5pm-9pm Monday-Friday Saturday-Sunday- 9am-5pm    [phone] - [place]    [phone] - [place]     For a Price Quote for your services, please call our [employer] Price Line at [phone].     What options do I have for visits at the clinic other than the traditional office visit?     To expand how we care for you, many of our providers are utilizing electronic visits (e-visits) and telephone visits, when medically appropriate, for interactions with their patients rather than a visit in the clinic. We also offer nurse visits for many medical concerns. Just like any other service, we will bill your insurance company for this type of visit based on time spent on the phone with your provider. Not all insurance companies cover these visits. Please check with your medical insurance if this type of visit is covered. You will be responsible for any charges that are not paid by your insurance.     E-visits via [employer]: generally incur a $45.00 fee.     Telephone visits:  Time spent on the phone: *charged based on time that is spent on the phone in increments of 10 minutes. Estimated cost:   5-10 mins $30.00   11-20 mins. $59.00   21-30 mins. $85.00       Use [facility] (secure email communication and access to your chart) to send your primary care provider a message or make an appointment. Ask someone on your Team how to sign up for [facility].     As always, Thank you for trusting  us with your health care needs!      [facility]:    Scheduling Appointments  [facility]  Call: [phone]    [place], [facility]  Call: [phone]    [facility]  Call: [phone]    For Gastroenterology referrals   [facility]   Clinics and Surgery Center, 4th Floor   [address] [place] [address]   Appointments: [phone]    WHERE TO GO FOR CARE?    Clinic    Make an appointment if you:       Are sick (cold, cough, flu, sore throat, earache or in pain).       Have a small injury (sprain, small cut, burn or broken bone).       Need a physical exam, Pap smear, vaccine or prescription refill.       Have questions about your health or medicines.    To reach us:      Call [phone] ([phone]). Open 24 hours every day. (For counseling services, call [phone].)    Log into [facility] at HereOrThere. (Visit [email or web address] to create an account.) Hospital emergency room    An emergency is a serious or life- threatening problem that must be treated right away.    Call 727 or get to the hospital if you have:      Very bad or sudden:            - Chest pain or pressure         - Bleeding         - Head or belly pain         - Dizziness or trouble seeing, walking or                          Speaking      Problems breathing      Blood in your vomit or you are coughing up blood      A major injury (knocked out, loss of a finger or limb, rape, broken bone protruding from skin)    A mental health crisis. (Or call the Mental Health Crisis line at 1-787.985.8903 or Suicide Prevention Hotline at 1-567.442.8596.)    Open 24 hours every day. You don't need an appointment.     Urgent care    Visit urgent care for sickness or small injuries when the clinic is closed. You don't need an appointment. To check hours or find an urgent care near you, visit [email or web address]. Online care    Get  online care from [facility] for more than 70 common problems, like colds, allergies and infections. Open 24 hours every day at:   [email or web address]   Need help deciding?    For advice about where to be seen, you may call your clinic and ask to speak with a nurse. We're here for you 24 hours every day.         If you are deaf or hard of hearing, please let us know. We provide many free services including sign language interpreters, oral interpreters, TTYs, telephone amplifiers, note takers and written materials.

## 2019-05-08 ENCOUNTER — PATIENT OUTREACH (OUTPATIENT)
Dept: NURSING | Facility: CLINIC | Age: 49
End: 2019-05-08

## 2019-05-08 NOTE — PROGRESS NOTES
Outreached patient to follow-up on previously cancelled appointment, but wasn't able to connect. Left message for call back.    Jacob Dominguez, Health    5/8/2019    10:50 AM

## 2019-05-13 ENCOUNTER — OFFICE VISIT (OUTPATIENT)
Dept: FAMILY MEDICINE | Facility: CLINIC | Age: 49
End: 2019-05-13
Payer: OTHER MISCELLANEOUS

## 2019-05-13 VITALS
HEIGHT: 67 IN | WEIGHT: 293 LBS | TEMPERATURE: 98.7 F | DIASTOLIC BLOOD PRESSURE: 87 MMHG | BODY MASS INDEX: 45.99 KG/M2 | SYSTOLIC BLOOD PRESSURE: 138 MMHG | HEART RATE: 81 BPM

## 2019-05-13 DIAGNOSIS — F51.02 ADJUSTMENT INSOMNIA: ICD-10-CM

## 2019-05-13 DIAGNOSIS — F43.10 PTSD (POST-TRAUMATIC STRESS DISORDER): ICD-10-CM

## 2019-05-13 DIAGNOSIS — F43.23 ADJUSTMENT DISORDER WITH MIXED ANXIETY AND DEPRESSED MOOD: ICD-10-CM

## 2019-05-13 DIAGNOSIS — Z02.6 ENCOUNTER RELATED TO WORKER'S COMPENSATION CLAIM: Primary | ICD-10-CM

## 2019-05-13 PROCEDURE — 99213 OFFICE O/P EST LOW 20 MIN: CPT | Performed by: FAMILY MEDICINE

## 2019-05-13 ASSESSMENT — MIFFLIN-ST. JEOR: SCORE: 2028.42
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
## 2019-05-22 ENCOUNTER — MYC MEDICAL ADVICE (OUTPATIENT)
Dept: FAMILY MEDICINE | Facility: CLINIC | Age: 49
End: 2019-05-22

## 2019-05-29 ENCOUNTER — PATIENT OUTREACH (OUTPATIENT)
Dept: NURSING | Facility: CLINIC | Age: 49
End: 2019-05-29

## 2019-05-29 NOTE — PROGRESS NOTES
Outreached patient for second time to follow-up on scheduling future Health Coaching appointments, but wasn't able to connect. Left message for call back.    Jacob Dominguez, Health    5/29/2019    9:34 AM

## 2019-06-21 ENCOUNTER — PATIENT OUTREACH (OUTPATIENT)
Dept: NURSING | Facility: CLINIC | Age: 49
End: 2019-06-21

## 2019-06-21 NOTE — PROGRESS NOTES
Outreached patient for third and final time, but wasn't able to connect. Left message for call back.    Jacob Dominguez, Health    6/21/2019    2:59 PM

## 2019-07-08 ENCOUNTER — PATIENT OUTREACH (OUTPATIENT)
Dept: NURSING | Facility: CLINIC | Age: 49
End: 2019-07-08

## 2019-07-08 NOTE — PROGRESS NOTES
Outreached patient one last time, but wasn't able to connect. Left message for call back.    Jacob Dominguez, Health    7/8/2019    4:24 PM

## 2019-07-09 NOTE — TELEPHONE ENCOUNTER
Outreached patient to follow-up after not hearing back for some time about rescheduling anothering appointment.  Left message for call back.    Jacob Dominguez, Health    7/27/2018    3:13 PM    
stated

## 2019-08-26 DIAGNOSIS — R73.03 PREDIABETES: ICD-10-CM

## 2019-08-26 DIAGNOSIS — E55.9 VITAMIN D DEFICIENCY: ICD-10-CM

## 2019-08-26 DIAGNOSIS — E66.01 MORBID OBESITY DUE TO EXCESS CALORIES (H): ICD-10-CM

## 2019-08-27 RX ORDER — TOPIRAMATE 50 MG/1
TABLET, FILM COATED ORAL
Qty: 270 TABLET | Refills: 1 | OUTPATIENT
Start: 2019-08-27

## 2019-08-27 NOTE — TELEPHONE ENCOUNTER
Topamax  Routing refill request to provider for review/approval because:  A break in medication    Lin Truong, RN, BSN

## 2019-08-27 NOTE — TELEPHONE ENCOUNTER
"Last Written Prescription Date:  8/08/18  Last Fill Quantity: 270 tablet,  # refills: 1   Last office visit: 8/8/2018 with prescribing provider:  CAROLYN Castaneda   Future Office Visit:      Requested Prescriptions   Pending Prescriptions Disp Refills     topiramate (TOPAMAX) 50 MG tablet [Pharmacy Med Name: TOPIRAMATE 50MG TABS] 270 tablet 1     Sig: TAKE ONE AND ONE-HALF TABLETS BY MOUTH TWICE A DAY       Anti-Seizure Meds Protocol  Failed - 8/26/2019  7:04 PM        Failed - Recent (12 mo) or future (30 days) visit within the authorizing provider's specialty     Patient had office visit in the last 12 months or has a visit in the next 30 days with authorizing provider or within the authorizing provider's specialty.  See \"Patient Info\" tab in inbasket, or \"Choose Columns\" in Meds & Orders section of the refill encounter.              Failed - Review Authorizing provider's last note.      Refer to last progress notes: confirm request is for original authorizing provider (cannot be through other providers).          Passed - Normal CBC on file in past 26 months     Recent Labs   Lab Test 08/08/18  1315   WBC 6.5   RBC 5.02   HGB 11.9   HCT 38.8                    Passed - Normal ALT or AST on file in past 26 months     Recent Labs   Lab Test 12/06/17  0905   ALT 18     Recent Labs   Lab Test 12/06/17  0905   AST 15             Passed - Normal platelet count on file in past 26 months     Recent Labs   Lab Test 08/08/18  1315                  Passed - Medication is active on med list        Passed - No active pregnancy on record        Passed - No positive pregnancy test in last 12 months          "

## 2019-08-29 RX ORDER — TOPIRAMATE 50 MG/1
75 TABLET, FILM COATED ORAL 2 TIMES DAILY
Qty: 270 TABLET | Refills: 1 | Status: SHIPPED | OUTPATIENT
Start: 2019-08-29 | End: 2020-06-11

## 2020-03-15 ENCOUNTER — HEALTH MAINTENANCE LETTER (OUTPATIENT)
Age: 50
End: 2020-03-15

## 2020-05-11 ENCOUNTER — OFFICE VISIT - HEALTHEAST (OUTPATIENT)
Dept: INTERNAL MEDICINE | Facility: CLINIC | Age: 50
End: 2020-05-11

## 2020-05-11 ENCOUNTER — VIRTUAL VISIT (OUTPATIENT)
Dept: FAMILY MEDICINE | Facility: OTHER | Age: 50
End: 2020-05-11

## 2020-05-11 DIAGNOSIS — Z20.822 SUSPECTED 2019 NOVEL CORONAVIRUS INFECTION: ICD-10-CM

## 2020-05-11 NOTE — PROGRESS NOTES
"Date: 2020 11:40:58  Clinician: Alistair Wen  Clinician NPI: [id number]  Patient: Keerthi Sloan  Patient : 1970  [address] [place] [id number]  Patient Phone: [phone]  Visit Protocol: URI  Patient Summary:  Keerthi is a 50 year old ( : 1970 ) female who initiated a Visit for COVID-19 (Coronavirus) evaluation and screening. When asked the question \"Please sign me up to receive news, health information and promotions. \", Keerthi responded \"No\".    Keerthi states her symptoms started gradually 5-6 days ago. After her symptoms started, they improved and then got worse again.   Her symptoms consist of myalgia, rhinitis, a sore throat, a cough, nasal congestion, nausea, ageusia, anosmia, ear pain, a headache, malaise, and wheezing.   Symptom details     Nasal secretions: The color of her mucus is yellow and green.    Cough: Keerthi coughs almost every minute and her cough is more bothersome at night. Phlegm comes into her throat when she coughs. She does not believe her cough is caused by post-nasal drip. The color of the phlegm is green and yellow.     Sore throat: Keerthi reports having moderate throat pain (4-6 on a 10 point pain scale), does not have exudate on her tonsils, and can swallow liquids. She is not sure if the lymph nodes in her neck are enlarged. A rash has not appeared on the skin since the sore throat started.     Wheezing: Keerthi has not ever been diagnosed with asthma. The wheezing interferes with her normal daily activities.    Headache: She states the headache is moderate (4-6 on a 10 point pain scale).      Keerthi denies having fever, facial pain or pressure, vomiting, teeth pain, diarrhea, and chills. She also denies taking antibiotic medication for the symptoms, having a sinus infection within the past year, and having recent facial or sinus surgery in the past 60 days.   Precipitating events  Keerthi is not sure if she has " been exposed to someone with strep throat. She has not recently been exposed to someone with influenza. Keerthi has been in close contact with the following high risk individuals: adults 65 or older.   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 She provides direct patient care. 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   Keerthi has not had a close contact with a laboratory-confirmed COVID-19 patient within 14 days of symptom onset. She also has not had a close contact with a suspected COVID-19 patient within 14 days of symptom onset.   Triage Point(s) temporarily suspended for COVID-19 (Coronavirus) screening  Keerthi reported the following symptoms which were previously protocol referral points. These protocol referral points have temporarily been removed for purposes of COVID-19 (Coronavirus) screening.     Difficulty breathing even when resting and can only speak in phrase(s)    Wheezing that keeps Keerthi from doing daily activities     Pertinent medical history  Keerthi does not get yeast infections when she takes antibiotics.   Keerthi needs a return to work/school note.   Weight: 300 lbs   Keerthi does not smoke or use smokeless tobacco.   She denies pregnancy and denies breastfeeding. She has menstruated in the past month.   Additional information as reported by the patient (free text): My chest and ears hurt when I coigh   Weight: 300 lbs    MEDICATIONS: No current medications, ALLERGIES: NKDA  Clinician Response:  Dear Keerthi,   Your symptoms show that you may have coronavirus (COVID-19). This illness can cause fever, cough and trouble breathing. Many people get a mild case and get better on their own. Some people can get very sick.   Will I be tested for COVID-19?  We would  recommend you be tested for coronavirus. Here is how to get that scheduled:   Call [phone]. Tell them you were referred by [facility] to have a COVID-19 test. You will be scheduled at one of our [facility] testing locations (drive-up). Please have your [facility] visit information ready when you call including your visit ID number to verify you were referred.    How can I protect others in the meantime?  First, stay home and away from others (self-isolate) until:   You've had no fever---and no medicine that reduces fever---for 3 full days (72 hours). And...    Your other symptoms have gotten better. For example, your cough or breathing has improved. And...    At least 10 days have passed since your symptoms started.   During this time:   Don't go to work, school or anywhere else.     Stay away from others in your home. No hugging, kissing or shaking hands.    Don't let anyone visit.    Cover your mouth and nose with a mask, tissue or wash cloth to avoid spreading germs.    Wash your hands and face often. Use soap and water.   How can I take care of myself?  1.Take Tylenol (acetaminophen) for fever or pain. If you have liver or kidney problems, ask your family doctor if it's okay to take Tylenol.   Adults can take either:    650 mg (two 325 mg pills) every 4 to 6 hours, or...    1,000 mg (two 500 mg pills) every 8 hours as needed.     Note: Don't take more than 3,000 mg in one day.   For children, check the Tylenol bottle for the right dose. The dose is based on the child's age or weight.  2.If you have other health problems (like cancer, heart failure, an organ transplant or severe kidney disease): Call your specialty clinic if you don't feel better in the next 2 days.  3.Know when to call 911: If your breathing is so bad that it keeps you from doing normal activities, call 911 or go to the emergency room. Tell them that you've been staying home and may have COVID-19.  4.Sign up for [employer] [facility]. We know it's scary to  hear that you might have COVID-19. We want to track your symptoms to make sure you're okay over the next 2 weeks. Please look for an email from [employer]---this is a free, online program that we'll use to keep in touch. To sign up, follow the link in the email. Learn more at [email or web address].  Where can I get more information?  To learn more about COVID-19 and how to care for yourself at home, please visit the CDC website at https://www.cdc.gov/coronavirus/2019-ncov/about/steps-when-sick.html.  For more about your care at [facility], please visit [email or web address]     Diagnosis: Acute upper respiratory infection, unspecified  Diagnosis ICD: J06.9  Prescription: benzonatate (Tessalon Perles) 100 mg oral capsule 21 capsule, 7 days supply. Take 1 capsule by mouth 3 times per day for 7 days as needed. Refills: 0, Refill as needed: no, Allow substitutions: yes  Pharmacy: [facility]/pharmacy #[id number] - [phone] - [address] [place] [address]

## 2020-05-13 ENCOUNTER — COMMUNICATION - HEALTHEAST (OUTPATIENT)
Dept: INTERNAL MEDICINE | Facility: CLINIC | Age: 50
End: 2020-05-13

## 2020-05-18 ENCOUNTER — HOSPITAL ENCOUNTER (EMERGENCY)
Facility: CLINIC | Age: 50
Discharge: HOME | End: 2020-05-18
Attending: EMERGENCY MEDICINE | Admitting: EMERGENCY MEDICINE
Payer: COMMERCIAL

## 2020-05-18 VITALS
WEIGHT: 293 LBS | OXYGEN SATURATION: 95 % | BODY MASS INDEX: 47.93 KG/M2 | RESPIRATION RATE: 18 BRPM | SYSTOLIC BLOOD PRESSURE: 145 MMHG | DIASTOLIC BLOOD PRESSURE: 87 MMHG | TEMPERATURE: 99.3 F | HEART RATE: 78 BPM

## 2020-05-18 DIAGNOSIS — R10.2 PELVIC PAIN IN FEMALE: ICD-10-CM

## 2020-05-18 DIAGNOSIS — N94.6 MENSTRUAL CRAMPS: ICD-10-CM

## 2020-05-18 LAB
ALBUMIN UR-MCNC: 30 MG/DL
APPEARANCE UR: ABNORMAL
BILIRUB UR QL STRIP: NEGATIVE
COLOR UR AUTO: YELLOW
GLUCOSE UR STRIP-MCNC: NEGATIVE MG/DL
HCG UR QL: NEGATIVE
HGB UR QL STRIP: ABNORMAL
KETONES UR STRIP-MCNC: ABNORMAL MG/DL
LEUKOCYTE ESTERASE UR QL STRIP: ABNORMAL
MUCOUS THREADS #/AREA URNS LPF: PRESENT /LPF
NITRATE UR QL: NEGATIVE
PH UR STRIP: 5.5 PH (ref 5–7)
RBC #/AREA URNS AUTO: >182 /HPF (ref 0–2)
SOURCE: ABNORMAL
SP GR UR STRIP: 1.02 (ref 1–1.03)
SQUAMOUS #/AREA URNS AUTO: 2 /HPF (ref 0–1)
UROBILINOGEN UR STRIP-MCNC: NORMAL MG/DL (ref 0–2)
WBC #/AREA URNS AUTO: 25 /HPF (ref 0–5)

## 2020-05-18 PROCEDURE — 25000132 ZZH RX MED GY IP 250 OP 250 PS 637: Performed by: EMERGENCY MEDICINE

## 2020-05-18 PROCEDURE — 25000128 H RX IP 250 OP 636: Performed by: EMERGENCY MEDICINE

## 2020-05-18 PROCEDURE — 81001 URINALYSIS AUTO W/SCOPE: CPT | Performed by: EMERGENCY MEDICINE

## 2020-05-18 PROCEDURE — 99283 EMERGENCY DEPT VISIT LOW MDM: CPT

## 2020-05-18 PROCEDURE — 81025 URINE PREGNANCY TEST: CPT | Performed by: EMERGENCY MEDICINE

## 2020-05-18 RX ORDER — IBUPROFEN 200 MG
400 TABLET ORAL ONCE
Status: COMPLETED | OUTPATIENT
Start: 2020-05-18 | End: 2020-05-18

## 2020-05-18 RX ORDER — ONDANSETRON 4 MG/1
4 TABLET, ORALLY DISINTEGRATING ORAL ONCE
Status: COMPLETED | OUTPATIENT
Start: 2020-05-18 | End: 2020-05-18

## 2020-05-18 RX ORDER — OXYCODONE HYDROCHLORIDE 5 MG/1
5 TABLET ORAL ONCE
Status: COMPLETED | OUTPATIENT
Start: 2020-05-18 | End: 2020-05-18

## 2020-05-18 RX ADMIN — IBUPROFEN 400 MG: 200 TABLET, FILM COATED ORAL at 20:10

## 2020-05-18 RX ADMIN — OXYCODONE HYDROCHLORIDE 5 MG: 5 TABLET ORAL at 20:10

## 2020-05-18 RX ADMIN — ONDANSETRON 4 MG: 4 TABLET, ORALLY DISINTEGRATING ORAL at 20:18

## 2020-05-18 ASSESSMENT — ENCOUNTER SYMPTOMS
FEVER: 0
VOMITING: 1
COUGH: 0
ABDOMINAL PAIN: 1
NAUSEA: 1

## 2020-05-18 NOTE — ED AVS SNAPSHOT
[facility] Emergency Department  [address]  [facility] [address]  Phone:  [phone]  Fax:  [phone]                                    Keerthi Sloan   MRN: [id number]    Department:  [facility] Emergency Department   Date of Visit:  5/18/2020           After Visit Summary Signature Page    I have received my discharge instructions, and my questions have been answered. I have discussed any challenges I see with this plan with the nurse or doctor.    ..........................................................................................................................................  Patient/Patient Representative Signature      ..........................................................................................................................................  Patient Representative Print Name and Relationship to Patient    ..................................................               ................................................  Date                                   Time    ..........................................................................................................................................  Reviewed by Signature/Title    ...................................................              ..............................................  Date                                               Time          22EPIC Rev 08/18

## 2020-05-19 ENCOUNTER — PATIENT OUTREACH (OUTPATIENT)
Dept: CARE COORDINATION | Facility: CLINIC | Age: 50
End: 2020-05-19

## 2020-05-19 DIAGNOSIS — R10.2 PELVIC PAIN IN FEMALE: Primary | ICD-10-CM

## 2020-05-19 DIAGNOSIS — Z20.822 SUSPECTED COVID-19 VIRUS INFECTION: ICD-10-CM

## 2020-05-19 NOTE — TELEPHONE ENCOUNTER
Reason for Call:  Other call back    Detailed comments: Patient was advised to follow up with PCP after an ER visit. Patient is interested in a follow up visit and would appreciate a call about this.    Phone Number Patient can be reached at: Home number on file [phone] (home)    Best Time: Any    Can we leave a detailed message on this number? NO    Call taken on 5/19/2020 at 9:41 AM by Erin Chavira MA

## 2020-05-19 NOTE — PROGRESS NOTES
Clinic Care Coordination Contact  Community Health Worker Initial Outreach    Patient accepts CC: No, patient has no questions for a nurse at this time.     The Clinic Community Health Worker spoke with the patient today at the request of the PCP to discuss possible Clinic Care Coordination enrollment. The service was described to the patient and immediate needs were discussed. The patient declined enrollment at this time. The PCP is encouraged to refer in the future if the patient's needs change.    CHW spoke with patient today. Patient reported she is doing better. CHW asked if patient had any questions for a nurse at this time. Patient said ER staff recommended a follow up with PCP, but she said her PCP is out on maternity leave. CHW offered to send a message to PCP or the covering staff for PCP to ask them to reach out to patient about a follow up. Patient said that would be great. CHW will send message to PCP or covering team to reach out to patient for a follow up.    CHW asked if patient had any questions for a nurse or if she wanted to wait for the follow up. Patient said she is just going to wait for the follow up visit.    Patient said she thinks her pain has to do with her uterus, and that she has had fybroids in the past.    CHW told patient she will send a message to PCP or covering team and someone should be calling her when they get the chance.    Reason for Referral: Care Transition: ED to outpatient  Additional pertinent details: Suspected COVID 19 tested 9 days ago and not detected. Seen in the ED for pelvic pain.    Erin Chavira  Community Health Worker   [facility]  [address] [place] [address]  [facility]  [address] [place] [address]  [facility]  [address] [place] [address]  [email or web address]

## 2020-05-19 NOTE — ED TRIAGE NOTES
Pt c/o pelvic cramping pain starting today.  Pt is on her menstral cycle but not having heavy bleeding.

## 2020-05-19 NOTE — ED PROVIDER NOTES
History     Chief Complaint:  Abdominal Pain     HPI   Keerthi Sloan is a 50 year old female who presents for evaluation of abdominal pain. Today the patient started to develop new lower abdominal pain with associated nausea and vomiting. Due to this new pain, the patient came into the ED for evaluation. Notably, the patient is currently on her menstrual period and believes that her pain may be due to menstrual cramping, as she has previously had severe cramping. She denies any fever or cough.  She has not been taking ibuprofen as she was being tested for COIVD. She usually takes NSAIDs with improvement. She has had an ablation in 2014.     Allergies:  NKDA      Medications:    Vitamin D3  Ferrous sulfate  Metformin  Omeprazole  Oxybutynin   Phentermine   Zantac  Zoloft  Topamax   Trazodone       Past Medical History:    Dysmenorrhea  Obesity   Stress incontinence   Obstructive sleep apnea   Uterine leiomyoma   PTSD   Vitamin D deficiency     Past Surgical History:    Gyn surgery  Hernia repair  Tubal ligation     Family History:    Diabetes - Father and brother   Hypertension - Father and brother   Kidney disease - Father     Social History:  Tobacco use:    Never smoker   Alcohol use:    Negative   Drug use:    Negative   Marital status:       Accompanied to ED by:  Alone       Review of Systems   Constitutional: Negative for fever.   Respiratory: Negative for cough.    Gastrointestinal: Positive for abdominal pain, nausea and vomiting.   Genitourinary: Positive for vaginal bleeding.   All other systems reviewed and are negative.    Physical Exam   First Vitals:  BP: (!) 153/87  Pulse: 78  Temp: 99.3  F (37.4  C)  Resp: 18  Weight: 138.8 kg (306 lb)  SpO2: 95 %    Physical Exam  General: Patient is alert and interactive when I enter the room, moaning and holding abdomen   Head:  The scalp, face, and head appear normal  Eyes:  Conjunctivae are normal  ENT:    The nose is normal    Pinnae are  normal    External acoustic canals are normal  Neck:  Trachea midline  CV:  Pulses are normal.    Resp:  No respiratory distress   Abdomen:      Soft, LLQ tenderness, no guarding, non-distended  Musc:  Normal muscular tone    No major joint effusions    No asymmetric leg swelling    Good capillary refill noted  Skin:  No rash or lesions noted  Neuro:  Speech is normal and fluent. Face is symmetric.     Moving all extremities well.   Psych: Awake. Alert.  Normal affect.  Appropriate interactions.     Emergency Department Course     Laboratory:  UA with Microscopic: Trace urineketon, Large blood, Protein albumin 30, Small leukocyte esterase, WBC 25 high, RBC >182 high, Squamous epithelial 2 high, Mucous present, o/w Negative   HCG Qualitative Urine: Negative     Interventions:  2010 Oxycodone 5 mg PO   2010 Ibuprofen 400 mg PO   2018 Zofran ODT 4 mg PO     Emergency Department Course:  Nursing notes and vitals reviewed.  2001: I performed an exam of the patient as documented above.     I personally reviewed the laboratory results with the patient and answered all related questions prior to discharge.     Findings and plan explained to the Patient. Patient discharged home with instructions regarding supportive care, medications, and reasons to return. The importance of close follow-up was reviewed.     Impression & Plan      Medical Decision Making:  Keerthi Sloan is a 49 yo F who presents with abdominal pain. Patient endorses pain that seems consistent with prior menstrual cramps but has been unable to take IBU. Patient was given ibuprofen and oxycodone. Patient had a great improvement of her pain. I encouraged to take NSAIDs for pain. Doubt other etiology of her pain given start of menstrual cycle and that this is similar to her pain in the past. Encouraged gynecology follow-up with pain returns.      Diagnosis:    ICD-10-CM   1. Pelvic pain in female  R10.2   2. Menstrual cramps  N94.6         Disposition:  Discharged to home.         I, Jeramie Vale, am serving as a scribe at 8:01 PM on 5/18/2020 to document services personally performed by Dr. Alise Hunt, based on my observations and the provider's statements to me.     [facility] EMERGENCY DEPARTMENT       Alise Hunt MD  05/20/20 2144

## 2020-06-11 ENCOUNTER — OFFICE VISIT (OUTPATIENT)
Dept: FAMILY MEDICINE | Facility: CLINIC | Age: 50
End: 2020-06-11
Payer: COMMERCIAL

## 2020-06-11 VITALS
TEMPERATURE: 98.8 F | BODY MASS INDEX: 49.62 KG/M2 | SYSTOLIC BLOOD PRESSURE: 117 MMHG | HEART RATE: 77 BPM | OXYGEN SATURATION: 99 % | DIASTOLIC BLOOD PRESSURE: 80 MMHG | WEIGHT: 293 LBS

## 2020-06-11 DIAGNOSIS — N94.6 DYSMENORRHEA: Primary | ICD-10-CM

## 2020-06-11 DIAGNOSIS — R73.03 PREDIABETES: ICD-10-CM

## 2020-06-11 DIAGNOSIS — F43.23 ADJUSTMENT DISORDER WITH MIXED ANXIETY AND DEPRESSED MOOD: ICD-10-CM

## 2020-06-11 DIAGNOSIS — F51.02 ADJUSTMENT INSOMNIA: ICD-10-CM

## 2020-06-11 DIAGNOSIS — D25.9 UTERINE LEIOMYOMA, UNSPECIFIED LOCATION: ICD-10-CM

## 2020-06-11 DIAGNOSIS — G43.109 MIGRAINE WITH AURA AND WITHOUT STATUS MIGRAINOSUS, NOT INTRACTABLE: ICD-10-CM

## 2020-06-11 DIAGNOSIS — E55.9 VITAMIN D DEFICIENCY: ICD-10-CM

## 2020-06-11 DIAGNOSIS — F43.10 PTSD (POST-TRAUMATIC STRESS DISORDER): ICD-10-CM

## 2020-06-11 PROCEDURE — 99214 OFFICE O/P EST MOD 30 MIN: CPT | Performed by: FAMILY MEDICINE

## 2020-06-11 RX ORDER — METFORMIN HCL 500 MG
500 TABLET, EXTENDED RELEASE 24 HR ORAL 2 TIMES DAILY WITH MEALS
Qty: 180 TABLET | Refills: 3 | Status: SHIPPED | OUTPATIENT
Start: 2020-06-11 | End: 2020-06-24

## 2020-06-11 RX ORDER — SERTRALINE HYDROCHLORIDE 100 MG/1
150 TABLET, FILM COATED ORAL DAILY
Qty: 135 TABLET | Refills: 1 | Status: SHIPPED | OUTPATIENT
Start: 2020-06-11 | End: 2021-01-07

## 2020-06-11 RX ORDER — TRAZODONE HYDROCHLORIDE 150 MG/1
150 TABLET ORAL
Qty: 90 TABLET | Refills: 1 | Status: SHIPPED | OUTPATIENT
Start: 2020-06-11 | End: 2021-01-07

## 2020-06-11 RX ORDER — SUMATRIPTAN 25 MG/1
25 TABLET, FILM COATED ORAL
Qty: 18 TABLET | Refills: 3 | Status: SHIPPED | OUTPATIENT
Start: 2020-06-11 | End: 2021-05-25

## 2020-06-11 ASSESSMENT — PATIENT HEALTH QUESTIONNAIRE - PHQ9
SUM OF ALL RESPONSES TO PHQ QUESTIONS 1-9: 12
5. POOR APPETITE OR OVEREATING: SEVERAL DAYS

## 2020-06-11 ASSESSMENT — ANXIETY QUESTIONNAIRES
3. WORRYING TOO MUCH ABOUT DIFFERENT THINGS: SEVERAL DAYS
GAD7 TOTAL SCORE: 7
1. FEELING NERVOUS, ANXIOUS, OR ON EDGE: SEVERAL DAYS
IF YOU CHECKED OFF ANY PROBLEMS ON THIS QUESTIONNAIRE, HOW DIFFICULT HAVE THESE PROBLEMS MADE IT FOR YOU TO DO YOUR WORK, TAKE CARE OF THINGS AT HOME, OR GET ALONG WITH OTHER PEOPLE: SOMEWHAT DIFFICULT
7. FEELING AFRAID AS IF SOMETHING AWFUL MIGHT HAPPEN: SEVERAL DAYS
2. NOT BEING ABLE TO STOP OR CONTROL WORRYING: SEVERAL DAYS
6. BECOMING EASILY ANNOYED OR IRRITABLE: SEVERAL DAYS
5. BEING SO RESTLESS THAT IT IS HARD TO SIT STILL: SEVERAL DAYS

## 2020-06-11 ASSESSMENT — PAIN SCALES - GENERAL: PAINLEVEL: NO PAIN (0)

## 2020-06-11 NOTE — PATIENT INSTRUCTIONS
Patient Education     Preventing Migraine Headaches: Medicines and Lifestyle Changes  A migraine is a type of severe headache. Having a migraine can be very painful. But there are steps you can take to help prevent migraines.  Medicines to help prevent migraines    Your healthcare provider may prescribe certain medicines to help prevent migraines. These medicines may need to be taken daily. Or they may only need to be taken at times when you re likely to have a migraine.    Common medicines used to help prevent migraines include:  ? Triptans (serotonin receptor agonists)  ? Nonsteroidal anti-inflammatory drugs (such as ibuprofen, available over-the-counter)  ? Beta-blockers  ? Anticonvulsants  ? Tricyclic antidepressants  ? Calcium channel blockers  ? Certain vitamins, minerals, and plant extracts  ? Botulinum toxin injection for certain chronic migraines   ? CGRP (calcitonin gene-related peptide) agnonists are being reviewed by the Food and Drug Administration (FDA)    Lifestyle changes for long-term prevention  Here are some suggestions:    Exercise. Regular exercise can help prevent migraines and improve your health. (If exercise triggers your migraines, talk to your healthcare provider.)    Keep regular habits. Don t skip or delay meals. Drink plenty of water. And go to bed and get up at about the same time each day. This includes weekends.    Try alternative treatments. These are treatments that don't involve the use of medicines or surgery. They may help relieve symptoms and prevent migraines. Some treatment options include biofeedback and acupuncture. Ask your healthcare provider to tell you more about these treatments if you have questions.    Limit caffeine. You may find that caffeine helps relieve pain during an attack. But too much caffeine can also trigger migraines. So, limit the amount of caffeine you consume.  Date Last Reviewed: 5/1/2018 2000-2019 [employer] [address], [place] [address]. All rights reserved. This information is not intended as a substitute for professional medical care. Always follow your healthcare professional's instructions.

## 2020-06-11 NOTE — PROGRESS NOTES
Subjective     Keerthi Sloan is a 50 year old female who presents to clinic today for the following health issues:    HPI   ED/UC Followup:    Facility:  [facility]   Date of visit: 5/18/2020  Reason for visit: Pelvic pain  Current Status: better but still having sharp quick pains    ER summary:  Keerthi Sloan is a 51 yo F who presents with abdominal pain. Patient endorses pain that seems consistent with prior menstrual cramps but has been unable to take IBU. Patient was given ibuprofen and oxycodone. Patient had a great improvement of her pain. I encouraged to take NSAIDs for pain. Doubt other etiology of her pain given start of menstrual cycle and that this is similar to her pain in the past. Encouraged gynecology follow-up with pain returns.     Patient has a long history of menorrhagia and dysmenorrhea.  She's had an ablation in the past (2014).  She also has known fibroids.  Last month and the month prior to that she had two cycles, all of which were very painful and heavy.         Headache  Onset: November    Description:   Location: Starts in occiput and then comes forward   Character: squeezing pain  Frequency:  Has not had one for a few weeks but in the last 2 months it was like once a week  Duration:  Few hours     Intensity: severe    Progression of Symptoms:  intermittent    Accompanying Signs & Symptoms:  Stiff neck: no  Neck or upper back pain: YES  Fever: no   Sinus pressure: no   Nausea or vomiting: YES  Dizziness: YES  Numbness: no   Weakness: YES  Visual changes: YES (trouble focusing)    History:   Head trauma: no   Family history of migraines: no   Previous tests for headaches: no   Neurologist evaluations: no   Able to do daily activities: no   Wake with a headaches: no   Do headaches wake you up: no   Daily pain medication use: no   Work/school stressors/changes: no     Precipitating factors:   Does light make it worse: YES  Does sound make it worse: YES    Alleviating  factors:  Does sleep help: YES    Therapies Tried and outcome: Ibuprofen (Advil, Motrin) and Tylenol    She does get an aura. Her body starts tensing up and then pain slowly comes up her neck.  She has been doing a lot more computer work lately.      Patient Active Problem List   Diagnosis     Grief     Binge eating     Dysmenorrhea     Morbid obesity (H)     History of biliary T-tube placement     Prediabetes     Vitamin D deficiency     Anemia, iron deficiency     Obstructive sleep apnea syndrome     Stress incontinence in female     Uterine leiomyoma     BMI 40.0-44.9, adult (H)     PTSD (post-traumatic stress disorder)     Adjustment disorder with mixed anxiety and depressed mood     Adjustment insomnia     Encounter related to worker's compensation claim     Past Surgical History:   Procedure Laterality Date     GYN SURGERY       HERNIA REPAIR       TUBAL LIGATION  1997    LBTL       Social History     Tobacco Use     Smoking status: Never Smoker     Smokeless tobacco: Never Used   Substance Use Topics     Alcohol use: No     Alcohol/week: 0.0 standard drinks     Family History   Problem Relation Age of Onset     Diabetes Father      Hypertension Father      Kidney Disease Father      Diabetes Brother      Hypertension Brother            Reviewed and updated as needed this visit by Provider         Review of Systems   Constitutional, HEENT, cardiovascular, pulmonary, GI, , musculoskeletal, neuro, skin, endocrine and psych systems are negative, except as otherwise noted.      Objective    /80 (BP Location: Right arm, Patient Position: Sitting, Cuff Size: Adult Large)   Pulse 77   Temp 98.8  F (37.1  C) (Oral)   Wt 143.7 kg (316 lb 12.8 oz)   LMP 05/17/2020   SpO2 99%   BMI 49.62 kg/m    Body mass index is 49.62 kg/m .  Physical Exam   GENERAL: healthy, alert and no distress  EYES: Eyes grossly normal to inspection, PERRL and conjunctivae and sclerae normal  RESP: lungs clear to auscultation - no  rales, rhonchi or wheezes  CV: regular rate and rhythm, normal S1 S2, no S3 or S4, no murmur, click or rub, no peripheral edema and peripheral pulses strong  ABDOMEN: soft, nontender, no hepatosplenomegaly, no masses and bowel sounds normal  NEURO: Normal strength and tone, mentation intact and speech normal    Diagnostic Test Results:  Labs reviewed in Epic        Assessment & Plan     1. Dysmenorrhea  2. Uterine leiomyoma, unspecified location  - Chronic issue.  Previously treated with uterine ablation  - Will get an US to assess for growth of her fibroids   - Referred to OB/Gyn to discuss repeat ablation vs fibroidectomy vs hysterectomy   - US Pelvic Complete w Transvaginal; Future  - OB/GYN REFERRAL  - **CBC with platelets FUTURE anytime; Future  - **Ferritin FUTURE 2mo; Future  - **Iron and iron binding capacity FUTURE 2mo; Future    3. Migraine with aura and without status migrainosus, not intractable  - Chronic issue that has worsened recently due to stress.  Headaches definitely seem to be starting as tension headaches and then progressing to migraines  - Taught her some neck stretches to do 2-3 times daily and also suggested taking breaks from the computer to remind herself of correct posturing and do shoulder rolls  - Imitrex ordered for PRN use in case she continues to have migraines  - Discussed that if these measures are not effective she should follow up virtually and we can discuss migraine prevention medications   - SUMAtriptan (IMITREX) 25 MG tablet; Take 1 tablet (25 mg) by mouth at onset of headache for migraine May repeat in 2 hours. Max 8 tablets/24 hours.  Dispense: 18 tablet; Refill: 3    4. PTSD (post-traumatic stress disorder)  5. Adjustment disorder with mixed anxiety and depressed mood  6. Adjustment insomnia  - Stable, needs refills   - sertraline (ZOLOFT) 100 MG tablet; Take 1.5 tablets (150 mg) by mouth daily  Dispense: 135 tablet; Refill: 1  - traZODone (DESYREL) 150 MG tablet; Take 1  tablet (150 mg) by mouth nightly as needed for sleep  Dispense: 90 tablet; Refill: 1    7. Prediabetes  - Stable, needs refills.  Has follow up with endo scheduled  - metFORMIN (GLUCOPHAGE-XR) 500 MG 24 hr tablet; Take 1 tablet (500 mg) by mouth 2 times daily (with meals)  Dispense: 180 tablet; Refill: 3    Return in about 4 weeks (around 7/9/2020) for Headaches.    Elida Cameron DO  [facility]

## 2020-06-12 ASSESSMENT — ANXIETY QUESTIONNAIRES: GAD7 TOTAL SCORE: 7

## 2020-06-19 ENCOUNTER — HOSPITAL ENCOUNTER (OUTPATIENT)
Dept: ULTRASOUND IMAGING | Facility: CLINIC | Age: 50
Discharge: HOME | End: 2020-06-19
Attending: FAMILY MEDICINE | Admitting: FAMILY MEDICINE
Payer: COMMERCIAL

## 2020-06-19 DIAGNOSIS — D25.9 UTERINE LEIOMYOMA, UNSPECIFIED LOCATION: ICD-10-CM

## 2020-06-19 DIAGNOSIS — N94.6 DYSMENORRHEA: ICD-10-CM

## 2020-06-19 PROCEDURE — 76856 US EXAM PELVIC COMPLETE: CPT

## 2020-06-23 ENCOUNTER — TELEPHONE (OUTPATIENT)
Dept: FAMILY MEDICINE | Facility: CLINIC | Age: 50
End: 2020-06-23

## 2020-06-23 NOTE — LETTER
[facility]  [address] [place] [id number]  Phone: [phone]    June 23, 2020        Keerthi LEON Luther  [address] [place] [id number]          To whom it may concern:    RE: Keerthi L Luther    The above patient has been under my care since 2016.  Recently, her daughter (Massiel Hernadez) has been staying with her due to her underlying medical conditions (since early April).  She should be able to return to work with no restrictions starting on 6/19/2020.    Please contact me for questions or concerns.      Sincerely,          Eldia Cameron, DO

## 2020-06-23 NOTE — TELEPHONE ENCOUNTER
Called pt regarding her recent pelvic US results (see below).  Discussed the results in detail.  I previously referred her to OB/Gyn at her last visit.  She has not had a chance to call them to schedule yet.     Given that the ultrasound is showing a possible neoplastic process in the endometrium, I'd like her to see OB/gyn sooner than later.  RN please call the OB/Gyn clinic to see how quickly they can get her in for a consultation.  Patient is expecting a call either from us or them in the next couple days regarding her appt.        ULTRASOUND PELVIC COMPLETE WITH TRANSVAGINAL IMAGING  6/19/2020 1:56  PM     CLINICAL HISTORY: Menorrhagia and dysmenorrhea, history of fibroids.   Uterine leiomyoma, unspecified location.     TECHNIQUE: Transabdominal scans were performed. Endovaginal ultrasound  was performed to better visualize the adnexa.     COMPARISON: None.     FINDINGS:     UTERUS: 16.6 x 13.7 x 7.9 cm. Uterus is enlarged. There is a large  heterogeneous submucosal mass that occupies much of the central uterus  and measures 8.0 x 9.1 x 4.4 cm, compatible with a large fibroid.  There is a second intramural fibroid in the lower uterine segment that  measures 4.7 x 3.9 x 4.3 cm.     ENDOMETRIUM: 27 mm. The endometrium is thickened and hyperechoic.     RIGHT OVARY: Not visualized due to intestinal gas.     LEFT OVARY: Not visualized due to intestinal gas.     No significant free fluid.                                                                      IMPRESSION:  1.  Enlarged fibroid uterus.  2.  Abnormal thickened and echogenic endometrium. This may be related  to secretory phase and relative obstruction by fibroid. Endometrial  neoplasia cannot be excluded. Consider endometrial biopsy and/or  pelvic MRI with contrast.     SAVANNAH ASENCIO MD

## 2020-06-23 NOTE — LETTER
[address] [address]  Phone: [phone]    June 23, 2020        Keerthi LEON Luther  [address] [place] [id number]          To whom it may concern:    RE: Keerthi L Luther  The above patient has been under my care since 2016.  Recently, her son (Vitor Mujica) has been staying with her due to her underlying medical conditions (since early April).  He should be able to return to work with no restrictions starting on 6/29/2020.      Please contact me for questions or concerns.      Sincerely,          Elida Cameron, DO

## 2020-06-23 NOTE — TELEPHONE ENCOUNTER
Forwarding update to PCP as FYI only.    RN reached out to [facility] at [phone]. Scheduled appt on patient's behalf for Thursday, 7/2/20 at 9:00am with Dr. Edita Worthington. [facility].  [address] [place] [address] [address] [address].    Above relayed to patient with understanding voiced.     Dalila Oscar RN

## 2020-06-24 ENCOUNTER — MYC MEDICAL ADVICE (OUTPATIENT)
Dept: FAMILY MEDICINE | Facility: CLINIC | Age: 50
End: 2020-06-24

## 2020-06-24 ENCOUNTER — VIRTUAL VISIT (OUTPATIENT)
Dept: ENDOCRINOLOGY | Facility: CLINIC | Age: 50
End: 2020-06-24
Payer: COMMERCIAL

## 2020-06-24 DIAGNOSIS — R73.03 PREDIABETES: Primary | ICD-10-CM

## 2020-06-24 DIAGNOSIS — E66.09 OBESITY DUE TO EXCESS CALORIES WITH SERIOUS COMORBIDITY, UNSPECIFIED CLASSIFICATION: ICD-10-CM

## 2020-06-24 DIAGNOSIS — E55.9 VITAMIN D DEFICIENCY: ICD-10-CM

## 2020-06-24 PROCEDURE — 99213 OFFICE O/P EST LOW 20 MIN: CPT | Mod: 95 | Performed by: CLINICAL NURSE SPECIALIST

## 2020-06-24 RX ORDER — PHENTERMINE HYDROCHLORIDE 37.5 MG/1
37.5 TABLET ORAL
Qty: 32 TABLET | Refills: 3 | Status: SHIPPED | OUTPATIENT
Start: 2020-06-24 | End: 2020-10-23

## 2020-06-24 RX ORDER — TOPIRAMATE 25 MG/1
TABLET, FILM COATED ORAL
Qty: 360 TABLET | Refills: 1 | Status: SHIPPED | OUTPATIENT
Start: 2020-06-24 | End: 2020-10-23

## 2020-06-24 NOTE — PROGRESS NOTES
"  Keerthi Sloan is a 50 year old female who is being evaluated via a billable video visit due to the COVID-19 pandemic.      The patient has been notified of following:     \"This video visit will be conducted via a call between you and your physician/provider. We have found that certain health care needs can be provided without the need for an in-person physical exam.  This service lets us provide the care you need with a video conversation.  If a prescription is necessary we can send it directly to your pharmacy.  If lab work is needed we can place an order for that and you can then stop by our lab to have the test done at a later time.    Video visits are billed at different rates depending on your insurance coverage.  Please reach out to your insurance provider with any questions.    If during the course of the call the physician/provider feels a video visit is not appropriate, you will not be charged for this service.\"    Patient has given verbal consent for Video visit? Yes    Will anyone else be joining your video visit? No        Video-Visit Details    Type of service:  Video Visit    Video Start Time: 3:36 pm  Video End Time: 3:55 pm    Originating Location (pt. Location): Other her car    Distant Location (provider location):  [facility]     Platform used for Video Visit: AmWell    Name: Keerthi Sloan  (Last seen 8/8/2018).  HPI:  Keerthi Sloan is a 50 year old female who presents via video visit for obesity, prediabetes, and vitamin D deficiency:      Obesity - She started gaining weight in her 30's.  Weight stabilized in the low 300's the past 5 years and she was unable to lose any weight with diet efforts.  She considered gastric bypass and was being seen at [facility] [place] in anticipation of doing the procedure but she didn't feel comfortable doing the bypass so quit going to the clinic.      She was briefly treated with Phentermine 5/2016.  She discontinued this on her own " after 1-2 months.  Was able to lose 51 lbs with diet efforts, 305-->288-->254 lbs, since 12/2015,then weight increased to 280 lbs, then 297 lbs; now over 300 lbs again. Previously working with [facility], the health .  Is interested in resuming weight loss medication.    + prediabetes - Previously treated with Metformin 500 mg bid.  A1c improved 6.1--->5.5%. Has been off metformin for > 1 year, would like to resume.  +iron deficiency - thought related to heavy menses, started oral iron 6/2016.  Evaluation and treatment through her PCP.  +vitamin D deficiency - D level 15, started vitamin D 50,000 IU weekly.  D improved to 21-22.  Has been off all D replacement for > 1 year.  Sleep Apnea/Snores:Yes: diagnosed with sleep apnea, uses CPAP  Hyperlipidemia:Yes: elevated triglycerides  Hirsutism:Yes: increased facial hair growth (chin)  Family history of Obesity:Yes: Mother had gastric bypass  Diet: Plans to resume diet efforts - track intake with My fitness pal.  Exercise: yes, plans to resume.   Other: elevated 11pm salivary cortisol.  1 mg Dexamethasone suppression test was normal.      PMH/PSH:  History reviewed. No pertinent past medical history.  Past Surgical History:   Procedure Laterality Date     GYN SURGERY       HERNIA REPAIR       TUBAL LIGATION  1997    LBTL     Family Hx:  Family History   Problem Relation Age of Onset     Diabetes Father      Hypertension Father      Kidney Disease Father      Diabetes Brother      Hypertension Brother      Thyroid disease: No         DM2: Yes: father         Autoimmune: DM1, SLE, RA, Vitiligo No    Social Hx:  Social History     Socioeconomic History     Marital status:      Spouse name: Not on file     Number of children: 3     Years of education: Not on file     Highest education level: Not on file   Occupational History     Occupation:      Employer: [employer]   Social Needs     Financial resource strain: Not on file     Food insecurity      Worry: Not on file     Inability: Not on file     Transportation needs     Medical: Not on file     Non-medical: Not on file   Tobacco Use     Smoking status: Never Smoker     Smokeless tobacco: Never Used   Substance and Sexual Activity     Alcohol use: No     Alcohol/week: 0.0 standard drinks     Drug use: No     Sexual activity: Not Currently   Lifestyle     Physical activity     Days per week: Not on file     Minutes per session: Not on file     Stress: Not on file   Relationships     Social connections     Talks on phone: Not on file     Gets together: Not on file     Attends Yarsanism service: Not on file     Active member of club or organization: Not on file     Attends meetings of clubs or organizations: Not on file     Relationship status: Not on file     Intimate partner violence     Fear of current or ex partner: Not on file     Emotionally abused: Not on file     Physically abused: Not on file     Forced sexual activity: Not on file   Other Topics Concern     Parent/sibling w/ CABG, MI or angioplasty before 65F 55M? Not Asked   Social History Narrative     Not on file          MEDICATIONS:  has a current medication list which includes the following prescription(s): metformin, phentermine, topiramate, vitamin d3, ferrous sulfate, omeprazole, oxybutynin er, sertraline, sumatriptan, and trazodone.    ROS   ROS: 10 point ROS neg other than the symptoms noted above in the HPI.      Physical Exam   VS: There were no vitals taken for this visit.  GENERAL: NAD, well dressed, answering questions appropriately, appears stated age.  HEENT: no exopthalmous, no proptosis, no lig lag, no retraction, no scleral icterus  RESPIRATORY: Normal respiratory effort.  CARDIOVASCULAR:   EXTREMITIES:   NEUROLOGY:   MSK:   PSYCH: Intact judgment and insight. A&OX3 with a cordial affect.    LABS:  !COMPREHENSIVE Latest Ref Rng & Units 12/6/2017   SODIUM 133 - 144 mmol/L 142   POTASSIUM 3.4 - 5.3 mmol/L 3.9   CHLORIDE 94 - 109  "mmol/L 106   BUN 7 - 30 mg/dL 15   Creatinine 0.52 - 1.04 mg/dL 0.70   Glucose 70 - 99 mg/dL 90   ANION GAP 3 - 14 mmol/L 10   CALCIUM 8.5 - 10.1 mg/dL 8.6   ALBUMIN 3.4 - 5.0 g/dL 3.6       Component      Latest Ref Rng & Units 5/26/2016 9/8/2016 1/23/2017 12/6/2017   Hemoglobin A1C      0 - 5.6 % 6.1 (H) 5.5 5.7 5.7     Component      Latest Ref Rng & Units 8/8/2018   Hemoglobin A1C      0 - 5.6 % 5.7 (H)     Component    Latest Ref Rng 5/26/2016   Saliva Collection Time     2300   Cortisol Saliva     0.129     1 mg Dexamethasone suppression test:  Component    Latest Ref Rng 6/17/2016   Cortisol Serum    4 - 22 ug/dL 0.8 (L)     Component    Latest Ref Rng 5/26/2016   Testosterone Total    8 - 60 ng/dL 4 (L)   Sex Hormone Binding Globulin    30 - 135 nmol/L 34   Free Testosterone Calculated    0.11 - 0.58 ng/dL 0.07 (L)   Estradiol     26   FSH     5.8   Lutropin     2.7     Component      Latest Ref Rng & Units 12/6/2017 8/8/2018   Vitamin D Deficiency screening      20 - 75 ug/L 14 (L) 17 (L)       Vital Signs 4/6/2018 7/3/2018 8/8/2018 8/8/2018   Weight (LB) 280 lb 288 lb 14.4 oz 297 lb 296 lb 14.4 oz   Height 5' 7\"      BMI (Calculated) 43.95        Vital Signs 6/11/2020   Weight (LB) 316 lb 12.8 oz   Height    BMI (Calculated)        All pertinent notes, labs, and images personally reviewed by me.     A/P  Ms.Kimberly CAROLYN Sloan is a 50 year old seen via video visit for management of:    1. IFG/prediabetes.  Fasting glucose elevated x 2, most recently in 2014.  + FH of type 2 diabetes.  No personal history of GD (3 pregnancies).  A1c elevated at 6.1%, improved to 5.5% on metformin.  Started Metformin  mg bid 6/2016, has been off metformin for > 1 year.   Obtain fasting glucose and A1c.  Restart Metformin 500 mg bid.    2. Morbid Obesity with serious comorbidity.  Obesity-related comorbidity -  IFG/prediabetes and MARÍA.  Obesity is associated with a significant increase in mortality and risk of many " disorders, including diabetes mellitus, hypertension, dyslipidemia, heart disease, stroke, sleep apnea, cancer, and many others. Conversely, weight loss is associated with a reduction in obesity-associated morbidity.    Endocrine evaluation of obesity includes Diabetes/prediabetes, Cushing's and thyroid dysfunction.  The relevant work up for the diagnosis and management of obesity and various treatment options were discussed. Body Mass Index (BMI) has been a standard means for calculating risk for overweight and obesity. The new American Association of Clinical Endocrinology (AACE) algorithm recommends lifestyle modifications as the initial phase of treatment for all patients with the BMI equal or greater than 25 kg/m2. Lifestyle modifications includes use of medical nutrition therapy, exercise, tobacco cessation, adequate quality and quantity of sleep, limited consumption of alcohol and reduced stress through implementation of a structured, multidisciplinary program.    In patients with complications associated with obesity, graded interventions are recommended including pharmacological therapy such as phentermine, orlistat, lorcaserin and phentermine/topiramate ER, contrave ( buproprion/naltreone) and the use of very low calorie meal replacement programs.    If medical intervention is insufficient, surgical therapy may be considered, especially in patients with BMI greater than or equal to 35 kg/m2 with multiple complications. Surgical treatments include lap-band, gastric sleeve or gastric bypass surgery.    She is considering surgical weight loss options.  Interested in transoral gastroplasty - procedure is done at [place].  Counseling exercise ( V65.41)  Dietary counseling( V65.3)  Calculated BMI above the upper parameter and f/u plan was documented in the medical record.  Discussed indications, risks and benefits of all medications prescribed, and answered questions to patient's satisfaction.  Restart phentermine  37.5 mg every day and  Topamax to 25 mg bid.    3. Vitamin D deficiency.  Vitamin D level previously low at 15, improved to 21.  Started Vitamin D 50,000 IU weekly 6/2016. Off D replacement for > 1 year.   Obtain D level and restart D replacement if indicated.    Labs ordered today:   Orders Placed This Encounter   Procedures     Comprehensive metabolic panel     Hemoglobin A1c     Vitamin D Deficiency     Lipid panel reflex to direct LDL Fasting     Radiology/Consults ordered today: None      Follow-up:  3-4 months    Janet Castaneda NP  Endocrinology  [facility]  CC:   ____________________________________________________________

## 2020-06-24 NOTE — PATIENT INSTRUCTIONS
Veronica,    1.  Schedule a fasting lab only appointment.    2.  Restart Metformin 500 mg once daily then increase in 1-2 weeks to one tablet twice daily.  If you get any side effects when you first restart it, wait until the side effects are gone before you increase from one tab/day to two tabs/day.  It is also ok to take two tabs together once daily if it is more convenient and you tolerate it ok.    3.  Restart Phentermine 37.5 mg, initially 1/2 tab every morning.  You can increase in one week to one tab daily if needed and you tolerate it ok.    4.  Restart Topiramate 25 mg twice daily.  You may also increase this dose in the future if needed to 50 mg (or two tabs) twice daily.    5.  Start using My Fitness Pal to plan your meals ahead of time and to track you food intake.    6.  If you vitamin D is still low, I'll let you know what replacement dose I recommend.    I'll see you in October.  Let me know if you need anything before then.    Janet Castaneda NP  Endocrinology

## 2020-06-24 NOTE — LETTER
"    6/24/2020         RE: Keerthi Sloan  [address]  [facility] [address]        Dear Colleague,    Thank you for referring your patient, Keerthi Sloan, to the [facility]. Please see a copy of my visit note below.      Keerthi Sloan is a 50 year old female who is being evaluated via a billable video visit due to the COVID-19 pandemic.      The patient has been notified of following:     \"This video visit will be conducted via a call between you and your physician/provider. We have found that certain health care needs can be provided without the need for an in-person physical exam.  This service lets us provide the care you need with a video conversation.  If a prescription is necessary we can send it directly to your pharmacy.  If lab work is needed we can place an order for that and you can then stop by our lab to have the test done at a later time.    Video visits are billed at different rates depending on your insurance coverage.  Please reach out to your insurance provider with any questions.    If during the course of the call the physician/provider feels a video visit is not appropriate, you will not be charged for this service.\"    Patient has given verbal consent for Video visit? Yes    Will anyone else be joining your video visit? No        Video-Visit Details    Type of service:  Video Visit    Video Start Time: 3:36 pm  Video End Time: 3:55 pm    Originating Location (pt. Location): Other her car    Distant Location (provider location):  [facility]     Platform used for Video Visit: AmWell    Name: Keerthi Sloan  (Last seen 8/8/2018).  HPI:  Keerthi Sloan is a 50 year old female who presents via video visit for obesity, prediabetes, and vitamin D deficiency:      Obesity - She started gaining weight in her 30's.  Weight stabilized in the low 300's the past 5 years and she was unable to lose any weight with diet " efforts.  She considered gastric bypass and was being seen at [facility] [place] [facility] in anticipation of doing the procedure but she didn't feel comfortable doing the bypass so quit going to the clinic.      She was briefly treated with Phentermine 5/2016.  She discontinued this on her own after 1-2 months.  Was able to lose 51 lbs with diet efforts, 305-->288-->254 lbs, since 12/2015,then weight increased to 280 lbs, then 297 lbs; now over 300 lbs again. Previously working with [facility], the health .  Is interested in resuming weight loss medication.    + prediabetes - Previously treated with Metformin 500 mg bid.  A1c improved 6.1--->5.5%. Has been off metformin for > 1 year, would like to resume.  +iron deficiency - thought related to heavy menses, started oral iron 6/2016.  Evaluation and treatment through her PCP.  +vitamin D deficiency - D level 15, started vitamin D 50,000 IU weekly.  D improved to 21-22.  Has been off all D replacement for > 1 year.  Sleep Apnea/Snores:Yes: diagnosed with sleep apnea, uses CPAP  Hyperlipidemia:Yes: elevated triglycerides  Hirsutism:Yes: increased facial hair growth (chin)  Family history of Obesity:Yes: Mother had gastric bypass  Diet: Plans to resume diet efforts - track intake with My fitness pal.  Exercise: yes, plans to resume.   Other: elevated 11pm salivary cortisol.  1 mg Dexamethasone suppression test was normal.      PMH/PSH:  History reviewed. No pertinent past medical history.  Past Surgical History:   Procedure Laterality Date     GYN SURGERY       HERNIA REPAIR       TUBAL LIGATION  1997    LBTL     Family Hx:  Family History   Problem Relation Age of Onset     Diabetes Father      Hypertension Father      Kidney Disease Father      Diabetes Brother      Hypertension Brother      Thyroid disease: No         DM2: Yes: father         Autoimmune: DM1, SLE, RA, Vitiligo No    Social Hx:  Social History     Socioeconomic History     Marital status:      Spouse name: Not on  file     Number of children: 3     Years of education: Not on file     Highest education level: Not on file   Occupational History     Occupation:      Employer: [employer]   Social Needs     Financial resource strain: Not on file     Food insecurity     Worry: Not on file     Inability: Not on file     Transportation needs     Medical: Not on file     Non-medical: Not on file   Tobacco Use     Smoking status: Never Smoker     Smokeless tobacco: Never Used   Substance and Sexual Activity     Alcohol use: No     Alcohol/week: 0.0 standard drinks     Drug use: No     Sexual activity: Not Currently   Lifestyle     Physical activity     Days per week: Not on file     Minutes per session: Not on file     Stress: Not on file   Relationships     Social connections     Talks on phone: Not on file     Gets together: Not on file     Attends Baptism service: Not on file     Active member of club or organization: Not on file     Attends meetings of clubs or organizations: Not on file     Relationship status: Not on file     Intimate partner violence     Fear of current or ex partner: Not on file     Emotionally abused: Not on file     Physically abused: Not on file     Forced sexual activity: Not on file   Other Topics Concern     Parent/sibling w/ CABG, MI or angioplasty before 65F 55M? Not Asked   Social History Narrative     Not on file          MEDICATIONS:  has a current medication list which includes the following prescription(s): metformin, phentermine, topiramate, vitamin d3, ferrous sulfate, omeprazole, oxybutynin er, sertraline, sumatriptan, and trazodone.    ROS   ROS: 10 point ROS neg other than the symptoms noted above in the HPI.      Physical Exam   VS: There were no vitals taken for this visit.  GENERAL: NAD, well dressed, answering questions appropriately, appears stated age.  HEENT: no exopthalmous, no proptosis, no lig lag, no retraction, no scleral icterus  RESPIRATORY: Normal  "respiratory effort.  CARDIOVASCULAR:   EXTREMITIES:   NEUROLOGY:   MSK:   PSYCH: Intact judgment and insight. A&OX3 with a cordial affect.    LABS:  !COMPREHENSIVE Latest Ref Rng & Units 12/6/2017   SODIUM 133 - 144 mmol/L 142   POTASSIUM 3.4 - 5.3 mmol/L 3.9   CHLORIDE 94 - 109 mmol/L 106   BUN 7 - 30 mg/dL 15   Creatinine 0.52 - 1.04 mg/dL 0.70   Glucose 70 - 99 mg/dL 90   ANION GAP 3 - 14 mmol/L 10   CALCIUM 8.5 - 10.1 mg/dL 8.6   ALBUMIN 3.4 - 5.0 g/dL 3.6       Component      Latest Ref Rng & Units 5/26/2016 9/8/2016 1/23/2017 12/6/2017   Hemoglobin A1C      0 - 5.6 % 6.1 (H) 5.5 5.7 5.7     Component      Latest Ref Rng & Units 8/8/2018   Hemoglobin A1C      0 - 5.6 % 5.7 (H)     Component    Latest Ref Rng 5/26/2016   Saliva Collection Time     2300   Cortisol Saliva     0.129     1 mg Dexamethasone suppression test:  Component    Latest Ref Rng 6/17/2016   Cortisol Serum    4 - 22 ug/dL 0.8 (L)     Component    Latest Ref Rng 5/26/2016   Testosterone Total    8 - 60 ng/dL 4 (L)   Sex Hormone Binding Globulin    30 - 135 nmol/L 34   Free Testosterone Calculated    0.11 - 0.58 ng/dL 0.07 (L)   Estradiol     26   FSH     5.8   Lutropin     2.7     Component      Latest Ref Rng & Units 12/6/2017 8/8/2018   Vitamin D Deficiency screening      20 - 75 ug/L 14 (L) 17 (L)       Vital Signs 4/6/2018 7/3/2018 8/8/2018 8/8/2018   Weight (LB) 280 lb 288 lb 14.4 oz 297 lb 296 lb 14.4 oz   Height 5' 7\"      BMI (Calculated) 43.95        Vital Signs 6/11/2020   Weight (LB) 316 lb 12.8 oz   Height    BMI (Calculated)        All pertinent notes, labs, and images personally reviewed by me.     A/P  Ms.Kimberly CAROLYN Sloan is a 50 year old seen via video visit for management of:    1. IFG/prediabetes.  Fasting glucose elevated x 2, most recently in 2014.  + FH of type 2 diabetes.  No personal history of GD (3 pregnancies).  A1c elevated at 6.1%, improved to 5.5% on metformin.  Started Metformin  mg bid 6/2016, has been " off metformin for > 1 year.   Obtain fasting glucose and A1c.  Restart Metformin 500 mg bid.    2. Morbid Obesity with serious comorbidity.  Obesity-related comorbidity -  IFG/prediabetes and MARÍA.  Obesity is associated with a significant increase in mortality and risk of many disorders, including diabetes mellitus, hypertension, dyslipidemia, heart disease, stroke, sleep apnea, cancer, and many others. Conversely, weight loss is associated with a reduction in obesity-associated morbidity.    Endocrine evaluation of obesity includes Diabetes/prediabetes, Cushing's and thyroid dysfunction.  The relevant work up for the diagnosis and management of obesity and various treatment options were discussed. Body Mass Index (BMI) has been a standard means for calculating risk for overweight and obesity. The new American Association of Clinical Endocrinology (AACE) algorithm recommends lifestyle modifications as the initial phase of treatment for all patients with the BMI equal or greater than 25 kg/m2. Lifestyle modifications includes use of medical nutrition therapy, exercise, tobacco cessation, adequate quality and quantity of sleep, limited consumption of alcohol and reduced stress through implementation of a structured, multidisciplinary program.    In patients with complications associated with obesity, graded interventions are recommended including pharmacological therapy such as phentermine, orlistat, lorcaserin and phentermine/topiramate ER, contrave ( buproprion/naltreone) and the use of very low calorie meal replacement programs.    If medical intervention is insufficient, surgical therapy may be considered, especially in patients with BMI greater than or equal to 35 kg/m2 with multiple complications. Surgical treatments include lap-band, gastric sleeve or gastric bypass surgery.    She is considering surgical weight loss options.  Interested in transoral gastroplasty - procedure is done at [place].  Counseling  exercise ( V65.41)  Dietary counseling( V65.3)  Calculated BMI above the upper parameter and f/u plan was documented in the medical record.  Discussed indications, risks and benefits of all medications prescribed, and answered questions to patient's satisfaction.  Restart phentermine 37.5 mg every day and  Topamax to 25 mg bid.    3. Vitamin D deficiency.  Vitamin D level previously low at 15, improved to 21.  Started Vitamin D 50,000 IU weekly 6/2016. Off D replacement for > 1 year.   Obtain D level and restart D replacement if indicated.    Labs ordered today:   Orders Placed This Encounter   Procedures     Comprehensive metabolic panel     Hemoglobin A1c     Vitamin D Deficiency     Lipid panel reflex to direct LDL Fasting     Radiology/Consults ordered today: None      Follow-up:  3-4 months    Janet Castaneda NP  Endocrinology  [facility]  CC:   ____________________________________________________________      Again, thank you for allowing me to participate in the care of your patient.        Sincerely,        MADELEINE Vasquez CNP

## 2020-06-29 DIAGNOSIS — N94.6 DYSMENORRHEA: ICD-10-CM

## 2020-06-29 DIAGNOSIS — E55.9 VITAMIN D DEFICIENCY: ICD-10-CM

## 2020-06-29 DIAGNOSIS — R73.03 PREDIABETES: ICD-10-CM

## 2020-06-29 LAB
ALBUMIN SERPL-MCNC: 3.3 G/DL (ref 3.4–5)
ALP SERPL-CCNC: 47 U/L (ref 40–150)
ALT SERPL W P-5'-P-CCNC: 19 U/L (ref 0–50)
ANION GAP SERPL CALCULATED.3IONS-SCNC: 3 MMOL/L (ref 3–14)
AST SERPL W P-5'-P-CCNC: 13 U/L (ref 0–45)
BILIRUB SERPL-MCNC: 0.3 MG/DL (ref 0.2–1.3)
BUN SERPL-MCNC: 14 MG/DL (ref 7–30)
CALCIUM SERPL-MCNC: 8.3 MG/DL (ref 8.5–10.1)
CHLORIDE SERPL-SCNC: 102 MMOL/L (ref 94–109)
CHOLEST SERPL-MCNC: 191 MG/DL
CO2 SERPL-SCNC: 30 MMOL/L (ref 20–32)
CREAT SERPL-MCNC: 0.57 MG/DL (ref 0.52–1.04)
ERYTHROCYTE [DISTWIDTH] IN BLOOD BY AUTOMATED COUNT: 18.8 % (ref 10–15)
FERRITIN SERPL-MCNC: 2 NG/ML (ref 8–252)
GFR SERPL CREATININE-BSD FRML MDRD: >90 ML/MIN/{1.73_M2}
GLUCOSE SERPL-MCNC: 98 MG/DL (ref 70–99)
HBA1C MFR BLD: 5.9 % (ref 0–5.6)
HCT VFR BLD AUTO: 31.8 % (ref 35–47)
HDLC SERPL-MCNC: 57 MG/DL
HGB BLD-MCNC: 8.9 G/DL (ref 11.7–15.7)
IRON SATN MFR SERPL: 5 % (ref 15–46)
IRON SERPL-MCNC: 19 UG/DL (ref 35–180)
LDLC SERPL CALC-MCNC: 111 MG/DL
MCH RBC QN AUTO: 18.1 PG (ref 26.5–33)
MCHC RBC AUTO-ENTMCNC: 28 G/DL (ref 31.5–36.5)
MCV RBC AUTO: 65 FL (ref 78–100)
NONHDLC SERPL-MCNC: 134 MG/DL
PLATELET # BLD AUTO: 357 10E9/L (ref 150–450)
POTASSIUM SERPL-SCNC: 3.6 MMOL/L (ref 3.4–5.3)
PROT SERPL-MCNC: 7.5 G/DL (ref 6.8–8.8)
RBC # BLD AUTO: 4.91 10E12/L (ref 3.8–5.2)
SODIUM SERPL-SCNC: 135 MMOL/L (ref 133–144)
TIBC SERPL-MCNC: 355 UG/DL (ref 240–430)
TRIGL SERPL-MCNC: 113 MG/DL
WBC # BLD AUTO: 6.6 10E9/L (ref 4–11)

## 2020-06-29 PROCEDURE — 83540 ASSAY OF IRON: CPT | Performed by: CLINICAL NURSE SPECIALIST

## 2020-06-29 PROCEDURE — 80061 LIPID PANEL: CPT | Performed by: CLINICAL NURSE SPECIALIST

## 2020-06-29 PROCEDURE — 82306 VITAMIN D 25 HYDROXY: CPT | Performed by: CLINICAL NURSE SPECIALIST

## 2020-06-29 PROCEDURE — 36415 COLL VENOUS BLD VENIPUNCTURE: CPT | Performed by: CLINICAL NURSE SPECIALIST

## 2020-06-29 PROCEDURE — 83036 HEMOGLOBIN GLYCOSYLATED A1C: CPT | Performed by: CLINICAL NURSE SPECIALIST

## 2020-06-29 PROCEDURE — 85027 COMPLETE CBC AUTOMATED: CPT | Performed by: CLINICAL NURSE SPECIALIST

## 2020-06-29 PROCEDURE — 83550 IRON BINDING TEST: CPT | Performed by: CLINICAL NURSE SPECIALIST

## 2020-06-29 PROCEDURE — 82728 ASSAY OF FERRITIN: CPT | Performed by: CLINICAL NURSE SPECIALIST

## 2020-06-29 PROCEDURE — 80053 COMPREHEN METABOLIC PANEL: CPT | Performed by: CLINICAL NURSE SPECIALIST

## 2020-06-30 LAB — DEPRECATED CALCIDIOL+CALCIFEROL SERPL-MC: 16 UG/L (ref 20–75)

## 2020-07-01 RX ORDER — ERGOCALCIFEROL 1.25 MG/1
50000 CAPSULE, LIQUID FILLED ORAL WEEKLY
Qty: 12 CAPSULE | Refills: 1 | Status: SHIPPED | OUTPATIENT
Start: 2020-07-01 | End: 2020-09-17

## 2020-07-01 NOTE — RESULT ENCOUNTER NOTE
Veronica,  Your vitamin D is low again.    I recommend you restart D 50,000 units weekly.  Your calcium is also low - the low vitamin D is contributing to this so restarting D replacement should help.  Make sure your calcium intake is adequate - 3-4 servings of dairy per day -600 mg calcium twice daily.  Your A1c is stable at 5.7% this is still in the prediabetic range.  We already restarted the Metformin, so that will help.  Your LDL cholesterol is minimally elevated - diet, exercise, and weight loss can help improve this as well.  Here's a copy of your recent lab results for your records.  Janet Castaneda NP  Endocrinology

## 2020-07-02 ENCOUNTER — OFFICE VISIT (OUTPATIENT)
Dept: OBGYN | Facility: CLINIC | Age: 50
End: 2020-07-02
Payer: COMMERCIAL

## 2020-07-02 VITALS
WEIGHT: 293 LBS | SYSTOLIC BLOOD PRESSURE: 114 MMHG | HEART RATE: 80 BPM | OXYGEN SATURATION: 99 % | HEIGHT: 67 IN | BODY MASS INDEX: 45.99 KG/M2 | DIASTOLIC BLOOD PRESSURE: 77 MMHG | TEMPERATURE: 97.6 F

## 2020-07-02 DIAGNOSIS — D25.0 INTRAMURAL, SUBMUCOUS, AND SUBSEROUS LEIOMYOMA OF UTERUS: ICD-10-CM

## 2020-07-02 DIAGNOSIS — D25.1 INTRAMURAL, SUBMUCOUS, AND SUBSEROUS LEIOMYOMA OF UTERUS: ICD-10-CM

## 2020-07-02 DIAGNOSIS — D25.2 INTRAMURAL, SUBMUCOUS, AND SUBSEROUS LEIOMYOMA OF UTERUS: ICD-10-CM

## 2020-07-02 DIAGNOSIS — R93.89 THICKENED ENDOMETRIUM: ICD-10-CM

## 2020-07-02 DIAGNOSIS — N93.9 ABNORMAL UTERINE BLEEDING (AUB): Primary | ICD-10-CM

## 2020-07-02 PROCEDURE — 88305 TISSUE EXAM BY PATHOLOGIST: CPT | Performed by: OBSTETRICS & GYNECOLOGY

## 2020-07-02 PROCEDURE — 99203 OFFICE O/P NEW LOW 30 MIN: CPT | Mod: 25 | Performed by: OBSTETRICS & GYNECOLOGY

## 2020-07-02 PROCEDURE — 58100 BIOPSY OF UTERUS LINING: CPT | Performed by: OBSTETRICS & GYNECOLOGY

## 2020-07-02 ASSESSMENT — MIFFLIN-ST. JEOR: SCORE: 2104.61

## 2020-07-02 NOTE — TELEPHONE ENCOUNTER
Patient read MyHCart msg with no reply.    Thank you,  Helen MAGANA  [facility]  Team Julita Coordinator

## 2020-07-02 NOTE — Clinical Note
Walker Marrero,   Thanks for sending Veronica to see me.   We are planning hysterectomy, likely open. I am going to hopefully talk to her tomorrow.  We will need to get hgb up before surgery.   Edita

## 2020-07-02 NOTE — PROGRESS NOTES
CC/HPI:  50 year old  presents for discussion of abnormal periods and pain.  Periods started getting irregular around the beginning of the year.   Now still 30 days, but getting another cycle in the middle. That one is lighter. Seems like old blood.   Getting RLQ pain. Lightning bolts of pain that buckle her over. A few days of the week.   No changes in appetite, bowel movements, abdominal girth.   Went in ER in May, excruciating pain. Felt really backed up then, but not from bowels.   Pain is worse during periods but not only during periods  Had endometrial ablation in . Doesn't remember being told she had fibroids at that time.       Past Medical History:   Diagnosis Date     Anemia      Fibroids      Obesity      Prediabetes      Sleep apnea        Past Surgical History:   Procedure Laterality Date     GYN SURGERY: ablation       HERNIA REPAIR       TUBAL LIGATION      LBTL       Family History   Problem Relation Age of Onset     Diabetes Father      Hypertension Father      Kidney Disease Father      Diabetes Brother      Hypertension Brother        Social History     Socioeconomic History     Marital status:      Spouse name: Not on file     Number of children: 3     Years of education: Not on file     Highest education level: Not on file   Occupational History     Occupation:      Employer: [employer]   Social Needs     Financial resource strain: Not on file     Food insecurity     Worry: Not on file     Inability: Not on file     Transportation needs     Medical: Not on file     Non-medical: Not on file   Tobacco Use     Smoking status: Never Smoker     Smokeless tobacco: Never Used   Substance and Sexual Activity     Alcohol use: No     Alcohol/week: 0.0 standard drinks     Drug use: No     Sexual activity: Not Currently   Lifestyle     Physical activity     Days per week: Not on file     Minutes per session: Not on file     Stress: Not on file    Relationships     Social connections     Talks on phone: Not on file     Gets together: Not on file     Attends Mosque service: Not on file     Active member of club or organization: Not on file     Attends meetings of clubs or organizations: Not on file     Relationship status: Not on file     Intimate partner violence     Fear of current or ex partner: Not on file     Emotionally abused: Not on file     Physically abused: Not on file     Forced sexual activity: Not on file   Other Topics Concern     Parent/sibling w/ CABG, MI or angioplasty before 65F 55M? Not Asked   Social History Narrative     Not on file         Current Outpatient Medications:      cholecalciferol (VITAMIN D3) 18773 units capsule, Take 1 capsule (50,000 Units) by mouth once a week, Disp: 12 capsule, Rfl: 1     Ferrous Sulfate 27 MG TABS, 1 tab QD, Disp: , Rfl:      metFORMIN (GLUCOPHAGE) 500 MG tablet, Take 1 tablet (500 mg) by mouth 2 times daily (with meals), Disp: 180 tablet, Rfl: 1     omeprazole (PRILOSEC) 40 MG capsule, , Disp: , Rfl:      oxybutynin (DITROPAN-XL) 10 MG 24 hr tablet, Take 10 mg by mouth, Disp: , Rfl:      phentermine (ADIPEX-P) 37.5 MG tablet, Take 1 tablet (37.5 mg) by mouth every morning (before breakfast), Disp: 32 tablet, Rfl: 3     sertraline (ZOLOFT) 100 MG tablet, Take 1.5 tablets (150 mg) by mouth daily, Disp: 135 tablet, Rfl: 1     SUMAtriptan (IMITREX) 25 MG tablet, Take 1 tablet (25 mg) by mouth at onset of headache for migraine May repeat in 2 hours. Max 8 tablets/24 hours., Disp: 18 tablet, Rfl: 3     topiramate (TOPAMAX) 25 MG tablet, 1-2 tabs bid, Disp: 360 tablet, Rfl: 1     traZODone (DESYREL) 150 MG tablet, Take 1 tablet (150 mg) by mouth nightly as needed for sleep, Disp: 90 tablet, Rfl: 1     vitamin D2 (ERGOCALCIFEROL) 44760 units (1250 mcg) capsule, Take 1 capsule (50,000 Units) by mouth once a week, Disp: 12 capsule, Rfl: 1    No Known Allergies    Exam:  Vitals:    07/02/20 0918   BP: 114/77  "  Pulse: 80   Temp: 97.6  F (36.4  C)   TempSrc: Oral   SpO2: 99%   Weight: 144.2 kg (317 lb 14.4 oz)   Height: 1.702 m (5' 7\")     Body mass index is 49.79 kg/m .     Exam:  Constitutional: healthy, alert, no distress and over weight  Head: Normocephalic. No masses, lesions, tenderness or abnormalities  Gastrointestinal: Abdomen soft, non-tender. BS normal. Uterus appreciated at 2 below U  : Normal external genitalia without lesions and cervix parous. Cervix high and deviated to patient's right. Even with tenaculum difficult to access for EMB. Minimal descent.  Psychiatric: mentation appears normal and affect normal/bright      ULTRASOUND PELVIC COMPLETE WITH TRANSVAGINAL IMAGING  6/19/2020 1:56  PM     CLINICAL HISTORY: Menorrhagia and dysmenorrhea, history of fibroids.   Uterine leiomyoma, unspecified location.     TECHNIQUE: Transabdominal scans were performed. Endovaginal ultrasound  was performed to better visualize the adnexa.     COMPARISON: None.     FINDINGS:     UTERUS: 16.6 x 13.7 x 7.9 cm. Uterus is enlarged. There is a large  heterogeneous submucosal mass that occupies much of the central uterus  and measures 8.0 x 9.1 x 4.4 cm, compatible with a large fibroid.  There is a second intramural fibroid in the lower uterine segment that  measures 4.7 x 3.9 x 4.3 cm.     ENDOMETRIUM: 27 mm. The endometrium is thickened and hyperechoic.     RIGHT OVARY: Not visualized due to intestinal gas.     LEFT OVARY: Not visualized due to intestinal gas.     No significant free fluid.                                                                      IMPRESSION:  1.  Enlarged fibroid uterus.  2.  Abnormal thickened and echogenic endometrium. This may be related  to secretory phase and relative obstruction by fibroid. Endometrial  neoplasia cannot be excluded. Consider endometrial biopsy and/or  pelvic MRI with contrast.     SAVANNAH ASENCIO MD        IMPRESSION: Interval increased size of anterior left uterine " fibroid compared to prior   Result Narrative   COMPARISON:  08/27/2012    TECHNIQUE:  Transabdominal and transvaginal imaging was performed.    FINDINGS:    Uterus measures 14.1 x 10.9 x 8.4 cm with a 17 mm endometrial thickness. Single high left uterine fibroid measuring 6.7 x 6.6 x 6.5 cm is present. When compared to previous, this has significantly increased in size.    The bilateral ovaries are normal. No free fluid.   Other Result Information   Interface, In  Pn Radiology Results - 06/19/2016  1:11 AM CDT  COMPARISON:  08/27/2012    TECHNIQUE:  Transabdominal and transvaginal imaging was performed.    FINDINGS:    Uterus measures 14.1 x 10.9 x 8.4 cm with a 17 mm endometrial thickness. Single high left uterine fibroid measuring 6.7 x 6.6 x 6.5 cm is present. When compared to previous, this has significantly increased in size.    The bilateral ovaries are normal. No free fluid.    IMPRESSION  IMPRESSION: Interval increased size of anterior left uterine fibroid compared to prior     Component      Latest Ref Rng & Units 6/29/2020   Sodium      133 - 144 mmol/L 135   Potassium      3.4 - 5.3 mmol/L 3.6   Chloride      94 - 109 mmol/L 102   Carbon Dioxide      20 - 32 mmol/L 30   Anion Gap      3 - 14 mmol/L 3   Glucose      70 - 99 mg/dL 98   Urea Nitrogen      7 - 30 mg/dL 14   Creatinine      0.52 - 1.04 mg/dL 0.57   GFR Estimate      >60 mL/min/1.73:m2 >90   GFR Estimate If Black      >60 mL/min/1.73:m2 >90   Calcium      8.5 - 10.1 mg/dL 8.3 (L)   Bilirubin Total      0.2 - 1.3 mg/dL 0.3   Albumin      3.4 - 5.0 g/dL 3.3 (L)   Protein Total      6.8 - 8.8 g/dL 7.5   Alkaline Phosphatase      40 - 150 U/L 47   ALT      0 - 50 U/L 19   AST      0 - 45 U/L 13   WBC      4.0 - 11.0 10e9/L 6.6   RBC Count      3.8 - 5.2 10e12/L 4.91   Hemoglobin      11.7 - 15.7 g/dL 8.9 (L)   Hematocrit      35.0 - 47.0 % 31.8 (L)   MCV      78 - 100 fl 65 (L)   MCH      26.5 - 33.0 pg 18.1 (L)   MCHC      31.5 - 36.5 g/dL 28.0  (L)   RDW      10.0 - 15.0 % 18.8 (H)   Platelet Count      150 - 450 10e9/L 357   Cholesterol      <200 mg/dL 191   Triglycerides      <150 mg/dL 113   HDL Cholesterol      >49 mg/dL 57   LDL Cholesterol Calculated      <100 mg/dL 111 (H)   Non HDL Cholesterol      <130 mg/dL 134 (H)   Iron      35 - 180 ug/dL 19 (L)   Iron Binding Cap      240 - 430 ug/dL 355   Iron Saturation Index      15 - 46 % 5 (L)   Vitamin D Deficiency screening      20 - 75 ug/L 16 (L)   Hemoglobin A1C      0 - 5.6 % 5.9 (H)   Ferritin      8 - 252 ng/mL 2 (L)       Procedure: Endometrial biopsy.   After informed consent was obtained, the cervix was cleansed with betadyne, grasped with a tenaculum.  The pipel was inserted easily and four quadrant sampling was done.  Sample was sent for pathology.    Tenaculum removed, hemostasis achieved with simple pressure, minimal bleeding. Patient tolerated procedure well.    A/P:  50 year old  with   (N93.9) Abnormal uterine bleeding (AUB)  (primary encounter diagnosis)  Comment:   Plan: Given failed ablation and submucous myoma, Mirena IUD likely to fail as well. Patient has significant anemia from blood loss. She desires definitive surgical management with hysterectomy.   Briefly discussed surgical route.   Ultrasound located in [facility] after patient left clinic. Initially I had concern for leiomyosarcoma when patient reported this is a potentially new fibroid and is 8 cm. Discussed this concern with the patient. Now with evidence of prior 6-7 cm myoma, this risk is lower but still possible. Prefer to avoid morcellation due to this risk.   Discussed open procedure versus referral for robotic procedure. TVH would be ideal but minimal descent with EMB.  I will call her with EMB results for further discussion      (R93.89) Thickened endometrium  Comment:   Plan: Surgical pathology exam, ENDOMETRIAL BIOPSY W/O        CERVICAL DILATION            (D25.1,  D25.0,  D25.2) Intramural, submucous,  and subserous leiomyoma of uterus  Comment:   Plan: Ultrasound images and report reviewed with patient.       30 min was spent with this patient that does not include time spent on the procedure and over 50 % of the 30 min was spent counseling on above issues

## 2020-07-07 LAB — COPATH REPORT: NORMAL

## 2020-07-11 ENCOUNTER — HOSPITAL ENCOUNTER (EMERGENCY)
Facility: CLINIC | Age: 50
Discharge: HOME | End: 2020-07-11
Attending: NURSE PRACTITIONER | Admitting: NURSE PRACTITIONER
Payer: COMMERCIAL

## 2020-07-11 VITALS
TEMPERATURE: 98.6 F | DIASTOLIC BLOOD PRESSURE: 78 MMHG | OXYGEN SATURATION: 98 % | BODY MASS INDEX: 50.12 KG/M2 | HEART RATE: 80 BPM | RESPIRATION RATE: 16 BRPM | SYSTOLIC BLOOD PRESSURE: 135 MMHG | WEIGHT: 293 LBS

## 2020-07-11 DIAGNOSIS — M54.50 LOW BACK PAIN: ICD-10-CM

## 2020-07-11 DIAGNOSIS — N94.6 DYSMENORRHEA: ICD-10-CM

## 2020-07-11 LAB
ANION GAP SERPL CALCULATED.3IONS-SCNC: 6 MMOL/L (ref 3–14)
BUN SERPL-MCNC: 12 MG/DL (ref 7–30)
CALCIUM SERPL-MCNC: 8.5 MG/DL (ref 8.5–10.1)
CHLORIDE SERPL-SCNC: 106 MMOL/L (ref 94–109)
CO2 SERPL-SCNC: 26 MMOL/L (ref 20–32)
CREAT SERPL-MCNC: 0.64 MG/DL (ref 0.52–1.04)
ERYTHROCYTE [DISTWIDTH] IN BLOOD BY AUTOMATED COUNT: 18.6 % (ref 10–15)
GFR SERPL CREATININE-BSD FRML MDRD: >90 ML/MIN/{1.73_M2}
GLUCOSE SERPL-MCNC: 101 MG/DL (ref 70–99)
HCT VFR BLD AUTO: 35.5 % (ref 35–47)
HGB BLD-MCNC: 9.3 G/DL (ref 11.7–15.7)
MCH RBC QN AUTO: 17.7 PG (ref 26.5–33)
MCHC RBC AUTO-ENTMCNC: 26.2 G/DL (ref 31.5–36.5)
MCV RBC AUTO: 68 FL (ref 78–100)
PLATELET # BLD AUTO: 166 10E9/L (ref 150–450)
POTASSIUM SERPL-SCNC: 3.7 MMOL/L (ref 3.4–5.3)
RBC # BLD AUTO: 5.24 10E12/L (ref 3.8–5.2)
SODIUM SERPL-SCNC: 138 MMOL/L (ref 133–144)
WBC # BLD AUTO: 6.8 10E9/L (ref 4–11)

## 2020-07-11 PROCEDURE — 96374 THER/PROPH/DIAG INJ IV PUSH: CPT

## 2020-07-11 PROCEDURE — 85027 COMPLETE CBC AUTOMATED: CPT | Performed by: NURSE PRACTITIONER

## 2020-07-11 PROCEDURE — 99284 EMERGENCY DEPT VISIT MOD MDM: CPT | Mod: 25

## 2020-07-11 PROCEDURE — 25000128 H RX IP 250 OP 636: Performed by: NURSE PRACTITIONER

## 2020-07-11 PROCEDURE — 80048 BASIC METABOLIC PNL TOTAL CA: CPT | Performed by: NURSE PRACTITIONER

## 2020-07-11 PROCEDURE — 25000132 ZZH RX MED GY IP 250 OP 250 PS 637: Performed by: NURSE PRACTITIONER

## 2020-07-11 RX ORDER — METHOCARBAMOL 500 MG/1
1000 TABLET, FILM COATED ORAL ONCE
Status: COMPLETED | OUTPATIENT
Start: 2020-07-11 | End: 2020-07-11

## 2020-07-11 RX ORDER — LIDOCAINE 4 G/G
2 PATCH TOPICAL ONCE
Status: DISCONTINUED | OUTPATIENT
Start: 2020-07-11 | End: 2020-07-11 | Stop reason: HOSPADM

## 2020-07-11 RX ORDER — METHOCARBAMOL 500 MG/1
TABLET, FILM COATED ORAL
Qty: 24 TABLET | Refills: 0 | Status: SHIPPED | OUTPATIENT
Start: 2020-07-11 | End: 2021-05-25

## 2020-07-11 RX ORDER — HYDROCODONE BITARTRATE AND ACETAMINOPHEN 5; 325 MG/1; MG/1
1 TABLET ORAL EVERY 6 HOURS PRN
Qty: 10 TABLET | Refills: 0 | Status: SHIPPED | OUTPATIENT
Start: 2020-07-11 | End: 2020-07-14

## 2020-07-11 RX ORDER — KETOROLAC TROMETHAMINE 15 MG/ML
15 INJECTION, SOLUTION INTRAMUSCULAR; INTRAVENOUS ONCE
Status: COMPLETED | OUTPATIENT
Start: 2020-07-11 | End: 2020-07-11

## 2020-07-11 RX ADMIN — LIDOCAINE 2 PATCH: 560 PATCH PERCUTANEOUS; TOPICAL; TRANSDERMAL at 15:00

## 2020-07-11 RX ADMIN — METHOCARBAMOL TABLETS 1000 MG: 500 TABLET, COATED ORAL at 14:59

## 2020-07-11 RX ADMIN — KETOROLAC TROMETHAMINE 15 MG: 15 INJECTION, SOLUTION INTRAMUSCULAR; INTRAVENOUS at 14:50

## 2020-07-11 ASSESSMENT — ENCOUNTER SYMPTOMS
VOMITING: 0
FEVER: 0
SHORTNESS OF BREATH: 0
NAUSEA: 0
HEADACHES: 0
BACK PAIN: 1
DYSURIA: 0
ABDOMINAL PAIN: 0

## 2020-07-11 NOTE — ED PROVIDER NOTES
"  History   Chief Complaint:  Vaginal Bleeding       The history is provided by the patient.      Keerthi Sloan is a 50 year old female with who presents for evaluation of vaginal bleeding.  She has a history of dysmenorrhea and uterine fibroids.  Recently diagnosed with large uterine fibroids and possible cancerous findings.  Biopsy is pending at this point.  This morning she awoke with a \"pool of blood.\"  Also complaining of low back pain radiating to her legs bilaterally.  She does have a history of iron deficiency anemia and on the 29th of last month had her labs rechecked with a hemoglobin of 8.9 and again low iron levels.  She denies any other back problems.  No nausea or vomiting.  Discomfort associated with cramping and bleeding.  She is in the works to have a total hysterectomy.  They were waiting upon results from recent biopsy.    Allergies:  No known drug allergies     Medications:   Metformin   Omeprazole   Phentermine  Sertraline   Trazodone  Topamax   Oxybutynin   Imitrex    Past Medical History:    Anemia  Fibroids  Obesity  Prediabetes   Sleep apnea  Binge eating   Dysmenorrhea  Bilary T-Tube placement  MARÍA  PTSD  Adjustment disorder with mixed anxiety and depressed mood  Insomnia     Past Surgical History:    Gyn surgery   Hernia repair   Tubal ligation     Family History:    Diabetes, father  Hypertension, father   CKD, father  DM, brother   Hypertension, brother    Social History:  Smoking status: never smoker  Alcohol use: No  Drug use: No  PCP: Elida Cameron  Presents to the ED with self  Up to date on immunization    Review of Systems   Constitutional: Negative for fever.   Respiratory: Negative for shortness of breath.    Gastrointestinal: Negative for abdominal pain, nausea and vomiting.   Genitourinary: Positive for vaginal bleeding. Negative for dysuria.   Musculoskeletal: Positive for back pain.   Neurological: Negative for headaches.   All other systems reviewed and are " negative.        Physical Exam     Patient Vitals for the past 24 hrs:   BP Temp Heart Rate Resp SpO2 Weight   07/11/20 1402 (!) 140/98 98.6  F (37  C) 64 18 99 % 145.2 kg (320 lb)       Physical Exam  General: Alert, Moderate discomfort, well kept, morbidly obese   Eyes: PERRL, conjunctivae pink no scleral icterus or conjunctival injection  ENT:   Moist mucus membranes, posterior oropharynx clear without erythema or exudates, No lymphadenopathy, Normal voice  Resp:  Lungs clear to auscultation bilaterally, no crackles/rubs/wheezes. Good air movement  CV:  Normal rate and rhythm, no murmurs/rubs/gallops  GI:  Abdomen soft and non-distended.  Normoactive BS.  Mild suprapubic tenderness, No guarding or rebound, No masses  : Normal external genitalia.  Small amount of blood in vault.  A couple of very small clots.  No cervical motion tenderness.  Skin:  Warm, dry.  No rashes or petechiae  Musculoskeletal: No peripheral edema or calf tenderness, Normal gross ROM, mild lumbosacral bilateral paravertebral spasming  Neuro: Alert and oriented to person/place/time, normal sensation  Psychiatric: Normal affect, cooperative, good eye contact    Emergency Department Course     Imaging:  Radiology findings were communicated with the patient who voiced understanding of the findings.    No orders to display        Laboratory:  Laboratory findings were communicated with the patient who voiced understanding of the findings.    Labs Ordered and Resulted from Time of ED Arrival Up to the Time of Departure from the ED   CBC WITH PLATELETS - Abnormal; Notable for the following components:       Result Value    RBC Count 5.24 (*)     Hemoglobin 9.3 (*)     MCV 68 (*)     MCH 17.7 (*)     MCHC 26.2 (*)     RDW 18.6 (*)     All other components within normal limits   BASIC METABOLIC PANEL - Abnormal; Notable for the following components:    Glucose 101 (*)     All other components within normal limits   MAY SALINE LOCK IV      "    Procedures:    Interventions:  1450  Toradol 15 mg, IV   1459 Methocarbamol, 1000 mg, PO  1500 Lidocaine patch 4 %, 2 patches, transdermal       Medications   Lidocaine (LIDOCARE) 4 % Patch 2 patch (2 patches Transdermal Patch/Med Applied 7/11/20 1500)   ketorolac (TORADOL) injection 15 mg (15 mg Intravenous Given 7/11/20 1450)   methocarbamol (ROBAXIN) tablet 1,000 mg (1,000 mg Oral Given 7/11/20 1459)       Emergency Department Course:  Past medical records, nursing notes, and vitals reviewed.     I performed an exam of the patient as documented above.     IV was inserted and blood was drawn for laboratory testing, results above.      Impression & Plan     Medical Decision Making:  Keerthi Sloan is a 50 year old female who presents today for evaluation of heavy vaginal bleeding.  She has a history of dysmenorrhea and fibroids.  Today she awoke with a \"pool of blood.\"  This caused her to present for evaluation.  Her examination today shows a stable, slightly improved hemoglobin from a couple of weeks ago.  The remainder of her laboratory studies are within normal limits for patient.  She recently had a biopsy and ultrasound.  No indication for pregnancy given recent studies and history.  I was able to find the report for patient's recent biopsy.  There was no indication for cancer.  I discussed these findings with patient.  Patient was given the above treatment with good relief from her symptoms and feels much improved.  Plan will be for patient to be discharged home with a prescription for methocarbamol and Norco if needed for more intense pain.  She plans to contact her obstetrician on Monday to schedule her hysterectomy.  Strict return protocols were discussed.  She appears to be safe and appropriate for outpatient management follow-up and is discharged home.      Diagnosis:    ICD-10-CM    1. Dysmenorrhea  N94.6    2. Low back pain  M54.5        Disposition:  Discharged to home.    Discharge " Medications:  New Prescriptions    HYDROCODONE-ACETAMINOPHEN (NORCO) 5-325 MG TABLET    Take 1 tablet by mouth every 6 hours as needed for severe pain    METHOCARBAMOL (ROBAXIN) 500 MG TABLET    1-2 tabs q TID PRN for muscle spasm/pain       Scribe Disclosure:  I, Angelica Long, am serving as a scribe at 2:16 PM on 7/11/2020 to document services personally performed by Alistair Deluna APRN based on my observations and the provider's statements to me.    [facility] EMERGENCY DEPARTMENT        Alistair Deluna APRN CNP  07/11/20 1600

## 2020-07-11 NOTE — ED AVS SNAPSHOT
[facility] Emergency Department  [address]  [facility] [address]  Phone:  [phone]  Fax:  [phone]                                    Keerthi Sloan   MRN: [id number]    Department:  [facility] Emergency Department   Date of Visit:  7/11/2020           After Visit Summary Signature Page    I have received my discharge instructions, and my questions have been answered. I have discussed any challenges I see with this plan with the nurse or doctor.    ..........................................................................................................................................  Patient/Patient Representative Signature      ..........................................................................................................................................  Patient Representative Print Name and Relationship to Patient    ..................................................               ................................................  Date                                   Time    ..........................................................................................................................................  Reviewed by Signature/Title    ...................................................              ..............................................  Date                                               Time          22EPIC Rev 08/18

## 2020-07-13 ENCOUNTER — TELEPHONE (OUTPATIENT)
Dept: FAMILY MEDICINE | Facility: CLINIC | Age: 50
End: 2020-07-13

## 2020-07-13 DIAGNOSIS — D25.0 INTRAMURAL AND SUBMUCOUS LEIOMYOMA OF UTERUS: Primary | ICD-10-CM

## 2020-07-13 DIAGNOSIS — D25.1 INTRAMURAL AND SUBMUCOUS LEIOMYOMA OF UTERUS: Primary | ICD-10-CM

## 2020-07-13 DIAGNOSIS — D50.0 IRON DEFICIENCY ANEMIA DUE TO CHRONIC BLOOD LOSS: ICD-10-CM

## 2020-07-13 RX ORDER — HEPARIN SODIUM,PORCINE 10 UNIT/ML
5 VIAL (ML) INTRAVENOUS
Status: CANCELLED | OUTPATIENT
Start: 2020-07-14

## 2020-07-13 RX ORDER — HEPARIN SODIUM (PORCINE) LOCK FLUSH IV SOLN 100 UNIT/ML 100 UNIT/ML
5 SOLUTION INTRAVENOUS
Status: CANCELLED | OUTPATIENT
Start: 2020-07-14

## 2020-07-13 NOTE — TELEPHONE ENCOUNTER
Reason for Call:  Request for results:    Name of test or procedure:  Pelvic Biopsy     Date of test of procedure: 07/02/20    Location of the test or procedure:  Patient had a biopsy with Dr Edita Worthington and when patient called the [facility] they said they did not see any results for this biopsy. Patient was hoping Dr Cameron could get the results.    OK to leave the result message on voice mail or with a family member? YES    Phone number Patient can be reached at:  Home number on file [phone] (home)    Additional comments: any    Call taken on 7/13/2020 at 11:36 AM by Yessenia Gasca

## 2020-07-13 NOTE — TELEPHONE ENCOUNTER
Location of the test or procedure:  Patient had a biopsy with Dr Edita Worthington and when patient called the [facility] they said they did not see any results for this biopsy. Patient was hoping Dr Cameron could get the results.    Results in Epic, however no result note attached. Routed to PCP to review and advise.     Precious Worthington, RN, BSN, PHN  [facility] [place]

## 2020-07-13 NOTE — TELEPHONE ENCOUNTER
I see the results are in the chart.  Will send message to Dr. Worthington so her team can address and discuss plan.

## 2020-07-15 ENCOUNTER — TELEPHONE (OUTPATIENT)
Dept: FAMILY MEDICINE | Facility: CLINIC | Age: 50
End: 2020-07-15

## 2020-07-15 ENCOUNTER — MYC MEDICAL ADVICE (OUTPATIENT)
Dept: FAMILY MEDICINE | Facility: CLINIC | Age: 50
End: 2020-07-15

## 2020-07-15 DIAGNOSIS — D25.9 UTERINE LEIOMYOMA, UNSPECIFIED LOCATION: ICD-10-CM

## 2020-07-15 DIAGNOSIS — N94.6 DYSMENORRHEA: Primary | ICD-10-CM

## 2020-07-15 NOTE — TELEPHONE ENCOUNTER
Reason for Call:  Other     Detailed comments: Patient was told per Dr Worthington that she needs iron infusions prior to having hysterectomy. Would need orders placed for this and patient is in extreme pain and would like to schedule surgery as soon as possible. Once orders are placed please contact patient and let her know ASAP     Phone Number Patient can be reached at: Home number on file [phone] (home)    Best Time:     Can we leave a detailed message on this number? YES    Call taken on 7/15/2020 at 1:13 PM by Christal Abarca

## 2020-07-15 NOTE — TELEPHONE ENCOUNTER
TC to pt. Gave number for pt to schedule in both [facility] and [place].  Tina Painter RN

## 2020-07-15 NOTE — TELEPHONE ENCOUNTER
Called patient and she stated she has not heard from Dr Worthington's team regarding her infusions that she was told they would be setting up for her. She is asking that we reach out to the [facility] regarding this. Nurse advised this message will be sent letting them know she is waiting for them to call to get infusions going.       Thank you.   Deja Chávez RN

## 2020-07-16 NOTE — TELEPHONE ENCOUNTER
Called pt back.  She isn't feeling 100% comfortable with Dr. Worthington's office right now.  Having a lot of trouble getting in touch with their office which has been really frustrating for her.  Most recently, she was supposed to get IV iron infusions and she's been having a very frustrating time scheduling that with [place].  She ended up getting this scheduled at [facility] much more easily.  She lives down in [place], so it would be much easier for her to see an OB/Gyn in the [facility] anyway.  I recommended the [facility] to her and she will call them to get an appt scheduled.

## 2020-07-20 ENCOUNTER — INFUSION THERAPY VISIT (OUTPATIENT)
Dept: INFUSION THERAPY | Facility: CLINIC | Age: 50
End: 2020-07-20
Attending: OBSTETRICS & GYNECOLOGY
Payer: COMMERCIAL

## 2020-07-20 VITALS
SYSTOLIC BLOOD PRESSURE: 120 MMHG | TEMPERATURE: 98.4 F | HEART RATE: 77 BPM | RESPIRATION RATE: 16 BRPM | DIASTOLIC BLOOD PRESSURE: 72 MMHG | OXYGEN SATURATION: 98 %

## 2020-07-20 DIAGNOSIS — D50.0 IRON DEFICIENCY ANEMIA DUE TO CHRONIC BLOOD LOSS: Primary | ICD-10-CM

## 2020-07-20 PROCEDURE — 96365 THER/PROPH/DIAG IV INF INIT: CPT

## 2020-07-20 PROCEDURE — 25000128 H RX IP 250 OP 636: Performed by: OBSTETRICS & GYNECOLOGY

## 2020-07-20 PROCEDURE — 25800030 ZZH RX IP 258 OP 636: Performed by: OBSTETRICS & GYNECOLOGY

## 2020-07-20 RX ORDER — HEPARIN SODIUM,PORCINE 10 UNIT/ML
5 VIAL (ML) INTRAVENOUS
Status: CANCELLED | OUTPATIENT
Start: 2020-07-22

## 2020-07-20 RX ORDER — HEPARIN SODIUM (PORCINE) LOCK FLUSH IV SOLN 100 UNIT/ML 100 UNIT/ML
5 SOLUTION INTRAVENOUS
Status: CANCELLED | OUTPATIENT
Start: 2020-07-22

## 2020-07-20 RX ORDER — HYDROCODONE BITARTRATE AND ACETAMINOPHEN 5; 325 MG/1; MG/1
1 TABLET ORAL EVERY 4 HOURS PRN
COMMUNITY
End: 2020-09-17

## 2020-07-20 RX ADMIN — SODIUM CHLORIDE 250 ML: 9 INJECTION, SOLUTION INTRAVENOUS at 11:41

## 2020-07-20 RX ADMIN — IRON SUCROSE 200 MG: 20 INJECTION, SOLUTION INTRAVENOUS at 11:41

## 2020-07-20 NOTE — PROGRESS NOTES
Infusion Nursing Note:  Keerthi Sloan presents today for Venofer.    Patient seen by provider today: No    Note: First time getting Venofer today. Oriented to infusion center. Venofer teaching, side effects, and schedule reviewed with patient.  Pt stated understanding of plan.     Intravenous Access:  Peripheral IV placed.      Treatment Conditions:  Not Applicable.      Post Infusion Assessment:  Patient tolerated infusion without incident.  Blood return noted pre and post infusion.  Site patent and intact, free from redness, edema or discomfort.  No evidence of extravasations.  Access discontinued per protocol.    Discharge Plan:   Patient declined prescription refills.  Discharge instructions reviewed with: Patient.  Patient and/or family verbalized understanding of discharge instructions and all questions answered.  Copy of AVS reviewed with patient and/or family.  Patient will return 7/22/20 for next appointment.  Patient discharged in stable condition accompanied by: self.  Departure Mode: Ambulatory.    Shana Moore RN

## 2020-07-22 ENCOUNTER — INFUSION THERAPY VISIT (OUTPATIENT)
Dept: INFUSION THERAPY | Facility: CLINIC | Age: 50
End: 2020-07-22
Attending: OBSTETRICS & GYNECOLOGY
Payer: COMMERCIAL

## 2020-07-22 VITALS
DIASTOLIC BLOOD PRESSURE: 73 MMHG | HEART RATE: 80 BPM | RESPIRATION RATE: 18 BRPM | TEMPERATURE: 97.8 F | SYSTOLIC BLOOD PRESSURE: 135 MMHG

## 2020-07-22 DIAGNOSIS — D50.0 IRON DEFICIENCY ANEMIA DUE TO CHRONIC BLOOD LOSS: Primary | ICD-10-CM

## 2020-07-22 PROCEDURE — 96365 THER/PROPH/DIAG IV INF INIT: CPT

## 2020-07-22 PROCEDURE — 25000128 H RX IP 250 OP 636: Performed by: OBSTETRICS & GYNECOLOGY

## 2020-07-22 PROCEDURE — 25800030 ZZH RX IP 258 OP 636: Performed by: OBSTETRICS & GYNECOLOGY

## 2020-07-22 RX ORDER — HEPARIN SODIUM (PORCINE) LOCK FLUSH IV SOLN 100 UNIT/ML 100 UNIT/ML
5 SOLUTION INTRAVENOUS
Status: CANCELLED | OUTPATIENT
Start: 2020-07-24

## 2020-07-22 RX ORDER — HEPARIN SODIUM,PORCINE 10 UNIT/ML
5 VIAL (ML) INTRAVENOUS
Status: CANCELLED | OUTPATIENT
Start: 2020-07-24

## 2020-07-22 RX ADMIN — IRON SUCROSE 200 MG: 20 INJECTION, SOLUTION INTRAVENOUS at 14:47

## 2020-07-22 ASSESSMENT — PAIN SCALES - GENERAL: PAINLEVEL: MODERATE PAIN (5)

## 2020-07-24 ENCOUNTER — INFUSION THERAPY VISIT (OUTPATIENT)
Dept: INFUSION THERAPY | Facility: CLINIC | Age: 50
End: 2020-07-24
Attending: OBSTETRICS & GYNECOLOGY
Payer: COMMERCIAL

## 2020-07-24 VITALS
TEMPERATURE: 98.3 F | HEART RATE: 70 BPM | OXYGEN SATURATION: 98 % | RESPIRATION RATE: 18 BRPM | DIASTOLIC BLOOD PRESSURE: 78 MMHG | SYSTOLIC BLOOD PRESSURE: 117 MMHG

## 2020-07-24 DIAGNOSIS — D50.0 IRON DEFICIENCY ANEMIA DUE TO CHRONIC BLOOD LOSS: Primary | ICD-10-CM

## 2020-07-24 PROCEDURE — 25800030 ZZH RX IP 258 OP 636: Performed by: OBSTETRICS & GYNECOLOGY

## 2020-07-24 PROCEDURE — 25000128 H RX IP 250 OP 636: Performed by: OBSTETRICS & GYNECOLOGY

## 2020-07-24 PROCEDURE — 96365 THER/PROPH/DIAG IV INF INIT: CPT

## 2020-07-24 RX ORDER — HEPARIN SODIUM (PORCINE) LOCK FLUSH IV SOLN 100 UNIT/ML 100 UNIT/ML
5 SOLUTION INTRAVENOUS
Status: CANCELLED | OUTPATIENT
Start: 2020-07-26

## 2020-07-24 RX ORDER — HEPARIN SODIUM,PORCINE 10 UNIT/ML
5 VIAL (ML) INTRAVENOUS
Status: CANCELLED | OUTPATIENT
Start: 2020-07-26

## 2020-07-24 RX ADMIN — IRON SUCROSE 200 MG: 20 INJECTION, SOLUTION INTRAVENOUS at 12:30

## 2020-07-24 ASSESSMENT — PAIN SCALES - GENERAL: PAINLEVEL: NO PAIN (0)

## 2020-07-24 NOTE — PROGRESS NOTES
Infusion Nursing Note:  Keerthi Sloan presents today for venofer.    Patient seen by provider today: No   present during visit today: Not Applicable.    Note: N/A.    Intravenous Access:  Peripheral IV placed.    Treatment Conditions:  Not Applicable.      Post Infusion Assessment:  Patient tolerated infusion without incident.  Access discontinued per protocol.       Discharge Plan:   Patient discharged in stable condition accompanied by: self.  Departure Mode: Ambulatory.    Paula Wei RN

## 2020-07-27 ENCOUNTER — HOSPITAL ENCOUNTER (INPATIENT)
Facility: CLINIC | Age: 50
Setting detail: SURGERY ADMIT
End: 2020-07-27
Attending: OBSTETRICS & GYNECOLOGY | Admitting: OBSTETRICS & GYNECOLOGY
Payer: COMMERCIAL

## 2020-07-27 ENCOUNTER — TELEPHONE (OUTPATIENT)
Dept: OBGYN | Facility: CLINIC | Age: 50
End: 2020-07-27

## 2020-07-27 ENCOUNTER — INFUSION THERAPY VISIT (OUTPATIENT)
Dept: INFUSION THERAPY | Facility: CLINIC | Age: 50
End: 2020-07-27
Attending: OBSTETRICS & GYNECOLOGY
Payer: COMMERCIAL

## 2020-07-27 VITALS
TEMPERATURE: 98.5 F | RESPIRATION RATE: 18 BRPM | SYSTOLIC BLOOD PRESSURE: 142 MMHG | DIASTOLIC BLOOD PRESSURE: 85 MMHG | HEART RATE: 69 BPM

## 2020-07-27 DIAGNOSIS — D50.0 IRON DEFICIENCY ANEMIA DUE TO CHRONIC BLOOD LOSS: Primary | ICD-10-CM

## 2020-07-27 DIAGNOSIS — Z11.59 ENCOUNTER FOR SCREENING FOR OTHER VIRAL DISEASES: Primary | ICD-10-CM

## 2020-07-27 DIAGNOSIS — D25.0 INTRAMURAL AND SUBMUCOUS LEIOMYOMA OF UTERUS: ICD-10-CM

## 2020-07-27 DIAGNOSIS — D25.1 INTRAMURAL AND SUBMUCOUS LEIOMYOMA OF UTERUS: ICD-10-CM

## 2020-07-27 PROCEDURE — 25800030 ZZH RX IP 258 OP 636: Performed by: OBSTETRICS & GYNECOLOGY

## 2020-07-27 PROCEDURE — 96365 THER/PROPH/DIAG IV INF INIT: CPT

## 2020-07-27 PROCEDURE — 25000128 H RX IP 250 OP 636: Performed by: OBSTETRICS & GYNECOLOGY

## 2020-07-27 RX ORDER — HEPARIN SODIUM,PORCINE 10 UNIT/ML
5 VIAL (ML) INTRAVENOUS
Status: CANCELLED | OUTPATIENT
Start: 2020-07-28

## 2020-07-27 RX ORDER — HEPARIN SODIUM (PORCINE) LOCK FLUSH IV SOLN 100 UNIT/ML 100 UNIT/ML
5 SOLUTION INTRAVENOUS
Status: CANCELLED | OUTPATIENT
Start: 2020-07-28

## 2020-07-27 RX ADMIN — IRON SUCROSE 200 MG: 20 INJECTION, SOLUTION INTRAVENOUS at 14:19

## 2020-07-27 ASSESSMENT — PAIN SCALES - GENERAL: PAINLEVEL: NO PAIN (0)

## 2020-07-27 NOTE — TELEPHONE ENCOUNTER
Type of surgery: gyn  Location of surgery: [facility] OR  Date and time of surgery: 9/18/20 1030a  Surgeon: Elin  Pre-Op Appt Date: PCP, patient will call to schedule  Post-Op Appt Date: 10/28/20   Packet sent out: Yes  Pre-cert/Authorization completed:  No  Date: 07/27/20  Precious Paiz, Surgery Coordinator

## 2020-07-27 NOTE — PROGRESS NOTES
Infusion Nursing Note:  Keerthi Sloan presents today for venofer.    Patient seen by provider today: No   present during visit today: Not Applicable.    Note: N/A.    Intravenous Access:  Peripheral IV placed.    Treatment Conditions:  Not Applicable.      Post Infusion Assessment:  Patient tolerated infusion without incident.  Blood return noted pre and post infusion.  Site patent and intact, free from redness, edema or discomfort.  No evidence of extravasations.  Access discontinued per protocol.       Discharge Plan:   Discharge instructions reviewed with: Patient.  Patient and/or family verbalized understanding of discharge instructions and all questions answered.  Patient discharged in stable condition accompanied by: self.  Departure Mode: Ambulatory.    Kaylee Rojas RN

## 2020-07-27 NOTE — PROGRESS NOTES
"Infusion Nursing Note:  Keerthi Sloan presents today for ***.    Patient seen by provider today: {YES (EXPLAIN)/NO:154897}   present during visit today: {UMHLANGUAGE:354976}    Note: {Not Applicable or free text:534333:s:\"N/A\"}.    Intravenous Access:  {UMHIVACCESS:868968}    Treatment Conditions:  {UMHTXCONDITIONS:566139}      Post Infusion Assessment:  {UMHPOSTINFUSION:426692}       Discharge Plan:   {UMHDISCHARGE:986818}    Kaylee Rojas RN                        "

## 2020-07-29 ENCOUNTER — INFUSION THERAPY VISIT (OUTPATIENT)
Dept: INFUSION THERAPY | Facility: CLINIC | Age: 50
End: 2020-07-29
Attending: OBSTETRICS & GYNECOLOGY
Payer: COMMERCIAL

## 2020-07-29 VITALS
HEART RATE: 76 BPM | DIASTOLIC BLOOD PRESSURE: 76 MMHG | SYSTOLIC BLOOD PRESSURE: 128 MMHG | TEMPERATURE: 98.4 F | RESPIRATION RATE: 20 BRPM

## 2020-07-29 DIAGNOSIS — D50.0 IRON DEFICIENCY ANEMIA DUE TO CHRONIC BLOOD LOSS: Primary | ICD-10-CM

## 2020-07-29 PROCEDURE — 25000128 H RX IP 250 OP 636: Performed by: OBSTETRICS & GYNECOLOGY

## 2020-07-29 PROCEDURE — 25800030 ZZH RX IP 258 OP 636: Performed by: OBSTETRICS & GYNECOLOGY

## 2020-07-29 PROCEDURE — 96365 THER/PROPH/DIAG IV INF INIT: CPT

## 2020-07-29 RX ORDER — HEPARIN SODIUM (PORCINE) LOCK FLUSH IV SOLN 100 UNIT/ML 100 UNIT/ML
5 SOLUTION INTRAVENOUS
Status: CANCELLED | OUTPATIENT
Start: 2020-07-29

## 2020-07-29 RX ORDER — HEPARIN SODIUM,PORCINE 10 UNIT/ML
5 VIAL (ML) INTRAVENOUS
Status: CANCELLED | OUTPATIENT
Start: 2020-07-29

## 2020-07-29 RX ADMIN — SODIUM CHLORIDE 250 ML: 9 INJECTION, SOLUTION INTRAVENOUS at 13:05

## 2020-07-29 RX ADMIN — IRON SUCROSE 200 MG: 20 INJECTION, SOLUTION INTRAVENOUS at 13:05

## 2020-07-29 ASSESSMENT — PAIN SCALES - GENERAL: PAINLEVEL: NO PAIN (0)

## 2020-07-29 NOTE — PROGRESS NOTES
Infusion Nursing Note:  Keerthi Sloan presents today for venofer.    Patient seen by provider today: No   present during visit today: Not Applicable.    Note: N/A.    Intravenous Access:  Peripheral IV placed.    Treatment Conditions:  Not Applicable.      Post Infusion Assessment:  Patient tolerated infusion without incident.  Site patent and intact, free from redness, edema or discomfort.  No evidence of extravasations.  Access discontinued per protocol.       Discharge Plan:   AVS to patient via MYCHART.  Patient will return prn for next appointment.   Patient discharged in stable condition accompanied by: self.  Departure Mode: Ambulatory.    Geraldo Gan RN

## 2020-08-03 ENCOUNTER — TELEPHONE (OUTPATIENT)
Dept: OBGYN | Facility: CLINIC | Age: 50
End: 2020-08-03

## 2020-08-03 NOTE — TELEPHONE ENCOUNTER
Patient states she just finished her 5th round of IV iron infusions. She has her surgery scheduled on 9/18. She is wondering if you would recommend she come in for a hgb check to see what she is now, and then again with her pre-op labs. Please advise. Willow Gabriel RN

## 2020-08-04 DIAGNOSIS — D50.0 IRON DEFICIENCY ANEMIA DUE TO CHRONIC BLOOD LOSS: Primary | ICD-10-CM

## 2020-08-04 NOTE — TELEPHONE ENCOUNTER
Patient notified. Will contact [facility] in [place] which is closer to her house to schedule lab draw in a week or so. Willow Gabriel RN

## 2020-08-04 NOTE — PROGRESS NOTES
They take at least two weeks to fully work, so I would have her wait one more week then have it drawn, unless she's worried it's even lower than before.

## 2020-08-04 NOTE — TELEPHONE ENCOUNTER
Sent separate encounter with CBC ordered.   Recommend doing CBC in one week as it takes at least two weeks for them to work.

## 2020-08-18 RX ORDER — PHENAZOPYRIDINE HYDROCHLORIDE 200 MG/1
200 TABLET, FILM COATED ORAL ONCE
Status: CANCELLED | OUTPATIENT
Start: 2020-09-18 | End: 2020-09-18

## 2020-08-18 RX ORDER — CEFAZOLIN SODIUM IN 0.9 % NACL 3 G/100 ML
3 INTRAVENOUS SOLUTION, PIGGYBACK (ML) INTRAVENOUS
Status: CANCELLED | OUTPATIENT
Start: 2020-09-18

## 2020-08-18 RX ORDER — CEFAZOLIN SODIUM 1 G/3ML
1 INJECTION, POWDER, FOR SOLUTION INTRAMUSCULAR; INTRAVENOUS SEE ADMIN INSTRUCTIONS
Status: CANCELLED | OUTPATIENT
Start: 2020-09-18

## 2020-08-19 DIAGNOSIS — D50.0 IRON DEFICIENCY ANEMIA DUE TO CHRONIC BLOOD LOSS: ICD-10-CM

## 2020-08-19 PROCEDURE — 85027 COMPLETE CBC AUTOMATED: CPT | Performed by: OBSTETRICS & GYNECOLOGY

## 2020-08-19 PROCEDURE — 36415 COLL VENOUS BLD VENIPUNCTURE: CPT | Performed by: OBSTETRICS & GYNECOLOGY

## 2020-08-20 LAB
ERYTHROCYTE [DISTWIDTH] IN BLOOD BY AUTOMATED COUNT: 24.8 % (ref 10–15)
HCT VFR BLD AUTO: 40.6 % (ref 35–47)
HGB BLD-MCNC: 11.8 G/DL (ref 11.7–15.7)
MCH RBC QN AUTO: 20.3 PG (ref 26.5–33)
MCHC RBC AUTO-ENTMCNC: 29.1 G/DL (ref 31.5–36.5)
MCV RBC AUTO: 70 FL (ref 78–100)
PLATELET # BLD AUTO: 297 10E9/L (ref 150–450)
RBC # BLD AUTO: 5.81 10E12/L (ref 3.8–5.2)
WBC # BLD AUTO: 6 10E9/L (ref 4–11)

## 2020-08-28 ENCOUNTER — OFFICE VISIT (OUTPATIENT)
Dept: OBGYN | Facility: CLINIC | Age: 50
End: 2020-08-28
Payer: COMMERCIAL

## 2020-08-28 VITALS
BODY MASS INDEX: 45.99 KG/M2 | DIASTOLIC BLOOD PRESSURE: 86 MMHG | WEIGHT: 293 LBS | SYSTOLIC BLOOD PRESSURE: 142 MMHG | HEART RATE: 92 BPM | HEIGHT: 67 IN

## 2020-08-28 DIAGNOSIS — D25.1 INTRAMURAL, SUBMUCOUS, AND SUBSEROUS LEIOMYOMA OF UTERUS: ICD-10-CM

## 2020-08-28 DIAGNOSIS — N93.9 ABNORMAL UTERINE BLEEDING (AUB): Primary | ICD-10-CM

## 2020-08-28 DIAGNOSIS — D25.0 INTRAMURAL, SUBMUCOUS, AND SUBSEROUS LEIOMYOMA OF UTERUS: ICD-10-CM

## 2020-08-28 DIAGNOSIS — D25.2 INTRAMURAL, SUBMUCOUS, AND SUBSEROUS LEIOMYOMA OF UTERUS: ICD-10-CM

## 2020-08-28 PROCEDURE — 99214 OFFICE O/P EST MOD 30 MIN: CPT | Performed by: OBSTETRICS & GYNECOLOGY

## 2020-08-28 ASSESSMENT — MIFFLIN-ST. JEOR: SCORE: 2100.53

## 2020-08-28 NOTE — PROGRESS NOTES
SUBJECTIVE:                                                   Keerthi Sloan is a 50 year old female  who presents to clinic today for second opinion regarding a planned surgery.    She has a history of uterine fibroids, including one large central fibroid, as well as heavy periods resulting in anemia. She has received iron transfusions, has had multiple trips to the ED, and is ready for definitive treatment. She was scheduled for total abdominal hysterectomy, bilateral salpingectomy at [facility], but lives in [place] and is interested in pursuing treatment closer to home. She was referred by her primary to our clinic.    Prior endometrial biopsy was reviewed and is negative.     Prior US:    ULTRASOUND PELVIC COMPLETE WITH TRANSVAGINAL IMAGING  2020 1:56  PM     CLINICAL HISTORY: Menorrhagia and dysmenorrhea, history of fibroids.   Uterine leiomyoma, unspecified location.     TECHNIQUE: Transabdominal scans were performed. Endovaginal ultrasound  was performed to better visualize the adnexa.     COMPARISON: None.     FINDINGS:     UTERUS: 16.6 x 13.7 x 7.9 cm. Uterus is enlarged. There is a large  heterogeneous submucosal mass that occupies much of the central uterus  and measures 8.0 x 9.1 x 4.4 cm, compatible with a large fibroid.  There is a second intramural fibroid in the lower uterine segment that  measures 4.7 x 3.9 x 4.3 cm.     ENDOMETRIUM: 27 mm. The endometrium is thickened and hyperechoic.     RIGHT OVARY: Not visualized due to intestinal gas.     LEFT OVARY: Not visualized due to intestinal gas.     No significant free fluid.                                                                      IMPRESSION:  1.  Enlarged fibroid uterus.  2.  Abnormal thickened and echogenic endometrium. This may be related  to secretory phase and relative obstruction by fibroid. Endometrial  neoplasia cannot be excluded. Consider endometrial biopsy and/or  pelvic MRI with contrast.        Problem list  and histories reviewed & adjusted, as indicated.  Additional history: as documented.    Patient Active Problem List   Diagnosis     Grief     Binge eating     Dysmenorrhea     Morbid obesity (H)     History of biliary T-tube placement     Prediabetes     Vitamin D deficiency     Anemia, iron deficiency     Obstructive sleep apnea syndrome     Stress incontinence in female     Uterine leiomyoma     BMI 40.0-44.9, adult (H)     PTSD (post-traumatic stress disorder)     Adjustment disorder with mixed anxiety and depressed mood     Adjustment insomnia     Encounter related to worker's compensation claim     Iron deficiency anemia due to chronic blood loss     Intramural and submucous leiomyoma of uterus     Past Surgical History:   Procedure Laterality Date     GYN SURGERY       HERNIA REPAIR      inguinal     TUBAL LIGATION  1997    LBTL      Social History     Tobacco Use     Smoking status: Never Smoker     Smokeless tobacco: Never Used   Substance Use Topics     Alcohol use: No     Alcohol/week: 0.0 standard drinks      Problem (# of Occurrences) Relation (Name,Age of Onset)    Diabetes (2) Father, Brother    Hypertension (2) Father, Brother    Kidney Disease (1) Father            cholecalciferol (VITAMIN D3) 11144 units capsule, Take 1 capsule (50,000 Units) by mouth once a week  Ferrous Sulfate 27 MG TABS, 1 tab QD  HYDROcodone-acetaminophen (NORCO) 5-325 MG tablet, Take 1 tablet by mouth every 4 hours as needed for severe pain  metFORMIN (GLUCOPHAGE) 500 MG tablet, Take 1 tablet (500 mg) by mouth 2 times daily (with meals)  methocarbamol (ROBAXIN) 500 MG tablet, 1-2 tabs q TID PRN for muscle spasm/pain  omeprazole (PRILOSEC) 40 MG capsule,   sertraline (ZOLOFT) 100 MG tablet, Take 1.5 tablets (150 mg) by mouth daily  SUMAtriptan (IMITREX) 25 MG tablet, Take 1 tablet (25 mg) by mouth at onset of headache for migraine May repeat in 2 hours. Max 8 tablets/24 hours.  traZODone (DESYREL) 150 MG tablet, Take 1 tablet  (150 mg) by mouth nightly as needed for sleep  vitamin D2 (ERGOCALCIFEROL) 29141 units (1250 mcg) capsule, Take 1 capsule (50,000 Units) by mouth once a week  oxybutynin (DITROPAN-XL) 10 MG 24 hr tablet, Take 10 mg by mouth  phentermine (ADIPEX-P) 37.5 MG tablet, Take 1 tablet (37.5 mg) by mouth every morning (before breakfast) (Patient not taking: Reported on 8/28/2020)  topiramate (TOPAMAX) 25 MG tablet, 1-2 tabs bid (Patient not taking: Reported on 8/28/2020)    No current facility-administered medications on file prior to visit.     No Known Allergies    ROS:  General: POSITIVE for:, fatigue  Resp: No coughing, wheezing or shortness of breath  CV: No chest pains or palpitations  GI: No nausea, vomiting,  heartburn, abdominal pain, diarrhea, constipation or change in bowel habits  : No urinary frequency or dysuria, bladder or kidney problems  Neurologic: No headaches, numbness, tingling, weakness, problems with balance or coordination  Psychiatric: No problems with anxiety, depression or mental health  Heme/immune/allergy: none  Endocrine: No history of thyroid disease, diabetes or other endocrine disorders  Skin: No rashes,worrisome lesions or skin problems      OBJECTIVE:     Exam:  Constitutional:  Appearance: Well nourished, well developed alert, in no acute distress  Neurologic/Psychiatric:  Mental Status:  Oriented X3   Abdomen: soft, nontender. Uterus is at the umbilicus. Discussed pfannensteil incision.      In-Clinic Test Results:  No results found for this or any previous visit (from the past 24 hour(s)).    ASSESSMENT/PLAN:                                                        ICD-10-CM    1. Abnormal uterine bleeding (AUB)  N93.9    2. Intramural, submucous, and subserous leiomyoma of uterus  D25.1     D25.0     D25.2          She is interested in definitive treatment with hysterectomy. Discussed total abdominal hysterectomy, bilateral salpingectomy. She would like to proceed ASAP. We discussed the  risks of surgery including, but not limited to, risks of anesthesia, risk of bleeding with possibility of transfusion, infection, injury to abdominal/pelvic organs, blood vessels, or nerves and possible need for second surgery for unrecognized injury. She stated her understanding and was encouraged to contact me if she has any other questions prior to the day of her scheduled procedure.    She is electing to proceed with total abdominal hysterectomy, bilateral salpingectomy, and will be contacted by the  to arrange. She knows she will need H&P with her primary care provider.     Lidya Jain MD  [facility]

## 2020-08-28 NOTE — NURSING NOTE
"Chief Complaint   Patient presents with     Consult     Fibroids, heavy bleeding and pain x 1-2yrs. Hx of ablation in 2014. Hx of anemia.        Initial BP (!) 142/86   Pulse 92   Ht 1.702 m (5' 7\")   Wt 143.8 kg (317 lb)   Breastfeeding No   BMI 49.65 kg/m   Estimated body mass index is 49.65 kg/m  as calculated from the following:    Height as of this encounter: 1.702 m (5' 7\").    Weight as of this encounter: 143.8 kg (317 lb).  BP completed using cuff size: large    Questioned patient about current smoking habits.  Pt. has never smoked.          The following HM Due: NONE      The following patient reported/Care Every where data was sent to:  P ABSTRACT QUALITY INITIATIVES [[id number]]  Nirmala Desai LPN             "

## 2020-08-31 ENCOUNTER — TELEPHONE (OUTPATIENT)
Dept: OBGYN | Facility: CLINIC | Age: 50
End: 2020-08-31

## 2020-08-31 ENCOUNTER — PREP FOR PROCEDURE (OUTPATIENT)
Dept: OBGYN | Facility: CLINIC | Age: 50
End: 2020-08-31

## 2020-08-31 DIAGNOSIS — D25.9 FIBROID UTERUS: Primary | ICD-10-CM

## 2020-08-31 DIAGNOSIS — N92.0 MENORRHAGIA: ICD-10-CM

## 2020-08-31 NOTE — TELEPHONE ENCOUNTER
Surgeon:LAURA JAIN   Assist:  Yes another MD   Location: [facility]   Date/time preference:  per patient (soon!)     Surgery:  total abdominal hysterectomy, bilateral salpingectomy   Length of Surgery:  2 hours   Diagnosis:  fibroids, menorrhagia   Anesthesia type:  GENERAL     Special instructions / equipment:  Ligasure   Am admit or same day: AM ADMIT   Bowel prep: Yes   Pre op: PCP   Office visit with surgeon prior to surgery: No   Schedule postop visit: 2 and 6 weeks     Thanks,   Laura Jain MD

## 2020-09-01 DIAGNOSIS — Z11.59 ENCOUNTER FOR SCREENING FOR OTHER VIRAL DISEASES: Primary | ICD-10-CM

## 2020-09-01 PROBLEM — D25.9 FIBROID UTERUS: Status: ACTIVE | Noted: 2020-09-01

## 2020-09-01 PROBLEM — N92.0 MENORRHAGIA: Status: ACTIVE | Noted: 2020-09-01

## 2020-09-01 NOTE — TELEPHONE ENCOUNTER
Type of surgery: total abdominal hysterectomy, bilateral salpingectomy  Location of surgery: [facility]  Date and time of surgery: 9/23/20 @ 7:30 am  Surgeon: Dr. Jain  Pre-Op Appt Date: Patient advised to schedule.  Post-Op Appt Date: 10/8/20   Packet sent out: Yes  Pre-cert/Authorization completed:  No  Date: 9/1/20

## 2020-09-10 ENCOUNTER — TELEPHONE (OUTPATIENT)
Dept: OBGYN | Facility: CLINIC | Age: 50
End: 2020-09-10

## 2020-09-11 ENCOUNTER — OFFICE VISIT (OUTPATIENT)
Dept: FAMILY MEDICINE | Facility: CLINIC | Age: 50
End: 2020-09-11
Payer: COMMERCIAL

## 2020-09-11 VITALS
OXYGEN SATURATION: 98 % | DIASTOLIC BLOOD PRESSURE: 82 MMHG | WEIGHT: 293 LBS | BODY MASS INDEX: 45.99 KG/M2 | HEIGHT: 67 IN | HEART RATE: 63 BPM | SYSTOLIC BLOOD PRESSURE: 136 MMHG | RESPIRATION RATE: 16 BRPM

## 2020-09-11 DIAGNOSIS — N92.0 MENORRHAGIA WITH REGULAR CYCLE: ICD-10-CM

## 2020-09-11 DIAGNOSIS — D25.0 INTRAMURAL, SUBMUCOUS, AND SUBSEROUS LEIOMYOMA OF UTERUS: ICD-10-CM

## 2020-09-11 DIAGNOSIS — D25.1 INTRAMURAL, SUBMUCOUS, AND SUBSEROUS LEIOMYOMA OF UTERUS: ICD-10-CM

## 2020-09-11 DIAGNOSIS — D50.0 IRON DEFICIENCY ANEMIA DUE TO CHRONIC BLOOD LOSS: ICD-10-CM

## 2020-09-11 DIAGNOSIS — Z01.818 PRE-OP EXAM: Primary | ICD-10-CM

## 2020-09-11 DIAGNOSIS — G47.33 OBSTRUCTIVE SLEEP APNEA SYNDROME: ICD-10-CM

## 2020-09-11 DIAGNOSIS — D25.2 INTRAMURAL, SUBMUCOUS, AND SUBSEROUS LEIOMYOMA OF UTERUS: ICD-10-CM

## 2020-09-11 LAB
ERYTHROCYTE [DISTWIDTH] IN BLOOD BY AUTOMATED COUNT: 22.7 % (ref 10–15)
FERRITIN SERPL-MCNC: 10 NG/ML (ref 8–252)
HCT VFR BLD AUTO: 35.7 % (ref 35–47)
HGB BLD-MCNC: 10.5 G/DL (ref 11.7–15.7)
MCH RBC QN AUTO: 20.7 PG (ref 26.5–33)
MCHC RBC AUTO-ENTMCNC: 29.4 G/DL (ref 31.5–36.5)
MCV RBC AUTO: 70 FL (ref 78–100)
PLATELET # BLD AUTO: 280 10E9/L (ref 150–450)
RBC # BLD AUTO: 5.07 10E12/L (ref 3.8–5.2)
WBC # BLD AUTO: 5.4 10E9/L (ref 4–11)

## 2020-09-11 PROCEDURE — 85027 COMPLETE CBC AUTOMATED: CPT | Performed by: NURSE PRACTITIONER

## 2020-09-11 PROCEDURE — 99214 OFFICE O/P EST MOD 30 MIN: CPT | Performed by: NURSE PRACTITIONER

## 2020-09-11 PROCEDURE — 36415 COLL VENOUS BLD VENIPUNCTURE: CPT | Performed by: NURSE PRACTITIONER

## 2020-09-11 PROCEDURE — 82728 ASSAY OF FERRITIN: CPT | Performed by: NURSE PRACTITIONER

## 2020-09-11 ASSESSMENT — MIFFLIN-ST. JEOR: SCORE: 2107.79

## 2020-09-11 NOTE — PROGRESS NOTES
[address] [address]  Phone: [phone]  Primary Provider: Elida Cameron  Pre-op Performing Provider: ELIE GROVER    PREOPERATIVE EVALUATION:  Today's date: 9/11/2020    Keerthi Sloan is a 50 year old female who presents for a preoperative evaluation.    Surgical Information:  Surgery Details 9/11/2020   Surgery/Procedure: hysterectomy   Surgery Location: [facility]   Surgeon:    Surgery Date: 09/23/20   Time of Surgery: 7:00am   Where patient plans to recover: At home with family     Fax number for surgical facility: Note does not need to be faxed, will be available electronically in Epic.  Type of Anesthesia Anticipated: General    Subjective     HPI related to upcoming procedure: patient has been having very heavy menstrual bleeding and pain due to fibroids causing iron deficiency.  She is scheduled for a hysterectomy.  Preop Questions 9/11/2020   1. Have you ever had a heart attack or stroke? No   2. Have you ever had surgery on your heart or blood vessels, such as a stent placement, a coronary artery bypass, or surgery on an artery in your head, neck, heart, or legs? No   3. Do you have chest pain with activity? No   4. Do you have a history of  heart failure? No   5. Do you currently have a cold, bronchitis or symptoms of other infection? No   6. Do you have a cough, shortness of breath, or wheezing? No   7. Do you or anyone in your family have previous history of blood clots? No   8. Do you or does anyone in your family have a serious bleeding problem such as prolonged bleeding following surgeries or cuts? No   9. Have you ever had problems with anemia or been told to take iron pills? YES - 27mg ferrous sulfate    10. Have you had any abnormal blood loss such as black, tarry or bloody stools, or abnormal vaginal bleeding? YES - reason for surgery     Patient does not have a Health Care Directive or Living  Will: Discussed advance care planning with patient; however, patient declined at this time.    }  SLEEP PROBLEM - Patient has a longstanding history of obstructive sleep apnea treated with CPAP.      Review of Systems  Constitutional, neuro, ENT, endocrine, pulmonary, cardiac, gastrointestinal, genitourinary, musculoskeletal, integument and psychiatric systems are negative, except as otherwise noted.    Patient Active Problem List    Diagnosis Date Noted     Fibroid uterus 09/01/2020     Priority: Medium     Added automatically from request for surgery [id number]       Menorrhagia 09/01/2020     Priority: Medium     Added automatically from request for surgery [id number]       Intramural and submucous leiomyoma of uterus 07/27/2020     Priority: Medium     Added automatically from request for surgery [id number]       Iron deficiency anemia due to chronic blood loss 07/13/2020     Priority: Medium     Adjustment insomnia 11/27/2018     Priority: Medium     PTSD (post-traumatic stress disorder) 04/06/2018     Priority: Medium     Adjustment disorder with mixed anxiety and depressed mood 04/06/2018     Priority: Medium     BMI 40.0-44.9, adult (H) 12/06/2017     Priority: Medium     Anemia, iron deficiency 01/23/2017     Priority: Medium     Prediabetes 06/01/2016     Priority: Medium     Vitamin D deficiency 06/01/2016     Priority: Medium     Overview:   2/25/2014       Uterine leiomyoma 07/10/2014     Priority: Medium     History of biliary T-tube placement 06/06/2014     Priority: Medium     Obstructive sleep apnea syndrome 03/26/2014     Priority: Medium     Overview:   3/26/2014, severe       Grief 03/17/2014     Priority: Medium     Binge eating 03/17/2014     Priority: Medium     Stress incontinence in female 02/25/2014     Priority: Medium     Morbid obesity (H) 07/14/2012     Priority: Medium     Overview:   2/25/2014, BMI=44  Last Assessment & Plan:   Patient is interested in losing weight.  She is inquiring about  gastric bypass surgery.  We have talked briefly today about options which is actually [facility].  She has never been talked about nutrition.  Her family was raised been told that we need to clean her platelets.  Portion sizes were often huge.  She is very motivated at this time to lose weight.       Dysmenorrhea 04/20/2011     Priority: Medium     Last Assessment & Plan:   She is working with ob gyne.        Past Medical History:   Diagnosis Date     Anemia      Fibroids      Obesity      Prediabetes      Sleep apnea      Past Surgical History:   Procedure Laterality Date     HERNIA REPAIR      inguinal     KNEE SURGERY Left     left knee partial meniscus repair     TUBAL LIGATION  1997    LBTL     Current Outpatient Medications   Medication Sig Dispense Refill     cholecalciferol (VITAMIN D3) 05255 units capsule Take 1 capsule (50,000 Units) by mouth once a week 12 capsule 1     Ferrous Sulfate 27 MG TABS 1 tab QD       HYDROcodone-acetaminophen (NORCO) 5-325 MG tablet Take 1 tablet by mouth every 4 hours as needed for severe pain       methocarbamol (ROBAXIN) 500 MG tablet 1-2 tabs q TID PRN for muscle spasm/pain 24 tablet 0     omeprazole (PRILOSEC) 40 MG capsule        SUMAtriptan (IMITREX) 25 MG tablet Take 1 tablet (25 mg) by mouth at onset of headache for migraine May repeat in 2 hours. Max 8 tablets/24 hours. 18 tablet 3     vitamin D2 (ERGOCALCIFEROL) 71793 units (1250 mcg) capsule Take 1 capsule (50,000 Units) by mouth once a week 12 capsule 1     metFORMIN (GLUCOPHAGE) 500 MG tablet Take 1 tablet (500 mg) by mouth 2 times daily (with meals) (Patient not taking: Reported on 9/11/2020) 180 tablet 1     oxybutynin (DITROPAN-XL) 10 MG 24 hr tablet Take 10 mg by mouth       phentermine (ADIPEX-P) 37.5 MG tablet Take 1 tablet (37.5 mg) by mouth every morning (before breakfast) (Patient not taking: Reported on 9/11/2020) 32 tablet 3     sertraline (ZOLOFT) 100 MG tablet Take 1.5  "tablets (150 mg) by mouth daily (Patient not taking: Reported on 9/11/2020) 135 tablet 1     topiramate (TOPAMAX) 25 MG tablet 1-2 tabs bid (Patient not taking: Reported on 9/11/2020) 360 tablet 1     traZODone (DESYREL) 150 MG tablet Take 1 tablet (150 mg) by mouth nightly as needed for sleep (Patient not taking: Reported on 9/11/2020) 90 tablet 1       No Known Allergies     Social History     Tobacco Use     Smoking status: Never Smoker     Smokeless tobacco: Never Used   Substance Use Topics     Alcohol use: No     Alcohol/week: 0.0 standard drinks     Family History   Problem Relation Age of Onset     Diabetes Father      Hypertension Father      Kidney Disease Father      Chronic Obstructive Pulmonary Disease Father      Heart Failure Father      Diabetes Brother      Hypertension Brother      History   Drug Use No            Objective   /82 (BP Location: Right arm)   Pulse 63   Resp 16   Ht 1.702 m (5' 7\")   Wt 144.5 kg (318 lb 9.6 oz)   SpO2 98%   BMI 49.90 kg/m    Physical Exam    GENERAL APPEARANCE: healthy, alert, active, no distress, cooperative and morbidly obese     EYES: EOMI, PERRL     HENT: ear canals and TM's normal and nose and mouth without ulcers or lesions     NECK: no adenopathy, no asymmetry, masses, or scars and thyroid normal to palpation     RESP: lungs clear to auscultation - no rales, rhonchi or wheezes     CV: regular rates and rhythm, normal S1 S2, no S3 or S4 and no murmur, click or rub     ABDOMEN: soft, tenderness lower abdomen     SKIN: no suspicious lesions or rashes     NEURO: Normal strength and tone, sensory exam grossly normal, mentation intact and speech normal     PSYCH: mentation appears normal. and affect normal/bright     LYMPHATICS: No cervical adenopathy    Recent Labs   Lab Test 08/19/20  1433 07/11/20  1456 06/29/20  0847   HGB 11.8 9.3* 8.9*    166 357   NA  --  138 135   POTASSIUM  --  3.7 3.6   CR  --  0.64 0.57   A1C  --   --  5.9*        PRE-OP " Diagnostics:  Recent Results (from the past 48 hour(s))   CBC with platelets    Collection Time: 09/11/20  2:34 PM   Result Value Ref Range    WBC 5.4 4.0 - 11.0 10e9/L    RBC Count 5.07 3.8 - 5.2 10e12/L    Hemoglobin 10.5 (L) 11.7 - 15.7 g/dL    Hematocrit 35.7 35.0 - 47.0 %    MCV 70 (L) 78 - 100 fl    MCH 20.7 (L) 26.5 - 33.0 pg    MCHC 29.4 (L) 31.5 - 36.5 g/dL    RDW 22.7 (H) 10.0 - 15.0 %    Platelet Count 280 150 - 450 10e9/L   Ferritin    Collection Time: 09/11/20  2:34 PM   Result Value Ref Range    Ferritin 10 8 - 252 ng/mL     No EKG required, no history of coronary heart disease, significant arrhythmia, peripheral arterial disease or other structural heart disease.         Assessment & Plan   The proposed surgical procedure is considered INTERMEDIATE risk.    REVISED CARDIAC RISK INDEX  The patient has the following serious cardiovascular risks for perioperative complications:  No serious cardiac risks = 0 points    INTERPRETATION: 0 points: Class I (very low risk - 0.4% complication rate)       1. Pre-op exam    - CBC with platelets  - Ferritin    2. Intramural, submucous, and subserous leiomyoma of uterus    3. Menorrhagia with regular cycle    4. Iron deficiency anemia due to chronic blood loss    5. Obstructive sleep apnea syndrome      The patient has the following additional risks and recommendations for perioperative complications:     - Morbid obesity (BMI >40)    OBSTRUCTIVE SLEEP APNEA:   MARÍA Instructions:  - Patient is to bring their home CPAP with them on the day of surgery   - Hospital staff are advised to monitor for sleep related oxygen desaturations due to suspicion of MARÍA     MEDICATION INSTRUCTIONS:    DIABETIC Medications:   - METFORMIN: HOLD day of surgery.    RECOMMENDATION:  APPROVAL GIVEN to proceed with proposed procedure, without further diagnostic evaluation.    No follow-ups on file.    Signed Electronically by: Destiney Rojas NP    Copy of this evaluation report is  provided to requesting physician.    Preop [facility] Preop Guidelines    Revised Cardiac Risk Index

## 2020-09-11 NOTE — PATIENT INSTRUCTIONS
[facility]     Discharged by : Christina Watkins CMA  If you have any questions regarding your visit please contact your care team:     Team Julita              Clinic Hours Telephone Number     Dr. Anil Rojas, CNP   7am-7pm  Monday - Thursday   7am-5pm  Fridays  [phone]   (Appointment scheduling available 24/7)     RN Line  [phone] option 2     Urgent Care - [place] - 11am-9pm Monday-Friday Saturday-Sunday- 9am-5pm     [place] -   5pm-9pm Monday-Friday Saturday-Sunday- 9am-5pm    [phone] - [place]    [phone] - [place]     For a Price Quote for your services, please call our [employer] Price Line at [phone].     What options do I have for visits at the clinic other than the traditional office visit?     To expand how we care for you, many of our providers are utilizing electronic visits (e-visits) and telephone visits, when medically appropriate, for interactions with their patients rather than a visit in the clinic. We also offer nurse visits for many medical concerns. Just like any other service, we will bill your insurance company for this type of visit based on time spent on the phone with your provider. Not all insurance companies cover these visits. Please check with your medical insurance if this type of visit is covered. You will be responsible for any charges that are not paid by your insurance.     E-visits via [employer]: generally incur a $45.00 fee.     Telephone visits:  Time spent on the phone: *charged based on time that is spent on the phone in increments of 10 minutes. Estimated cost:   5-10 mins $30.00   11-20 mins. $59.00   21-30 mins. $85.00       Use [employer] (secure email communication and access to your chart) to send your primary care provider a message or make an appointment. Ask someone on your Team how to sign up for [employer].     As always, Thank you for trusting us  with your health care needs!      [facility]:    Scheduling Appointments  [facility]  Call: [phone]    [facility]  Call: [phone]    [facility]  Call: [phone]    For Gastroenterology referrals   [facility]   Clinics and Surgery Center, 4th Floor   [address] [place] [address]   Appointments: [phone]    WHERE TO GO FOR CARE?    Clinic    Make an appointment if you:       Are sick (cold, cough, flu, sore throat, earache or in pain).       Have a small injury (sprain, small cut, burn or broken bone).       Need a physical exam, Pap smear, vaccine or prescription refill.       Have questions about your health or medicines.    To reach us:      Call [phone] ([phone]). Open 24 hours every day. (For counseling services, call [phone].)    Log into [facility]. (Visit [email or web address] to create an account.) Hospital emergency room    An emergency is a serious or life- threatening problem that must be treated right away.    Call 171 or get to the hospital if you have:      Very bad or sudden:            - Chest pain or pressure         - Bleeding         - Head or belly pain         - Dizziness or trouble seeing, walking or                          Speaking      Problems breathing      Blood in your vomit or you are coughing up blood      A major injury (knocked out, loss of a finger or limb, rape, broken bone protruding from skin)    A mental health crisis. (Or call the Mental Health Crisis line at 1-818.465.9867 or Suicide Prevention Hotline at 1-678.223.2419.)    Open 24 hours every day. You don't need an appointment.     Urgent care    Visit urgent care for sickness or small injuries when the clinic is closed. You don't need an appointment. To check hours or find an urgent care near you, visit [email or web address]. Online care    Get online  care from [facility] for more than 70 common problems, like colds, allergies and infections. Open 24 hours every day at:   [email or web address]   Need help deciding?    For advice about where to be seen, you may call your clinic and ask to speak with a nurse. We're here for you 24 hours every day.         If you are deaf or hard of hearing, please let us know. We provide many free services including sign language interpreters, oral interpreters, TTYs, telephone amplifiers, note takers and written materials.

## 2020-09-12 PROBLEM — D50.0 IRON DEFICIENCY ANEMIA DUE TO CHRONIC BLOOD LOSS: Status: RESOLVED | Noted: 2020-07-13 | Resolved: 2020-09-12

## 2020-09-12 PROBLEM — Z02.6 ENCOUNTER RELATED TO WORKER'S COMPENSATION CLAIM: Status: RESOLVED | Noted: 2018-11-27 | Resolved: 2020-09-12

## 2020-09-21 DIAGNOSIS — Z11.59 ENCOUNTER FOR SCREENING FOR OTHER VIRAL DISEASES: ICD-10-CM

## 2020-09-21 LAB
SARS-COV-2 RNA SPEC QL NAA+PROBE: NOT DETECTED
SPECIMEN SOURCE: NORMAL

## 2020-09-21 PROCEDURE — U0003 INFECTIOUS AGENT DETECTION BY NUCLEIC ACID (DNA OR RNA); SEVERE ACUTE RESPIRATORY SYNDROME CORONAVIRUS 2 (SARS-COV-2) (CORONAVIRUS DISEASE [COVID-19]), AMPLIFIED PROBE TECHNIQUE, MAKING USE OF HIGH THROUGHPUT TECHNOLOGIES AS DESCRIBED BY CMS-2020-01-R: HCPCS | Performed by: OBSTETRICS & GYNECOLOGY

## 2020-09-22 NOTE — PHARMACY-ADMISSION MEDICATION HISTORY
Admission medication history interview status for this patient is complete. See [facility] admission navigator for allergy information, prior to admission medications and immunization status.     PTA meds completed by pre-admitting nurse, Rose Fried RN, and reviewed by pharmacy.      Prior to Admission medications    Medication Sig Last Dose Taking? Auth Provider   cholecalciferol (VITAMIN D3) 17450 units capsule Take 1 capsule (50,000 Units) by mouth once a week  Yes Janet Castaneda APRN CNP   Ferrous Sulfate 27 MG TABS Take 1 tablet by mouth daily   Yes Reported, Patient   metFORMIN (GLUCOPHAGE) 500 MG tablet Take 1 tablet (500 mg) by mouth 2 times daily (with meals)  Yes Janet Castaneda APRN CNP   methocarbamol (ROBAXIN) 500 MG tablet 1-2 tabs q TID PRN for muscle spasm/pain  Yes Alistair Deluna APRN CNP   omeprazole (PRILOSEC) 40 MG capsule Take 40 mg by mouth daily as needed   Yes Reported, Patient   phentermine (ADIPEX-P) 37.5 MG tablet Take 1 tablet (37.5 mg) by mouth every morning (before breakfast)  Yes Janet Castaneda APRN CNP   sertraline (ZOLOFT) 100 MG tablet Take 1.5 tablets (150 mg) by mouth daily  Yes Elida Cameron DO   SUMAtriptan (IMITREX) 25 MG tablet Take 1 tablet (25 mg) by mouth at onset of headache for migraine May repeat in 2 hours. Max 8 tablets/24 hours.  Yes Elida Cameron DO   topiramate (TOPAMAX) 25 MG tablet 1-2 tabs bid  Yes Janet Castaneda APRN CNP   traZODone (DESYREL) 150 MG tablet Take 1 tablet (150 mg) by mouth nightly as needed for sleep  Yes Elida Cameron DO

## 2020-09-23 ENCOUNTER — ANESTHESIA (OUTPATIENT)
Dept: SURGERY | Facility: CLINIC | Age: 50
End: 2020-09-23
Payer: COMMERCIAL

## 2020-09-23 ENCOUNTER — HOSPITAL ENCOUNTER (INPATIENT)
Facility: CLINIC | Age: 50
LOS: 2 days | Discharge: HOME | End: 2020-09-25
Attending: OBSTETRICS & GYNECOLOGY | Admitting: OBSTETRICS & GYNECOLOGY
Payer: COMMERCIAL

## 2020-09-23 ENCOUNTER — ANESTHESIA EVENT (OUTPATIENT)
Dept: SURGERY | Facility: CLINIC | Age: 50
End: 2020-09-23
Payer: COMMERCIAL

## 2020-09-23 DIAGNOSIS — N92.0 MENORRHAGIA: ICD-10-CM

## 2020-09-23 DIAGNOSIS — D25.9 FIBROID UTERUS: ICD-10-CM

## 2020-09-23 DIAGNOSIS — Z90.710 S/P TAH (TOTAL ABDOMINAL HYSTERECTOMY): Primary | ICD-10-CM

## 2020-09-23 LAB
GLUCOSE BLDC GLUCOMTR-MCNC: 103 MG/DL (ref 70–99)
GLUCOSE BLDC GLUCOMTR-MCNC: 132 MG/DL (ref 70–99)
HCG UR QL: NEGATIVE

## 2020-09-23 PROCEDURE — 25000128 H RX IP 250 OP 636: Performed by: ANESTHESIOLOGY

## 2020-09-23 PROCEDURE — 88307 TISSUE EXAM BY PATHOLOGIST: CPT | Performed by: OBSTETRICS & GYNECOLOGY

## 2020-09-23 PROCEDURE — 25800030 ZZH RX IP 258 OP 636: Performed by: OBSTETRICS & GYNECOLOGY

## 2020-09-23 PROCEDURE — 25000128 H RX IP 250 OP 636: Performed by: NURSE ANESTHETIST, CERTIFIED REGISTERED

## 2020-09-23 PROCEDURE — 81025 URINE PREGNANCY TEST: CPT | Performed by: OBSTETRICS & GYNECOLOGY

## 2020-09-23 PROCEDURE — 12000013 ZZH R&B PEDS

## 2020-09-23 PROCEDURE — 00000146 ZZHCL STATISTIC GLUCOSE BY METER IP

## 2020-09-23 PROCEDURE — 25000128 H RX IP 250 OP 636: Performed by: OBSTETRICS & GYNECOLOGY

## 2020-09-23 PROCEDURE — 0UT90ZZ RESECTION OF UTERUS, OPEN APPROACH: ICD-10-PCS | Performed by: OBSTETRICS & GYNECOLOGY

## 2020-09-23 PROCEDURE — 58150 TOTAL HYSTERECTOMY: CPT | Performed by: OBSTETRICS & GYNECOLOGY

## 2020-09-23 PROCEDURE — 0UT70ZZ RESECTION OF BILATERAL FALLOPIAN TUBES, OPEN APPROACH: ICD-10-PCS | Performed by: OBSTETRICS & GYNECOLOGY

## 2020-09-23 PROCEDURE — 71000012 ZZH RECOVERY PHASE 1 LEVEL 1 FIRST HR: Performed by: OBSTETRICS & GYNECOLOGY

## 2020-09-23 PROCEDURE — 71000013 ZZH RECOVERY PHASE 1 LEVEL 1 EA ADDTL HR: Performed by: OBSTETRICS & GYNECOLOGY

## 2020-09-23 PROCEDURE — 27210794 ZZH OR GENERAL SUPPLY STERILE: Performed by: OBSTETRICS & GYNECOLOGY

## 2020-09-23 PROCEDURE — 40000305 ZZH STATISTIC PRE PROC ASSESS I: Performed by: OBSTETRICS & GYNECOLOGY

## 2020-09-23 PROCEDURE — 25000125 ZZHC RX 250: Performed by: OBSTETRICS & GYNECOLOGY

## 2020-09-23 PROCEDURE — 88307 TISSUE EXAM BY PATHOLOGIST: CPT | Mod: 26 | Performed by: OBSTETRICS & GYNECOLOGY

## 2020-09-23 PROCEDURE — 58150 TOTAL HYSTERECTOMY: CPT | Mod: 80 | Performed by: OBSTETRICS & GYNECOLOGY

## 2020-09-23 PROCEDURE — 36000063 ZZH SURGERY LEVEL 4 EA 15 ADDTL MIN: Performed by: OBSTETRICS & GYNECOLOGY

## 2020-09-23 PROCEDURE — 25800030 ZZH RX IP 258 OP 636: Performed by: NURSE ANESTHETIST, CERTIFIED REGISTERED

## 2020-09-23 PROCEDURE — 25000125 ZZHC RX 250: Performed by: NURSE ANESTHETIST, CERTIFIED REGISTERED

## 2020-09-23 PROCEDURE — 25000132 ZZH RX MED GY IP 250 OP 250 PS 637: Performed by: OBSTETRICS & GYNECOLOGY

## 2020-09-23 PROCEDURE — 37000008 ZZH ANESTHESIA TECHNICAL FEE, 1ST 30 MIN: Performed by: OBSTETRICS & GYNECOLOGY

## 2020-09-23 PROCEDURE — 36000093 ZZH SURGERY LEVEL 4 1ST 30 MIN: Performed by: OBSTETRICS & GYNECOLOGY

## 2020-09-23 PROCEDURE — 25800030 ZZH RX IP 258 OP 636: Performed by: ANESTHESIOLOGY

## 2020-09-23 PROCEDURE — 93010 ELECTROCARDIOGRAM REPORT: CPT | Performed by: INTERNAL MEDICINE

## 2020-09-23 PROCEDURE — 37000009 ZZH ANESTHESIA TECHNICAL FEE, EACH ADDTL 15 MIN: Performed by: OBSTETRICS & GYNECOLOGY

## 2020-09-23 RX ORDER — IBUPROFEN 600 MG/1
600 TABLET, FILM COATED ORAL EVERY 6 HOURS
Status: DISCONTINUED | OUTPATIENT
Start: 2020-09-24 | End: 2020-09-25 | Stop reason: HOSPADM

## 2020-09-23 RX ORDER — ONDANSETRON 2 MG/ML
4 INJECTION INTRAMUSCULAR; INTRAVENOUS EVERY 30 MIN PRN
Status: DISCONTINUED | OUTPATIENT
Start: 2020-09-23 | End: 2020-09-23 | Stop reason: HOSPADM

## 2020-09-23 RX ORDER — NALOXONE HYDROCHLORIDE 0.4 MG/ML
.1-.4 INJECTION, SOLUTION INTRAMUSCULAR; INTRAVENOUS; SUBCUTANEOUS
Status: ACTIVE | OUTPATIENT
Start: 2020-09-23 | End: 2020-09-24

## 2020-09-23 RX ORDER — LIDOCAINE 40 MG/G
CREAM TOPICAL
Status: DISCONTINUED | OUTPATIENT
Start: 2020-09-23 | End: 2020-09-23 | Stop reason: HOSPADM

## 2020-09-23 RX ORDER — DIPHENHYDRAMINE HCL 25 MG
25 CAPSULE ORAL EVERY 6 HOURS PRN
Status: DISCONTINUED | OUTPATIENT
Start: 2020-09-23 | End: 2020-09-25 | Stop reason: HOSPADM

## 2020-09-23 RX ORDER — ACETAMINOPHEN 325 MG/1
975 TABLET ORAL ONCE
Status: DISCONTINUED | OUTPATIENT
Start: 2020-09-23 | End: 2020-09-23 | Stop reason: HOSPADM

## 2020-09-23 RX ORDER — HYDROMORPHONE HYDROCHLORIDE 1 MG/ML
.3-.5 INJECTION, SOLUTION INTRAMUSCULAR; INTRAVENOUS; SUBCUTANEOUS
Status: DISCONTINUED | OUTPATIENT
Start: 2020-09-23 | End: 2020-09-25 | Stop reason: HOSPADM

## 2020-09-23 RX ORDER — SODIUM CHLORIDE, SODIUM LACTATE, POTASSIUM CHLORIDE, CALCIUM CHLORIDE 600; 310; 30; 20 MG/100ML; MG/100ML; MG/100ML; MG/100ML
INJECTION, SOLUTION INTRAVENOUS CONTINUOUS
Status: DISCONTINUED | OUTPATIENT
Start: 2020-09-23 | End: 2020-09-23 | Stop reason: HOSPADM

## 2020-09-23 RX ORDER — SUMATRIPTAN 25 MG/1
25 TABLET, FILM COATED ORAL
Status: DISCONTINUED | OUTPATIENT
Start: 2020-09-23 | End: 2020-09-25 | Stop reason: HOSPADM

## 2020-09-23 RX ORDER — AMOXICILLIN 250 MG
2 CAPSULE ORAL 2 TIMES DAILY
Status: DISCONTINUED | OUTPATIENT
Start: 2020-09-23 | End: 2020-09-25 | Stop reason: HOSPADM

## 2020-09-23 RX ORDER — ONDANSETRON 2 MG/ML
INJECTION INTRAMUSCULAR; INTRAVENOUS PRN
Status: DISCONTINUED | OUTPATIENT
Start: 2020-09-23 | End: 2020-09-23

## 2020-09-23 RX ORDER — ACETAMINOPHEN 325 MG/1
650 TABLET ORAL EVERY 4 HOURS PRN
Status: DISCONTINUED | OUTPATIENT
Start: 2020-09-26 | End: 2020-09-25 | Stop reason: HOSPADM

## 2020-09-23 RX ORDER — KETAMINE HYDROCHLORIDE 10 MG/ML
INJECTION INTRAMUSCULAR; INTRAVENOUS PRN
Status: DISCONTINUED | OUTPATIENT
Start: 2020-09-23 | End: 2020-09-23

## 2020-09-23 RX ORDER — TRAZODONE HYDROCHLORIDE 50 MG/1
150 TABLET, FILM COATED ORAL
Status: DISCONTINUED | OUTPATIENT
Start: 2020-09-23 | End: 2020-09-25 | Stop reason: HOSPADM

## 2020-09-23 RX ORDER — CEFAZOLIN SODIUM 1 G/3ML
1 INJECTION, POWDER, FOR SOLUTION INTRAMUSCULAR; INTRAVENOUS SEE ADMIN INSTRUCTIONS
Status: DISCONTINUED | OUTPATIENT
Start: 2020-09-23 | End: 2020-09-23 | Stop reason: HOSPADM

## 2020-09-23 RX ORDER — LIDOCAINE 40 MG/G
CREAM TOPICAL
Status: DISCONTINUED | OUTPATIENT
Start: 2020-09-23 | End: 2020-09-25 | Stop reason: HOSPADM

## 2020-09-23 RX ORDER — CEFAZOLIN SODIUM IN 0.9 % NACL 3 G/100 ML
3 INTRAVENOUS SOLUTION, PIGGYBACK (ML) INTRAVENOUS
Status: COMPLETED | OUTPATIENT
Start: 2020-09-23 | End: 2020-09-23

## 2020-09-23 RX ORDER — GLYCOPYRROLATE 0.2 MG/ML
INJECTION, SOLUTION INTRAMUSCULAR; INTRAVENOUS PRN
Status: DISCONTINUED | OUTPATIENT
Start: 2020-09-23 | End: 2020-09-23

## 2020-09-23 RX ORDER — NALOXONE HYDROCHLORIDE 0.4 MG/ML
.1-.4 INJECTION, SOLUTION INTRAMUSCULAR; INTRAVENOUS; SUBCUTANEOUS
Status: DISCONTINUED | OUTPATIENT
Start: 2020-09-23 | End: 2020-09-25 | Stop reason: HOSPADM

## 2020-09-23 RX ORDER — LABETALOL 20 MG/4 ML (5 MG/ML) INTRAVENOUS SYRINGE
10
Status: DISCONTINUED | OUTPATIENT
Start: 2020-09-23 | End: 2020-09-23 | Stop reason: HOSPADM

## 2020-09-23 RX ORDER — LIDOCAINE HYDROCHLORIDE 10 MG/ML
INJECTION, SOLUTION INFILTRATION; PERINEURAL PRN
Status: DISCONTINUED | OUTPATIENT
Start: 2020-09-23 | End: 2020-09-23

## 2020-09-23 RX ORDER — FENTANYL CITRATE 50 UG/ML
INJECTION, SOLUTION INTRAMUSCULAR; INTRAVENOUS PRN
Status: DISCONTINUED | OUTPATIENT
Start: 2020-09-23 | End: 2020-09-23

## 2020-09-23 RX ORDER — METOCLOPRAMIDE HYDROCHLORIDE 5 MG/ML
10 INJECTION INTRAMUSCULAR; INTRAVENOUS EVERY 6 HOURS PRN
Status: DISCONTINUED | OUTPATIENT
Start: 2020-09-23 | End: 2020-09-25 | Stop reason: HOSPADM

## 2020-09-23 RX ORDER — SODIUM CHLORIDE 9 MG/ML
INJECTION, SOLUTION INTRAVENOUS CONTINUOUS
Status: DISCONTINUED | OUTPATIENT
Start: 2020-09-23 | End: 2020-09-25 | Stop reason: HOSPADM

## 2020-09-23 RX ORDER — HYDROMORPHONE HYDROCHLORIDE 1 MG/ML
.3-.5 INJECTION, SOLUTION INTRAMUSCULAR; INTRAVENOUS; SUBCUTANEOUS EVERY 5 MIN PRN
Status: DISCONTINUED | OUTPATIENT
Start: 2020-09-23 | End: 2020-09-23 | Stop reason: HOSPADM

## 2020-09-23 RX ORDER — PROPOFOL 10 MG/ML
INJECTION, EMULSION INTRAVENOUS CONTINUOUS PRN
Status: DISCONTINUED | OUTPATIENT
Start: 2020-09-23 | End: 2020-09-23

## 2020-09-23 RX ORDER — PROPOFOL 10 MG/ML
INJECTION, EMULSION INTRAVENOUS PRN
Status: DISCONTINUED | OUTPATIENT
Start: 2020-09-23 | End: 2020-09-23

## 2020-09-23 RX ORDER — OXYCODONE HYDROCHLORIDE 5 MG/1
5-10 TABLET ORAL EVERY 4 HOURS PRN
Status: DISCONTINUED | OUTPATIENT
Start: 2020-09-23 | End: 2020-09-25 | Stop reason: HOSPADM

## 2020-09-23 RX ORDER — DEXAMETHASONE SODIUM PHOSPHATE 4 MG/ML
INJECTION, SOLUTION INTRA-ARTICULAR; INTRALESIONAL; INTRAMUSCULAR; INTRAVENOUS; SOFT TISSUE PRN
Status: DISCONTINUED | OUTPATIENT
Start: 2020-09-23 | End: 2020-09-23

## 2020-09-23 RX ORDER — DIPHENHYDRAMINE HYDROCHLORIDE 50 MG/ML
25 INJECTION INTRAMUSCULAR; INTRAVENOUS EVERY 6 HOURS PRN
Status: DISCONTINUED | OUTPATIENT
Start: 2020-09-23 | End: 2020-09-25 | Stop reason: HOSPADM

## 2020-09-23 RX ORDER — PROCHLORPERAZINE MALEATE 10 MG
10 TABLET ORAL EVERY 6 HOURS PRN
Status: DISCONTINUED | OUTPATIENT
Start: 2020-09-23 | End: 2020-09-25 | Stop reason: HOSPADM

## 2020-09-23 RX ORDER — ONDANSETRON 4 MG/1
4 TABLET, ORALLY DISINTEGRATING ORAL EVERY 6 HOURS PRN
Status: DISCONTINUED | OUTPATIENT
Start: 2020-09-23 | End: 2020-09-25 | Stop reason: HOSPADM

## 2020-09-23 RX ORDER — KETOROLAC TROMETHAMINE 30 MG/ML
30 INJECTION, SOLUTION INTRAMUSCULAR; INTRAVENOUS EVERY 6 HOURS PRN
Status: DISCONTINUED | OUTPATIENT
Start: 2020-09-23 | End: 2020-09-25 | Stop reason: HOSPADM

## 2020-09-23 RX ORDER — AMOXICILLIN 250 MG
1 CAPSULE ORAL 2 TIMES DAILY
Status: DISCONTINUED | OUTPATIENT
Start: 2020-09-23 | End: 2020-09-25 | Stop reason: HOSPADM

## 2020-09-23 RX ORDER — SODIUM CHLORIDE, SODIUM LACTATE, POTASSIUM CHLORIDE, CALCIUM CHLORIDE 600; 310; 30; 20 MG/100ML; MG/100ML; MG/100ML; MG/100ML
INJECTION, SOLUTION INTRAVENOUS CONTINUOUS
Status: DISCONTINUED | OUTPATIENT
Start: 2020-09-23 | End: 2020-09-25 | Stop reason: HOSPADM

## 2020-09-23 RX ORDER — ONDANSETRON 4 MG/1
4 TABLET, ORALLY DISINTEGRATING ORAL EVERY 30 MIN PRN
Status: DISCONTINUED | OUTPATIENT
Start: 2020-09-23 | End: 2020-09-23 | Stop reason: HOSPADM

## 2020-09-23 RX ORDER — FENTANYL CITRATE 50 UG/ML
25-50 INJECTION, SOLUTION INTRAMUSCULAR; INTRAVENOUS
Status: DISCONTINUED | OUTPATIENT
Start: 2020-09-23 | End: 2020-09-23 | Stop reason: HOSPADM

## 2020-09-23 RX ORDER — MAGNESIUM HYDROXIDE 1200 MG/15ML
LIQUID ORAL PRN
Status: DISCONTINUED | OUTPATIENT
Start: 2020-09-23 | End: 2020-09-23 | Stop reason: HOSPADM

## 2020-09-23 RX ORDER — NEOSTIGMINE METHYLSULFATE 1 MG/ML
VIAL (ML) INJECTION PRN
Status: DISCONTINUED | OUTPATIENT
Start: 2020-09-23 | End: 2020-09-23

## 2020-09-23 RX ORDER — TOPIRAMATE 25 MG/1
25 TABLET, FILM COATED ORAL 2 TIMES DAILY
Status: DISCONTINUED | OUTPATIENT
Start: 2020-09-23 | End: 2020-09-25 | Stop reason: HOSPADM

## 2020-09-23 RX ORDER — ONDANSETRON 2 MG/ML
4 INJECTION INTRAMUSCULAR; INTRAVENOUS EVERY 6 HOURS PRN
Status: DISCONTINUED | OUTPATIENT
Start: 2020-09-23 | End: 2020-09-25 | Stop reason: HOSPADM

## 2020-09-23 RX ORDER — ACETAMINOPHEN 325 MG/1
975 TABLET ORAL EVERY 8 HOURS
Status: DISCONTINUED | OUTPATIENT
Start: 2020-09-23 | End: 2020-09-25 | Stop reason: HOSPADM

## 2020-09-23 RX ORDER — METOCLOPRAMIDE 10 MG/1
10 TABLET ORAL EVERY 6 HOURS PRN
Status: DISCONTINUED | OUTPATIENT
Start: 2020-09-23 | End: 2020-09-25 | Stop reason: HOSPADM

## 2020-09-23 RX ORDER — MEPERIDINE HYDROCHLORIDE 25 MG/ML
12.5 INJECTION INTRAMUSCULAR; INTRAVENOUS; SUBCUTANEOUS EVERY 5 MIN PRN
Status: DISCONTINUED | OUTPATIENT
Start: 2020-09-23 | End: 2020-09-23 | Stop reason: HOSPADM

## 2020-09-23 RX ADMIN — ROCURONIUM BROMIDE 10 MG: 10 INJECTION INTRAVENOUS at 09:30

## 2020-09-23 RX ADMIN — MIDAZOLAM 2 MG: 1 INJECTION INTRAMUSCULAR; INTRAVENOUS at 07:56

## 2020-09-23 RX ADMIN — GLYCOPYRROLATE 0.6 MG: 0.2 INJECTION, SOLUTION INTRAMUSCULAR; INTRAVENOUS at 10:11

## 2020-09-23 RX ADMIN — FENTANYL CITRATE 50 MCG: 0.05 INJECTION, SOLUTION INTRAMUSCULAR; INTRAVENOUS at 11:45

## 2020-09-23 RX ADMIN — DEXAMETHASONE SODIUM PHOSPHATE 4 MG: 4 INJECTION, SOLUTION INTRA-ARTICULAR; INTRALESIONAL; INTRAMUSCULAR; INTRAVENOUS; SOFT TISSUE at 08:04

## 2020-09-23 RX ADMIN — ROCURONIUM BROMIDE 50 MG: 10 INJECTION INTRAVENOUS at 08:05

## 2020-09-23 RX ADMIN — ONDANSETRON HYDROCHLORIDE 4 MG: 2 INJECTION, SOLUTION INTRAVENOUS at 10:01

## 2020-09-23 RX ADMIN — Medication 1 G: at 10:09

## 2020-09-23 RX ADMIN — HYDROMORPHONE HYDROCHLORIDE 0.5 MG: 1 INJECTION, SOLUTION INTRAMUSCULAR; INTRAVENOUS; SUBCUTANEOUS at 17:11

## 2020-09-23 RX ADMIN — Medication 30 MG: at 08:04

## 2020-09-23 RX ADMIN — HYDROMORPHONE HYDROCHLORIDE 0.5 MG: 1 INJECTION, SOLUTION INTRAMUSCULAR; INTRAVENOUS; SUBCUTANEOUS at 12:12

## 2020-09-23 RX ADMIN — ROCURONIUM BROMIDE 20 MG: 10 INJECTION INTRAVENOUS at 08:30

## 2020-09-23 RX ADMIN — PROPOFOL 200 MG: 10 INJECTION, EMULSION INTRAVENOUS at 08:04

## 2020-09-23 RX ADMIN — FENTANYL CITRATE 100 MCG: 50 INJECTION, SOLUTION INTRAMUSCULAR; INTRAVENOUS at 08:04

## 2020-09-23 RX ADMIN — GLYCOPYRROLATE 0.2 MG: 0.2 INJECTION, SOLUTION INTRAMUSCULAR; INTRAVENOUS at 08:41

## 2020-09-23 RX ADMIN — Medication 3 G: at 08:13

## 2020-09-23 RX ADMIN — LIDOCAINE HYDROCHLORIDE 30 MG: 10 INJECTION, SOLUTION INFILTRATION; PERINEURAL at 08:04

## 2020-09-23 RX ADMIN — SODIUM CHLORIDE, POTASSIUM CHLORIDE, SODIUM LACTATE AND CALCIUM CHLORIDE: 600; 310; 30; 20 INJECTION, SOLUTION INTRAVENOUS at 22:08

## 2020-09-23 RX ADMIN — HYDROMORPHONE HYDROCHLORIDE 1 MG: 1 INJECTION, SOLUTION INTRAMUSCULAR; INTRAVENOUS; SUBCUTANEOUS at 08:42

## 2020-09-23 RX ADMIN — DOCUSATE SODIUM AND SENNOSIDES 1 TABLET: 8.6; 5 TABLET ORAL at 22:09

## 2020-09-23 RX ADMIN — Medication 10 MG: at 10:01

## 2020-09-23 RX ADMIN — Medication 10 MG: at 09:01

## 2020-09-23 RX ADMIN — KETOROLAC TROMETHAMINE 30 MG: 30 INJECTION, SOLUTION INTRAMUSCULAR at 22:09

## 2020-09-23 RX ADMIN — DEXMEDETOMIDINE HYDROCHLORIDE 0.2 MCG/KG/HR: 100 INJECTION, SOLUTION INTRAVENOUS at 08:10

## 2020-09-23 RX ADMIN — SODIUM CHLORIDE, POTASSIUM CHLORIDE, SODIUM LACTATE AND CALCIUM CHLORIDE: 600; 310; 30; 20 INJECTION, SOLUTION INTRAVENOUS at 07:56

## 2020-09-23 RX ADMIN — FENTANYL CITRATE 50 MCG: 0.05 INJECTION, SOLUTION INTRAMUSCULAR; INTRAVENOUS at 11:33

## 2020-09-23 RX ADMIN — SODIUM CHLORIDE, POTASSIUM CHLORIDE, SODIUM LACTATE AND CALCIUM CHLORIDE: 600; 310; 30; 20 INJECTION, SOLUTION INTRAVENOUS at 14:00

## 2020-09-23 RX ADMIN — ROCURONIUM BROMIDE 20 MG: 10 INJECTION INTRAVENOUS at 09:01

## 2020-09-23 RX ADMIN — HYDROMORPHONE HYDROCHLORIDE 0.5 MG: 1 INJECTION, SOLUTION INTRAMUSCULAR; INTRAVENOUS; SUBCUTANEOUS at 13:57

## 2020-09-23 RX ADMIN — PROPOFOL 70 MCG/KG/MIN: 10 INJECTION, EMULSION INTRAVENOUS at 08:10

## 2020-09-23 RX ADMIN — Medication 5 MG: at 10:11

## 2020-09-23 RX ADMIN — KETOROLAC TROMETHAMINE 30 MG: 30 INJECTION, SOLUTION INTRAMUSCULAR at 15:56

## 2020-09-23 RX ADMIN — SODIUM CHLORIDE, POTASSIUM CHLORIDE, SODIUM LACTATE AND CALCIUM CHLORIDE: 600; 310; 30; 20 INJECTION, SOLUTION INTRAVENOUS at 10:02

## 2020-09-23 SDOH — HEALTH STABILITY: MENTAL HEALTH: CURRENT SMOKER: 0

## 2020-09-23 ASSESSMENT — MIFFLIN-ST. JEOR: SCORE: 2105.07

## 2020-09-23 NOTE — ANESTHESIA CARE TRANSFER NOTE
Patient: Keerthi Sloan    Procedure(s):  TOTAL ABDOMINAL HYSTERECTOMY, BILATERAL SALPINGECTOMY    Diagnosis: Fibroid uterus [D25.9]  Menorrhagia [N92.0]  Diagnosis Additional Information: No value filed.    Anesthesia Type:   General     Note:  Airway :Face Mask and Oral Airway  Patient transferred to:PACU  Comments: Pt to PACU without issue. VSS. RRR. SBB. FiO2 via FM at 10L.  Pt resting comfortably.  Report to RN at bedside. Handoff Report: Identifed the Patient, Identified the Reponsible Provider, Reviewed the pertinent medical history, Discussed the surgical course, Reviewed Intra-OP anesthesia mangement and issues during anesthesia, Set expectations for post-procedure period and Allowed opportunity for questions and acknowledgement of understanding      Vitals: (Last set prior to Anesthesia Care Transfer)    CRNA VITALS  9/23/2020 1005 - 9/23/2020 1041      9/23/2020             Pulse:  (!) 48    SpO2:  93 %    Resp Rate (observed):  (!) 2                Electronically Signed By: MADELEINE Cooper CRNA  September 23, 2020  10:41 AM

## 2020-09-23 NOTE — ANESTHESIA PREPROCEDURE EVALUATION
Anesthesia Pre-Procedure Evaluation    Patient: Keerthi Solan   MRN: [id number] : 1970          Preoperative Diagnosis: Fibroid uterus [D25.9]  Menorrhagia [N92.0]    Procedure(s):  TOTAL ABDOMINAL HYSTERECTOMY, BILATERAL SALPINGECTOMY    Past Medical History:   Diagnosis Date     Anemia      Diabetes (H)     pre diabetes     Fibroids      History of blood transfusion      Migraines      Obesity      Prediabetes      Sleep apnea     Uses a CPAP     Past Surgical History:   Procedure Laterality Date     HERNIA REPAIR      inguinal     KNEE SURGERY Left     left knee partial meniscus repair     TUBAL LIGATION      [facility]     Anesthesia Evaluation     . Pt has had prior anesthetic. Type: General    No history of anesthetic complications          ROS/MED HX    ENT/Pulmonary:     (+)sleep apnea, uses CPAP , . .    Neurologic:  - neg neurologic ROS     Cardiovascular:  - neg cardiovascular ROS       METS/Exercise Tolerance:     Hematologic: Comments: Hgb 10.5    (+) Anemia, -      Musculoskeletal:   (+) arthritis,  -       GI/Hepatic:  - neg GI/hepatic ROS       Renal/Genitourinary:  - ROS Renal section negative       Endo:     (+) type II DM Obesity, .      Psychiatric:  - neg psychiatric ROS       Infectious Disease:  - neg infectious disease ROS       Malignancy:         Other:    - neg other ROS                      Physical Exam  Normal systems: cardiovascular, pulmonary and dental    Airway   Mallampati: II  TM distance: >3 FB  Neck ROM: full    Dental     Cardiovascular       Pulmonary             Lab Results   Component Value Date    WBC 5.4 2020    HGB 10.5 (L) 2020    HCT 35.7 2020     2020     2020    POTASSIUM 3.7 2020    CHLORIDE 106 2020    CO2 26 2020    BUN 12 2020    CR 0.64 2020     (H) 2020    CASTILLO 8.5 2020    ALBUMIN 3.3 (L) 2020    PROTTOTAL 7.5 2020    ALT 19 2020    AST 13  "06/29/2020    ALKPHOS 47 06/29/2020    BILITOTAL 0.3 06/29/2020    TSH 0.91 05/26/2016    T4 1.09 05/26/2016    HCG Negative 05/18/2020       Preop Vitals  BP Readings from Last 3 Encounters:   09/23/20 118/68   09/11/20 136/82   08/28/20 (!) 142/86    Pulse Readings from Last 3 Encounters:   09/11/20 63   08/28/20 92   07/29/20 76      Resp Readings from Last 3 Encounters:   09/23/20 16   09/11/20 16   07/29/20 20    SpO2 Readings from Last 3 Encounters:   09/23/20 96%   09/11/20 98%   07/24/20 98%      Temp Readings from Last 1 Encounters:   09/23/20 97.4  F (36.3  C) (Temporal)    Ht Readings from Last 1 Encounters:   09/23/20 1.702 m (5' 7\")      Wt Readings from Last 1 Encounters:   09/23/20 144.2 kg (318 lb)    Estimated body mass index is 49.81 kg/m  as calculated from the following:    Height as of this encounter: 1.702 m (5' 7\").    Weight as of this encounter: 144.2 kg (318 lb).       Anesthesia Plan      History & Physical Review  History and physical reviewed and following examination; no interval change.    ASA Status:  3 .    NPO Status:  > 8 hours    Plan for General with Intravenous induction. Maintenance will be Balanced.    PONV prophylaxis:  Ondansetron (or other 5HT-3) and Dexamethasone or Solumedrol    The patient is not a current smoker      Postoperative Care  Postoperative pain management:  IV analgesics, Oral pain medications and Multi-modal analgesia.      Consents  Anesthetic plan, risks, benefits and alternatives discussed with:  Patient..                 Iftikhar Centeno MD                    .  "

## 2020-09-23 NOTE — OP NOTE
BREE/BS OPERATIVE REPORT     DATE OF PROCEDURE:   STAFF SURGEON: Lidya Jain MD  PREOPERATIVE DIAGNOSIS: large uterine fibroids, menorrhagia, anemia  POSTOPERATIVE DIAGNOSIS: Same  PROCEDURE: Total abdominal hysterectomy,  bilateral salpingectomy   ASSISTANT SURGEON:  Dr. Alves   ANESTHESIA: General endotracheal anesthesia.  COMPLICATIONS: none noted  URINE OUTPUT: 450 cc  IV FLUIDS: LR 1100 cc  EBL: 350 cc  SPECIMENS: uterus with cervix, bilateral fallopian tube segments  FINDINGS: enlarged uterus weighing >1100 g, segments of bilateral tubes status post prior tubal ligation, normal ovaries and bowel     INDICATIONS: heavy bleeding and bulk symptoms secondary to large intrauterine fibroids. She was counseled on the risks, benefits, and alternatives of the procedure, and informed consent was signed.     DESCRIPTION OF PROCEDURE:  The patient was taken to the operating room where general endotracheal anesthesia was induced without difficulty and found to be adequate. She was prepped and draped in the normal sterile fashion in the supine position. A Greenberg catheter was placed in the bladder. A Pfannensteil incision was made and dissection carried with electrocautery to the fascia, which was then incised sharply. The myofascial place was developed in the cephalad and caudad directions, and the recti muscles were gently  in the midline. The peritoneal cavity was entered without difficulty. Above findings were noted. Patient was placed in Trendelenburg position. The bowels were packed from the pelvis with a long laparotomy pack, and an Leandro retractor was placed. Long, curved clamps were used to grasp each cornua for traction and exposure. It was difficult to access even the round ligament due to the width of the large fibroid, so dilute vasopressin solution was injected in the area of the fundus where the largest fibroid was accessible.  An incision was made in the uterine serosa, and the myometrium was  carefully dissected with electrocautery from the capsule of the fibroid.  There was not an easily accessible plane.  After a significant portion of the myometrium was dissected from the superior portion of the large fibroid, it was morcellated to debulk the central portion of the uterus.  Approximately one third of the fibroid was removed.  At this point, the ligaments could be accessed.  The left round ligament was grasped, divided, and ligated with a stick tie. The left broad ligament was opened sharply, and the ureter identified on the medial leaf of the broad ligament in its normal anatomic position. The left utero-ovarian ligament was grasped, ligated, and divided with the LigaSure device, well away from the position of the left ureter. Uterine vessels were skeletonized. The bladder flap partially developed. Attention was then turned to the opposite side, where these steps were repeated, again visualizing the ureter on the right side to be sure it was free of the operative field. The bladder flap was developed anteriorly. The uterine vessels were skeletonized on the right, and the LigaSure device was used to grasp, coagulate, and divide the cardinal ligament in several bites. The cardinal ligament on the left side was divided in a similar fashion.  Sequential bites of the remaining cardinal ligament on the right and then the left side were taken with straight Heather clamps and the ligaments divided with a scalpel.  Each of these pedicles was sutured.  Curved Heather clamps were then placed across the vagina at the level of the cervix. The vagina was entered on the left side, and circumferentially incised. The uterus and cervix were thus handed off the operative field. The vaginal cuff was closed with a running stitch of 0 Vicryl. Hemostasis was then noted to be excellent.  The remaining segments of fallopian tube were excised with the LigaSure device.  All instruments and packs were removed from the pelvis.  Counts were reported to me as correct. The subfascial space was inspected and bleeding vessels were cauterized. Fascia was closed with looped 0 PDS suture. The subcutaneous tissues were irrigated and the space closed with 2.0 Vicryl interrupted sutures. Skin was closed with subcuticular 3.0 monocryl. Steri strips were placed on the incision, and a sterile dressing applied.     Urine output was adequate and clear. Patient was awakened and taken to recovery in good condition.    I requested the aid of Dr. Alves for exposure and assistance with the case, which was anticipated to be difficult based on the size of the uterus and fibroids.    Lidya Jain MD  10:48 AM

## 2020-09-23 NOTE — ANESTHESIA POSTPROCEDURE EVALUATION
Patient: Keerthi Sloan    Procedure(s):  TOTAL ABDOMINAL HYSTERECTOMY, BILATERAL SALPINGECTOMY    Diagnosis:Fibroid uterus [D25.9]  Menorrhagia [N92.0]  Diagnosis Additional Information: No value filed.    Anesthesia Type:  General    Note:  Anesthesia Post Evaluation    Patient location during evaluation: PACU  Patient participation: Able to fully participate in evaluation  Level of consciousness: awake and alert  Pain management: adequate  multimodal analgesia used between 6 hours prior to anesthesia start to PACU dischargeAirway patency: patent  Cardiovascular status: acceptable  Respiratory status: acceptable  two or more mitigation strategies used for obstructive sleep apneaHydration status: acceptable  PONV: none     Anesthetic complications: None          Last vitals:  Vitals:    09/23/20 1230 09/23/20 1245 09/23/20 1335   BP: 112/72 117/73 (P) 114/72   Pulse: 63 63    Resp: 14 12    Temp: 98.3  F (36.8  C)     SpO2: 99% 100%          Electronically Signed By: Iftikhar Centeno MD  September 23, 2020  1:46 PM

## 2020-09-24 LAB
COPATH REPORT: NORMAL
CREAT SERPL-MCNC: 0.65 MG/DL (ref 0.52–1.04)
GFR SERPL CREATININE-BSD FRML MDRD: >90 ML/MIN/{1.73_M2}
GLUCOSE SERPL-MCNC: 110 MG/DL (ref 70–99)
HGB BLD-MCNC: 9.2 G/DL (ref 11.7–15.7)
INTERPRETATION ECG - MUSE: NORMAL

## 2020-09-24 PROCEDURE — 25800030 ZZH RX IP 258 OP 636: Performed by: OBSTETRICS & GYNECOLOGY

## 2020-09-24 PROCEDURE — 82947 ASSAY GLUCOSE BLOOD QUANT: CPT | Performed by: OBSTETRICS & GYNECOLOGY

## 2020-09-24 PROCEDURE — 12000013 ZZH R&B PEDS

## 2020-09-24 PROCEDURE — 25000132 ZZH RX MED GY IP 250 OP 250 PS 637: Performed by: OBSTETRICS & GYNECOLOGY

## 2020-09-24 PROCEDURE — 82565 ASSAY OF CREATININE: CPT | Performed by: OBSTETRICS & GYNECOLOGY

## 2020-09-24 PROCEDURE — 25000128 H RX IP 250 OP 636: Performed by: OBSTETRICS & GYNECOLOGY

## 2020-09-24 PROCEDURE — 36415 COLL VENOUS BLD VENIPUNCTURE: CPT | Performed by: OBSTETRICS & GYNECOLOGY

## 2020-09-24 PROCEDURE — 85018 HEMOGLOBIN: CPT | Performed by: OBSTETRICS & GYNECOLOGY

## 2020-09-24 RX ORDER — SIMETHICONE 80 MG
160 TABLET,CHEWABLE ORAL EVERY 6 HOURS PRN
Status: DISCONTINUED | OUTPATIENT
Start: 2020-09-24 | End: 2020-09-24

## 2020-09-24 RX ORDER — SIMETHICONE 80 MG
80 TABLET,CHEWABLE ORAL EVERY 6 HOURS PRN
Status: DISCONTINUED | OUTPATIENT
Start: 2020-09-24 | End: 2020-09-25 | Stop reason: HOSPADM

## 2020-09-24 RX ADMIN — KETOROLAC TROMETHAMINE 30 MG: 30 INJECTION, SOLUTION INTRAMUSCULAR at 03:52

## 2020-09-24 RX ADMIN — ACETAMINOPHEN 975 MG: 325 TABLET, FILM COATED ORAL at 12:48

## 2020-09-24 RX ADMIN — METFORMIN HYDROCHLORIDE 500 MG: 500 TABLET, FILM COATED ORAL at 08:00

## 2020-09-24 RX ADMIN — ACETAMINOPHEN 975 MG: 325 TABLET, FILM COATED ORAL at 20:05

## 2020-09-24 RX ADMIN — SODIUM CHLORIDE, POTASSIUM CHLORIDE, SODIUM LACTATE AND CALCIUM CHLORIDE: 600; 310; 30; 20 INJECTION, SOLUTION INTRAVENOUS at 05:47

## 2020-09-24 RX ADMIN — METFORMIN HYDROCHLORIDE 500 MG: 500 TABLET, FILM COATED ORAL at 18:51

## 2020-09-24 RX ADMIN — OXYCODONE HYDROCHLORIDE 5 MG: 5 TABLET ORAL at 11:08

## 2020-09-24 RX ADMIN — DOCUSATE SODIUM AND SENNOSIDES 1 TABLET: 8.6; 5 TABLET ORAL at 08:00

## 2020-09-24 RX ADMIN — ACETAMINOPHEN 975 MG: 325 TABLET, FILM COATED ORAL at 03:50

## 2020-09-24 RX ADMIN — IBUPROFEN 600 MG: 600 TABLET, FILM COATED ORAL at 20:04

## 2020-09-24 RX ADMIN — IBUPROFEN 600 MG: 600 TABLET, FILM COATED ORAL at 14:02

## 2020-09-24 RX ADMIN — DOCUSATE SODIUM AND SENNOSIDES 1 TABLET: 8.6; 5 TABLET ORAL at 20:05

## 2020-09-24 RX ADMIN — IBUPROFEN 600 MG: 600 TABLET, FILM COATED ORAL at 08:01

## 2020-09-24 RX ADMIN — SIMETHICONE CHEW TAB 80 MG 80 MG: 80 TABLET ORAL at 09:12

## 2020-09-24 RX ADMIN — TOPIRAMATE 25 MG: 25 TABLET, FILM COATED ORAL at 08:00

## 2020-09-24 RX ADMIN — TOPIRAMATE 25 MG: 25 TABLET, FILM COATED ORAL at 20:37

## 2020-09-24 RX ADMIN — SIMETHICONE CHEW TAB 80 MG 80 MG: 80 TABLET ORAL at 15:32

## 2020-09-24 RX ADMIN — MAGNESIUM HYDROXIDE 30 ML: 400 SUSPENSION ORAL at 21:00

## 2020-09-24 ASSESSMENT — ACTIVITIES OF DAILY LIVING (ADL)
COGNITION: 0 - NO COGNITION ISSUES REPORTED
RETIRED_EATING: 0-->INDEPENDENT
BATHING: 0-->INDEPENDENT
TRANSFERRING: 0-->INDEPENDENT
SWALLOWING: 0-->SWALLOWS FOODS/LIQUIDS WITHOUT DIFFICULTY
TOILETING: 0-->INDEPENDENT
AMBULATION: 0-->INDEPENDENT
FALL_HISTORY_WITHIN_LAST_SIX_MONTHS: NO
DRESS: 0-->INDEPENDENT
RETIRED_COMMUNICATION: 0-->UNDERSTANDS/COMMUNICATES WITHOUT DIFFICULTY

## 2020-09-24 NOTE — PROGRESS NOTES
Patient switched to continuous pulse oximetry monitoring from capnography due to placement of home CPAP machine.

## 2020-09-24 NOTE — PLAN OF CARE
Has slept well. Denied pain while laying still. Scant vaginal drainage. Dressing is dry. Cold pack applied. Bowel sounds heard but has not passed gas. Rgeenberg patent. Encourage IS use. Walk in duval. Daily weight. Will have AM labs drawn.

## 2020-09-24 NOTE — PROGRESS NOTES
POD#1    Doing well.   Pain well controlled on oral and IV pain medications.   Tolerating regular diet.   Ambulating and denies pre-syncopal and vertiginous episodes   Greenberg catheter in place. Denies flatus. Feels abdominal bloating   Denies vaginal bleeding.     Vitals:    09/23/20 2209 09/23/20 2342 09/24/20 0200 09/24/20 0345   BP:    99/67   BP Location:       Pulse: 71 74 60 61   Resp:  16 14 16   Temp: 97.8  F (36.6  C)   98.6  F (37  C)   TempSrc: Oral   Oral   SpO2: 96% 96% 96% 95%   Weight:       Height:         Urine output: 1575 mL in last 24 hours; urine output not recorded since 2300, 9/23    General Appearance: NAD  Abdomen: Soft, NT, ND  Incision: Bandaged;  appears dry  Pelvic: Deferred  Ext: Non tender, non edematous     Hemoglobin   Date Value Ref Range Status   09/24/2020 9.2 (L) 11.7 - 15.7 g/dL Final   09/11/2020 10.5 (L) 11.7 - 15.7 g/dL Final       A/P: 50 year old POD#1 s/p total abdominal hysterectomy, bilateral salpingectomy. Hemodynamically stable.   -- routine post-op cares  -- adequate urine output; remove Greenberg catheter this AM  -- simethicone ordered for gas pains   -- encouraged to ambulate; regular diet; transition to exclusively oral pain medication  -- anticipate discharge home on POD#2    Riki Hagan MD  [facility]

## 2020-09-24 NOTE — PLAN OF CARE
Vital signs: Stable; afebrile  Pain Control: Pain goal: 4; Pain controlled with IV toradol x 2 and IV dilaudid x 1  Diet: Advanced to a regular diet; tolerating well  Output: Urethral catheter in place; patent and draining clear, yellow urine. Catheter cares completed.                Activity: Up walking in hallway x 1.   Updates: Continuous IV fluids running. Dressing C/D/I. Active BS.     Will continue to monitor and provide for cares.

## 2020-09-24 NOTE — PLAN OF CARE
Vital Signs: VSS. Afebrile.   Pain/Comfort: Pain well managed with scheduled tylenol and motrin. Oxycodone x1. Using ice pack and abdominal binder for comfort.  Assessment: Bowel sounds active. Passing gas. Incision with steristrips C/D/I.  Diet: Tolerating regular diet  Output: Greenberg catheter out this morning, voiding well.  Activity/Ambulation: Up in room independently. Sat up at side of bed. Showered. Encouraged to ambulate duval.  Social:  at bedside.

## 2020-09-25 VITALS
HEART RATE: 55 BPM | BODY MASS INDEX: 45.99 KG/M2 | HEIGHT: 67 IN | SYSTOLIC BLOOD PRESSURE: 138 MMHG | RESPIRATION RATE: 16 BRPM | WEIGHT: 293 LBS | OXYGEN SATURATION: 97 % | DIASTOLIC BLOOD PRESSURE: 83 MMHG | TEMPERATURE: 98.1 F

## 2020-09-25 PROCEDURE — 25000132 ZZH RX MED GY IP 250 OP 250 PS 637: Performed by: OBSTETRICS & GYNECOLOGY

## 2020-09-25 RX ORDER — IBUPROFEN 200 MG
600 TABLET ORAL EVERY 6 HOURS PRN
COMMUNITY
Start: 2020-09-25

## 2020-09-25 RX ORDER — ACETAMINOPHEN 325 MG/1
650 TABLET ORAL EVERY 4 HOURS PRN
COMMUNITY
Start: 2020-09-26 | End: 2021-05-25

## 2020-09-25 RX ORDER — OXYCODONE HYDROCHLORIDE 5 MG/1
5-10 TABLET ORAL EVERY 4 HOURS PRN
Qty: 12 TABLET | Refills: 0 | Status: SHIPPED | OUTPATIENT
Start: 2020-09-25 | End: 2020-10-08

## 2020-09-25 RX ADMIN — OXYCODONE HYDROCHLORIDE 5 MG: 5 TABLET ORAL at 10:36

## 2020-09-25 RX ADMIN — DOCUSATE SODIUM AND SENNOSIDES 1 TABLET: 8.6; 5 TABLET ORAL at 08:19

## 2020-09-25 RX ADMIN — IBUPROFEN 600 MG: 600 TABLET, FILM COATED ORAL at 02:05

## 2020-09-25 RX ADMIN — METFORMIN HYDROCHLORIDE 500 MG: 500 TABLET, FILM COATED ORAL at 08:19

## 2020-09-25 RX ADMIN — ACETAMINOPHEN 975 MG: 325 TABLET, FILM COATED ORAL at 04:11

## 2020-09-25 RX ADMIN — TOPIRAMATE 25 MG: 25 TABLET, FILM COATED ORAL at 08:19

## 2020-09-25 RX ADMIN — IBUPROFEN 600 MG: 600 TABLET, FILM COATED ORAL at 08:18

## 2020-09-25 NOTE — DISCHARGE SUMMARY
[facility] Post-Op / Discharge Summary Note         Assessment and Plan:    Assessment:   Post-operative day #2  BREE bilateral salpingectomy  large uterine fibroids, menorrhagia, anemia   Path report shows:   FINAL DIAGNOSIS:   Uterus, resection:   - Cervix with endocervical polyps, nabothian cysts and chronic cervicitis.     Negative for dysplasia.   - Secretory to inactive endometrium.   - Adenomyosis.   - Leiomyomas, including large leiomyoma with hydropic degeneration.   - Unremarkable serosa.   - Negative for endometrial polyp, hyperplasia and malignancy.     Fallopian tubes, bilateral, resection:   - Paratubal cyst.   - Hydrosalpinx (unilateral).   - Negative for preneoplastic and neoplastic processes.      Doing well. Pos BM this am  Clean wound without signs of infection.  Normal healing wound.  No immediate surgical complications identified.  No excessive bleeding  Pain well-controlled.      Plan:   Ambulate  Advance activity as tolerated  Pain control measures  Pt desires discharge home today  No lifting > 10 lbs for 6 wks  Take your tempature twice daily for 3 days and call if > 100.4  Nothing in vagina for 8 wks  Continue taking  MVI daily for 1 month             Interval History:   Doing well.  Continues to improve.  Pain is well-controlled.  No fevers.            Significant Problems:      Past Medical History:   Diagnosis Date     Anemia      Diabetes (H)     pre diabetes     Fibroids      History of blood transfusion      Migraines      Obesity      Prediabetes      Sleep apnea     Uses a CPAP             Review of Systems:    The patient denies any chest pain, shortness of breath, excessive pain, fever, chills, purulent drainage from the wound, nausea or vomiting.          Medications:     All medications related to the patient's surgery have been reviewed  Current Facility-Administered Medications   Medication     [START ON 9/26/2020] acetaminophen (TYLENOL) tablet 650 mg      acetaminophen (TYLENOL) tablet 975 mg     benzocaine-menthol (CHLORASEPTIC) 6-10 MG lozenge 1 lozenge     diphenhydrAMINE (BENADRYL) capsule 25 mg    Or     diphenhydrAMINE (BENADRYL) injection 25 mg     HYDROmorphone (PF) (DILAUDID) injection 0.3-0.5 mg     ibuprofen (ADVIL/MOTRIN) tablet 600 mg     ketorolac (TORADOL) injection 30 mg     lactated ringers infusion     lidocaine (LMX4) kit     lidocaine 1 % 0.1-1 mL     magnesium hydroxide (MILK OF MAGNESIA) suspension 30 mL     metFORMIN (GLUCOPHAGE) tablet 500 mg     metoclopramide (REGLAN) tablet 10 mg    Or     metoclopramide (REGLAN) injection 10 mg     naloxone (NARCAN) injection 0.1-0.4 mg     No Medication Sleep Aids for this Patient     omeprazole (priLOSEC) CR capsule 40 mg     ondansetron (ZOFRAN-ODT) ODT tab 4 mg    Or     ondansetron (ZOFRAN) injection 4 mg     oxyCODONE (ROXICODONE) tablet 5-10 mg     prochlorperazine (COMPAZINE) injection 10 mg    Or     prochlorperazine (COMPAZINE) tablet 10 mg     senna-docusate (SENOKOT-S/PERICOLACE) 8.6-50 MG per tablet 1 tablet    Or     senna-docusate (SENOKOT-S/PERICOLACE) 8.6-50 MG per tablet 2 tablet     sertraline (ZOLOFT) tablet 150 mg     simethicone (MYLICON) chewable tablet 80 mg     sodium chloride (PF) 0.9% PF flush 3 mL     sodium chloride 0.9% infusion     SUMAtriptan (IMITREX) tablet 25 mg     topiramate (TOPAMAX) tablet 25 mg     traZODone (DESYREL) tablet 150 mg             Physical Exam:   Vitals were reviewed  All vitals stable  Temp: 98.1  F (36.7  C) Temp src: Oral BP: 138/83 Pulse: 55   Resp: 16 SpO2: 97 % O2 Device: None (Room air)    Wound clean and dry with minimal or no drainage.  Surrounding skin with minimal erythema.          Data:     All laboratory data related to this surgery reviewed  Hemoglobin   Date Value Ref Range Status   09/24/2020 9.2 (L) 11.7 - 15.7 g/dL Final   09/11/2020 10.5 (L) 11.7 - 15.7 g/dL Final   08/19/2020 11.8 11.7 - 15.7 g/dL Final   07/11/2020 9.3 (L) 11.7 -  15.7 g/dL Final   06/29/2020 8.9 (L) 11.7 - 15.7 g/dL Final     Comment:     Results confirmed by repeat test     No imaging studies have been ordered    Panchito Narvaez MD

## 2020-09-25 NOTE — PLAN OF CARE
VSS, afebrile.  Pain is being well controlled with tylenol and ibuprofen.  Incision site is closed with steri strips and is C/D/I.  Bowel sounds, WNL and PT had a BM this am, which is something that she stated she has had problems with in the past.  Patient is ambulating in room and in the hallway.      Discharge teaching was completed with PT,  present at the bedside as well.  Instructed PT on measures for pain control, s/s of infection and blood clots, and resources for questions and follow-up were given. All questions answered. PT was discharged home with .

## 2020-09-25 NOTE — PROGRESS NOTES
[facility] Gynecology Post-Op / Progress Note         Assessment and Plan:    Assessment:   Post-operative day #2  BREE bilateral salpingectomy  large uterine fibroids, menorrhagia, anemia   Path report shows:   FINAL DIAGNOSIS:   Uterus, resection:   - Cervix with endocervical polyps, nabothian cysts and chronic cervicitis.     Negative for dysplasia.   - Secretory to inactive endometrium.   - Adenomyosis.   - Leiomyomas, including large leiomyoma with hydropic degeneration.   - Unremarkable serosa.   - Negative for endometrial polyp, hyperplasia and malignancy.     Fallopian tubes, bilateral, resection:   - Paratubal cyst.   - Hydrosalpinx (unilateral).   - Negative for preneoplastic and neoplastic processes.      Doing well. Pos BM this am  Clean wound without signs of infection.  Normal healing wound.  No immediate surgical complications identified.  No excessive bleeding  Pain well-controlled.      Plan:   Ambulate  Advance activity as tolerated  Pain control measures  Pt desires discharge home today  No lifting > 10 lbs for 6 wks  Take your tempature twice daily for 3 days and call if > 100.4  Nothing in vagina for 8 wks  Continue taking  MVI daily for 1 month             Interval History:   Doing well.  Continues to improve.  Pain is well-controlled.  No fevers.            Significant Problems:      Past Medical History:   Diagnosis Date     Anemia      Diabetes (H)     pre diabetes     Fibroids      History of blood transfusion      Migraines      Obesity      Prediabetes      Sleep apnea     Uses a CPAP             Review of Systems:    The patient denies any chest pain, shortness of breath, excessive pain, fever, chills, purulent drainage from the wound, nausea or vomiting.          Medications:     All medications related to the patient's surgery have been reviewed  Current Facility-Administered Medications   Medication     [START ON 9/26/2020] acetaminophen (TYLENOL) tablet 650 mg     acetaminophen  (TYLENOL) tablet 975 mg     benzocaine-menthol (CHLORASEPTIC) 6-10 MG lozenge 1 lozenge     diphenhydrAMINE (BENADRYL) capsule 25 mg    Or     diphenhydrAMINE (BENADRYL) injection 25 mg     HYDROmorphone (PF) (DILAUDID) injection 0.3-0.5 mg     ibuprofen (ADVIL/MOTRIN) tablet 600 mg     ketorolac (TORADOL) injection 30 mg     lactated ringers infusion     lidocaine (LMX4) kit     lidocaine 1 % 0.1-1 mL     magnesium hydroxide (MILK OF MAGNESIA) suspension 30 mL     metFORMIN (GLUCOPHAGE) tablet 500 mg     metoclopramide (REGLAN) tablet 10 mg    Or     metoclopramide (REGLAN) injection 10 mg     naloxone (NARCAN) injection 0.1-0.4 mg     No Medication Sleep Aids for this Patient     omeprazole (priLOSEC) CR capsule 40 mg     ondansetron (ZOFRAN-ODT) ODT tab 4 mg    Or     ondansetron (ZOFRAN) injection 4 mg     oxyCODONE (ROXICODONE) tablet 5-10 mg     prochlorperazine (COMPAZINE) injection 10 mg    Or     prochlorperazine (COMPAZINE) tablet 10 mg     senna-docusate (SENOKOT-S/PERICOLACE) 8.6-50 MG per tablet 1 tablet    Or     senna-docusate (SENOKOT-S/PERICOLACE) 8.6-50 MG per tablet 2 tablet     sertraline (ZOLOFT) tablet 150 mg     simethicone (MYLICON) chewable tablet 80 mg     sodium chloride (PF) 0.9% PF flush 3 mL     sodium chloride 0.9% infusion     SUMAtriptan (IMITREX) tablet 25 mg     topiramate (TOPAMAX) tablet 25 mg     traZODone (DESYREL) tablet 150 mg             Physical Exam:   Vitals were reviewed  All vitals stable  Temp: 98.1  F (36.7  C) Temp src: Oral BP: 138/83 Pulse: 55   Resp: 16 SpO2: 97 % O2 Device: None (Room air)    Wound clean and dry with minimal or no drainage.  Surrounding skin with minimal erythema.          Data:     All laboratory data related to this surgery reviewed  Hemoglobin   Date Value Ref Range Status   09/24/2020 9.2 (L) 11.7 - 15.7 g/dL Final   09/11/2020 10.5 (L) 11.7 - 15.7 g/dL Final   08/19/2020 11.8 11.7 - 15.7 g/dL Final   07/11/2020 9.3 (L) 11.7 - 15.7 g/dL Final    06/29/2020 8.9 (L) 11.7 - 15.7 g/dL Final     Comment:     Results confirmed by repeat test     No imaging studies have been ordered    Panchito Narvaez MD

## 2020-09-25 NOTE — PLAN OF CARE
VSS. Denies pain. Tylenol and ibuprofen given as scheduled. No new drainage from incision, abdominal pad in place. No BM overnight, but passing gas. Tolerating regular diet and drinking well. Up and about independently. Continue to monitor.

## 2020-09-25 NOTE — PLAN OF CARE
Tolerating regular diet, up ad maribel walking in [place]. Voiding independently. Passing flatus, no BM as of yet. Senna and milk of mag given tonight. Abdominal pad place on incision for scant amount of drainage from incision. Pain well controlled with scheduled Tylenol and ibuprofen. Continue to encourage activity as tolerated.

## 2020-10-01 ENCOUNTER — TELEPHONE (OUTPATIENT)
Dept: OBGYN | Facility: CLINIC | Age: 50
End: 2020-10-01

## 2020-10-01 NOTE — TELEPHONE ENCOUNTER
Patient calling:  Surgery 9/23/20.  When walking, sweating lower incision area from abdominal overhang. Steri strips are coming off, only has a few left on end.  No fever, no drainage, but there is an odor from sweat.  Feeling great. Minimal pain. Taking advil and tylenol as directed and starting to wean off.  She will send picture of incision.  Madonna Hawthorne RN

## 2020-10-08 ENCOUNTER — OFFICE VISIT (OUTPATIENT)
Dept: OBGYN | Facility: CLINIC | Age: 50
End: 2020-10-08
Payer: COMMERCIAL

## 2020-10-08 VITALS
DIASTOLIC BLOOD PRESSURE: 80 MMHG | HEIGHT: 67 IN | WEIGHT: 293 LBS | BODY MASS INDEX: 45.99 KG/M2 | SYSTOLIC BLOOD PRESSURE: 130 MMHG

## 2020-10-08 DIAGNOSIS — Z90.710 S/P ABDOMINAL HYSTERECTOMY: Primary | ICD-10-CM

## 2020-10-08 PROCEDURE — 99024 POSTOP FOLLOW-UP VISIT: CPT | Performed by: OBSTETRICS & GYNECOLOGY

## 2020-10-08 ASSESSMENT — MIFFLIN-ST. JEOR: SCORE: 2077.85

## 2020-10-08 NOTE — PROGRESS NOTES
"Two week postop after total abdominal hysterectomy, bilateral salpingectomy.  Sore, but improving. No narcotic pain meds used. Now off ibuprofen and tylenol as well.  Incision healing well.  Bladder: feels function is improved after surgery.  Bowel movements has always had difficulty, but using Miralax and this is helping a lot.    Exam:  /80 (BP Location: Right arm, Patient Position: Chair, Cuff Size: Adult Large)   Ht 1.702 m (5' 7\")   Wt 141.5 kg (312 lb)   LMP 09/05/2020   Breastfeeding No   BMI 48.87 kg/m    General: appears well.  Abdomen: soft, nontender.  Incision: clean, dry, and intact     A: status post total abdominal hysterectomy bilateral salpingectomy at 2 weeks postop.    Plan: RTC for 6 week check. Doing well. Discussed restrictions, may increase walking and light hand weights (5 lb each max). She is anxious to begin doing some exercise as she would like to resume her efforts at weight loss.    Lidya Jain MD    "

## 2020-10-08 NOTE — NURSING NOTE
"Chief Complaint   Patient presents with     Post-op Visit      mahad       Initial /80 (BP Location: Right arm, Patient Position: Chair, Cuff Size: Adult Large)   Ht 1.702 m (5' 7\")   Wt 141.5 kg (312 lb)   LMP 2020   Breastfeeding No   BMI 48.87 kg/m   Estimated body mass index is 48.87 kg/m  as calculated from the following:    Height as of this encounter: 1.702 m (5' 7\").    Weight as of this encounter: 141.5 kg (312 lb).  BP completed using cuff size: large    Questioned patient about current smoking habits.  Pt. has never smoked.          The following HM Due: NONE    Kayli Main CMA    "

## 2020-10-23 ENCOUNTER — VIRTUAL VISIT (OUTPATIENT)
Dept: ENDOCRINOLOGY | Facility: CLINIC | Age: 50
End: 2020-10-23
Payer: COMMERCIAL

## 2020-10-23 DIAGNOSIS — E55.9 VITAMIN D DEFICIENCY: ICD-10-CM

## 2020-10-23 DIAGNOSIS — E66.01 CLASS 3 SEVERE OBESITY DUE TO EXCESS CALORIES WITH SERIOUS COMORBIDITY AND BODY MASS INDEX (BMI) OF 45.0 TO 49.9 IN ADULT (H): Primary | ICD-10-CM

## 2020-10-23 DIAGNOSIS — E66.813 CLASS 3 SEVERE OBESITY DUE TO EXCESS CALORIES WITH SERIOUS COMORBIDITY AND BODY MASS INDEX (BMI) OF 45.0 TO 49.9 IN ADULT (H): Primary | ICD-10-CM

## 2020-10-23 DIAGNOSIS — R73.03 PREDIABETES: ICD-10-CM

## 2020-10-23 PROCEDURE — 99214 OFFICE O/P EST MOD 30 MIN: CPT | Mod: 95 | Performed by: CLINICAL NURSE SPECIALIST

## 2020-10-23 RX ORDER — TOPIRAMATE 25 MG/1
TABLET, FILM COATED ORAL
Qty: 360 TABLET | Refills: 1 | Status: SHIPPED | OUTPATIENT
Start: 2020-10-23 | End: 2021-06-10

## 2020-10-23 RX ORDER — PHENTERMINE HYDROCHLORIDE 37.5 MG/1
37.5 TABLET ORAL
Qty: 32 TABLET | Refills: 3 | Status: SHIPPED | OUTPATIENT
Start: 2020-10-23 | End: 2021-06-10

## 2020-10-23 NOTE — PROGRESS NOTES
"    Keerthi Sloan is a 50 year old female who is being evaluated via a billable video visit.      The patient has been notified of following:     \"This video visit will be conducted via a call between you and your physician/provider. We have found that certain health care needs can be provided without the need for an in-person physical exam.  This service lets us provide the care you need with a video conversation.  If a prescription is necessary we can send it directly to your pharmacy.  If lab work is needed we can place an order for that and you can then stop by our lab to have the test done at a later time.    Video visits are billed at different rates depending on your insurance coverage.  Please reach out to your insurance provider with any questions.    If during the course of the call the physician/provider feels a video visit is not appropriate, you will not be charged for this service.\"    Patient has given verbal consent for Video visit? Yes  How would you like to obtain your AVS? MyChart  If you are dropped from the video visit, the video invite should be resent to: Text to cell phone: [phone]  Will anyone else be joining your video visit? No        Video-Visit Details    Type of service:  Video Visit    Video Start Time: 8:42 AM  Video End Time: 8:56 AM    Originating Location (pt. Location): Home    Distant Location (provider location):  [facility]     Platform used for Video Visit: Marvin    Name: Keerthi Sloan  (Last seen 6/24/2020).  HPI:  Keerthi Sloan is a 50 year old female who presents via video visit for obesity, prediabetes, and vitamin D deficiency:      Morbid Obesity - She started gaining weight in her 30's.  Weight stabilized in the low 300's the past 5 years and she was unable to lose any weight with diet efforts.  She considered gastric bypass and was being seen at [facility] in anticipation of doing the procedure but she didn't feel " comfortable doing the bypass so quit going to the clinic.      She was briefly treated with Phentermine 5/2016.  She discontinued this on her own after 1-2 months.  Was able to lose 51 lbs with diet efforts, 305-->288-->254 lbs, since 12/2015,then weight increased to 280 lbs, then over 300 lbs.    Hysterectomy for uterine fibroids 9/23/2020.  Has anemia secondary to the fibroids.  Required some iron infusions pre-surgery.  Now taking prenatal vitamin with iron.  Has upcoming labs to reassess hemoglobin level.    Phentermine 37.5 mg/day and topiramate 25 mg bid resumed 9/2020 - about 6 weeks ago.  She delayed restarting the phentermine until after her hysterectomy.  Weight was 320 lbs when she restarted the phentermine, now 309 lbs.    + prediabetes - Previously treated with Metformin 500 mg bid.  A1c improved 6.1--->5.5%. Has been off metformin for > 1 year.  A1c increased to 5.9% (6/2020).  Restarted Metformin  mg bid 6/2020.  Tolerating Metformin well.  +vitamin D deficiency - D level 15, started vitamin D 50,000 IU weekly.  D improved to 21-22.  Has been off all D replacement for > 1 year. Vitamin D level was low at 16 (6/2020).  Vitamin D 50,000 units weekly was restarted at that time.  Sleep Apnea/Snores:Yes: diagnosed with sleep apnea, uses CPAP  Hyperlipidemia:Yes: elevated triglycerides  Hirsutism:Yes: increased facial hair growth (chin)  Family history of Obesity:Yes: Mother had gastric bypass  Diet: yes, resumed diet efforts.  Exercise: yes, plans to resume.   Other: elevated 11pm salivary cortisol.  1 mg Dexamethasone suppression test was normal.      PMH/PSH:  Past Medical History:   Diagnosis Date     Anemia      Diabetes (H)     pre diabetes     Fibroids      History of blood transfusion      Migraines      Obesity      Prediabetes      Sleep apnea     Uses a CPAP     Past Surgical History:   Procedure Laterality Date     HERNIA REPAIR      inguinal     HYSTERECTOMY TOTAL ABDOMINAL, SALPINGECTOMY  N/A 9/23/2020    Procedure: TOTAL ABDOMINAL HYSTERECTOMY, BILATERAL SALPINGECTOMY;  Surgeon: Lidya Jain MD;  Location: [facility] OR     KNEE SURGERY Left     left knee partial meniscus repair     TUBAL LIGATION  1997    LBTL     Family Hx:  Family History   Problem Relation Age of Onset     Diabetes Father      Hypertension Father      Kidney Disease Father      Chronic Obstructive Pulmonary Disease Father      Heart Failure Father      Diabetes Brother      Hypertension Brother      Thyroid disease: No         DM2: Yes: father         Autoimmune: DM1, SLE, RA, Vitiligo No    Social Hx:  Social History     Socioeconomic History     Marital status:      Spouse name: Not on file     Number of children: 3     Years of education: Not on file     Highest education level: Not on file   Occupational History     Occupation:      Employer: [employer]   Social Needs     Financial resource strain: Not on file     Food insecurity     Worry: Not on file     Inability: Not on file     Transportation needs     Medical: Not on file     Non-medical: Not on file   Tobacco Use     Smoking status: Never Smoker     Smokeless tobacco: Never Used   Substance and Sexual Activity     Alcohol use: No     Alcohol/week: 0.0 standard drinks     Drug use: No     Sexual activity: Not Currently   Lifestyle     Physical activity     Days per week: Not on file     Minutes per session: Not on file     Stress: Not on file   Relationships     Social connections     Talks on phone: Not on file     Gets together: Not on file     Attends Zoroastrian service: Not on file     Active member of club or organization: Not on file     Attends meetings of clubs or organizations: Not on file     Relationship status: Not on file     Intimate partner violence     Fear of current or ex partner: Not on file     Emotionally abused: Not on file     Physically abused: Not on file     Forced sexual activity: Not on file   Other Topics Concern      Parent/sibling w/ CABG, MI or angioplasty before 65F 55M? Not Asked   Social History Narrative     Not on file          MEDICATIONS:  has a current medication list which includes the following prescription(s): acetaminophen, vitamin d3, ibuprofen, metformin, methocarbamol, omeprazole, phentermine, prenatal mv-min-fe fum-fa-dha, sertraline, sumatriptan, topiramate, and trazodone.    ROS   ROS: 10 point ROS neg other than the symptoms noted above in the HPI.      Physical Exam   VS: LMP 09/05/2020   GENERAL: NAD, well dressed, answering questions appropriately, appears stated age.  HEENT: no exopthalmous, no proptosis, no lig lag, no retraction, no scleral icterus  RESPIRATORY: Normal respiratory effort.  CARDIOVASCULAR:   EXTREMITIES:   NEUROLOGY:   MSK:   PSYCH: Intact judgment and insight. A&OX3 with a cordial affect.    LABS:  !COMPREHENSIVE Latest Ref Rng & Units 7/11/2020 9/24/2020   SODIUM 133 - 144 mmol/L 138    POTASSIUM 3.4 - 5.3 mmol/L 3.7    CHLORIDE 94 - 109 mmol/L 106    BUN 7 - 30 mg/dL 12    Creatinine 0.52 - 1.04 mg/dL 0.64 0.65   Glucose 70 - 99 mg/dL 101 (H) 110 (H)   ANION GAP 3 - 14 mmol/L 6    CALCIUM 8.5 - 10.1 mg/dL 8.5      Component      Latest Ref Rng & Units 12/6/2017 8/8/2018 6/29/2020   Hemoglobin A1C      0 - 5.6 % 5.7 5.7 (H) 5.9 (H)     Component    Latest Ref Rng 5/26/2016   Saliva Collection Time     2300   Cortisol Saliva     0.129     1 mg Dexamethasone suppression test:  Component    Latest Ref Rng 6/17/2016   Cortisol Serum    4 - 22 ug/dL 0.8 (L)     Component    Latest Ref Rng 5/26/2016   Testosterone Total    8 - 60 ng/dL 4 (L)   Sex Hormone Binding Globulin    30 - 135 nmol/L 34   Free Testosterone Calculated    0.11 - 0.58 ng/dL 0.07 (L)   Estradiol     26   FSH     5.8   Lutropin     2.7     Component      Latest Ref Rng & Units 1/23/2017 12/6/2017 8/8/2018 6/29/2020   Vitamin D Deficiency screening      20 - 75 ug/L 21 14 (L) 17 (L) 16 (L)         All pertinent notes,  labs, and images personally reviewed by me.     A/P  Ms.Kimberly CAROLYN Sloan is a 50 year old seen via video visit for management of:    1. IFG/prediabetes.  History of IFG and elevated A1c + FH of type 2 diabetes.  No personal history of GD (3 pregnancies).  A1c elevated at 6.1%, improved to 5.5% on metformin.  Initially started Metformin  mg bid 6/2016, had been off metformin for > 1 year.   Metformin 500 mg bid resumed 6/2020.  Ok to take metformin 1000 mg once daily if more convenient and is tolerated.  Plan to repeat A1c with labs in December.    2. Morbid Obesity with serious comorbidity.  BMI 50.2-->48.5.  Obesity-related comorbidity -  IFG/prediabetes and MARÍA.      Obesity is associated with a significant increase in mortality and risk of many disorders, including diabetes mellitus, hypertension, dyslipidemia, heart disease, stroke, sleep apnea, cancer, and many others. Conversely, weight loss is associated with a reduction in obesity-associated morbidity.    Endocrine evaluation of obesity includes Diabetes/prediabetes, Cushing's and thyroid dysfunction.  The relevant work up for the diagnosis and management of obesity and various treatment options were discussed. Body Mass Index (BMI) has been a standard means for calculating risk for overweight and obesity. The new American Association of Clinical Endocrinology (AACE) algorithm recommends lifestyle modifications as the initial phase of treatment for all patients with the BMI equal or greater than 25 kg/m2. Lifestyle modifications includes use of medical nutrition therapy, exercise, tobacco cessation, adequate quality and quantity of sleep, limited consumption of alcohol and reduced stress through implementation of a structured, multidisciplinary program.    In patients with complications associated with obesity, graded interventions are recommended including pharmacological therapy such as phentermine, orlistat, lorcaserin and phentermine/topiramate  ER, contrave ( buproprion/naltreone) and the use of very low calorie meal replacement programs.    If medical intervention is insufficient, surgical therapy may be considered, especially in patients with BMI greater than or equal to 35 kg/m2 with multiple complications. Surgical treatments include lap-band, gastric sleeve or gastric bypass surgery.    She was previously considering surgical weight loss options.  Interested in transoral gastroplasty - procedure is done at [place].  Counseling exercise ( V65.41)  Dietary counseling( V65.3)  Calculated BMI above the upper parameter and f/u plan was documented in the medical record.  Discussed indications, risks and benefits of all medications prescribed, and answered questions to patient's satisfaction.  Restarted phentermine 37.5 mg every day and  Topamax to 25 mg bid 9/2020.  Has lost 11 lbs (320-->309 lbs) since restarting phenermine and topamax.  Tolerating both well.  Continue current dose of phentermine and topamax.  Continue diet and exercise efforts.    3. Vitamin D deficiency.  Vitamin D level previously low at 15, improved to 21.  Started Vitamin D 50,000 IU weekly 6/2016. Off D replacement for > 1 year.     Resumed vitamin D 50,000 units weekly 6/2020.    Plan to continue D replacement and recheck vitamin D level in December.    Labs ordered today:   No orders of the defined types were placed in this encounter.    Radiology/Consults ordered today: None      Follow-up:  3-4 months with Dr. Loera.    Janet Castaneda NP  Endocrinology  [facility]  CC:    ____________________________________________________________

## 2020-10-23 NOTE — LETTER
"    10/23/2020         RE: Keerthi Sloan  [address]  [facility] [address]        Dear Colleague,    Thank you for referring your patient, Keerthi Sloan, to the [facility]. Please see a copy of my visit note below.        Keerthi Sloan is a 50 year old female who is being evaluated via a billable video visit.      The patient has been notified of following:     \"This video visit will be conducted via a call between you and your physician/provider. We have found that certain health care needs can be provided without the need for an in-person physical exam.  This service lets us provide the care you need with a video conversation.  If a prescription is necessary we can send it directly to your pharmacy.  If lab work is needed we can place an order for that and you can then stop by our lab to have the test done at a later time.    Video visits are billed at different rates depending on your insurance coverage.  Please reach out to your insurance provider with any questions.    If during the course of the call the physician/provider feels a video visit is not appropriate, you will not be charged for this service.\"    Patient has given verbal consent for Video visit? Yes  How would you like to obtain your AVS? MyChart  If you are dropped from the video visit, the video invite should be resent to: Text to cell phone: [phone]  Will anyone else be joining your video visit? No        Video-Visit Details    Type of service:  Video Visit    Video Start Time: 8:42 AM  Video End Time: 8:56 AM    Originating Location (pt. Location): Home    Distant Location (provider location):  [facility]     Platform used for Video Visit: Doximity    Name: Keerthi Sloan  (Last seen 6/24/2020).  HPI:  Keerthi Sloan is a 50 year old female who presents via video visit for obesity, prediabetes, and vitamin D deficiency:      Morbid " Obesity - She started gaining weight in her 30's.  Weight stabilized in the low 300's the past 5 years and she was unable to lose any weight with diet efforts.  She considered gastric bypass and was [facility] in [facility] of doing the procedure but she didn't feel comfortable doing the bypass so quit going to the clinic.      She was briefly treated with Phentermine 5/2016.  She discontinued this on her own after 1-2 months.  Was able to lose 51 lbs with diet efforts, 305-->288-->254 lbs, since 12/2015,then weight increased to 280 lbs, then over 300 lbs.    Hysterectomy for uterine fibroids 9/23/2020.  Has anemia secondary to the fibroids.  Required some iron infusions pre-surgery.  Now taking prenatal vitamin with iron.  Has upcoming labs to reassess hemoglobin level.    Phentermine 37.5 mg/day and topiramate 25 mg bid resumed 9/2020 - about 6 weeks ago.  She delayed restarting the phentermine until after her hysterectomy.  Weight was 320 lbs when she restarted the phentermine, now 309 lbs.    + prediabetes - Previously treated with Metformin 500 mg bid.  A1c improved 6.1--->5.5%. Has been off metformin for > 1 year.  A1c increased to 5.9% (6/2020).  Restarted Metformin  mg bid 6/2020.  Tolerating Metformin well.  +vitamin D deficiency - D level 15, started vitamin D 50,000 IU weekly.  D improved to 21-22.  Has been off all D replacement for > 1 year. Vitamin D level was low at 16 (6/2020).  Vitamin D 50,000 units weekly was restarted at that time.  Sleep Apnea/Snores:Yes: diagnosed with sleep apnea, uses CPAP  Hyperlipidemia:Yes: elevated triglycerides  Hirsutism:Yes: increased facial hair growth (chin)  Family history of Obesity:Yes: Mother had gastric bypass  Diet: yes, resumed diet efforts.  Exercise: yes, plans to resume.   Other: elevated 11pm salivary cortisol.  1 mg Dexamethasone suppression test was normal.      PMH/PSH:  Past Medical History:   Diagnosis Date     Anemia      Diabetes (H)      pre diabetes     Fibroids      History of blood transfusion      Migraines      Obesity      Prediabetes      Sleep apnea     Uses a CPAP     Past Surgical History:   Procedure Laterality Date     HERNIA REPAIR      inguinal     HYSTERECTOMY TOTAL ABDOMINAL, SALPINGECTOMY N/A 9/23/2020    Procedure: TOTAL ABDOMINAL HYSTERECTOMY, BILATERAL SALPINGECTOMY;  Surgeon: Lidya Jain MD;  Location: [facility] OR     KNEE SURGERY Left     left knee partial meniscus repair     TUBAL LIGATION  1997    LBTL     Family Hx:  Family History   Problem Relation Age of Onset     Diabetes Father      Hypertension Father      Kidney Disease Father      Chronic Obstructive Pulmonary Disease Father      Heart Failure Father      Diabetes Brother      Hypertension Brother      Thyroid disease: No         DM2: Yes: father         Autoimmune: DM1, SLE, RA, Vitiligo No    Social Hx:  Social History     Socioeconomic History     Marital status:      Spouse name: Not on file     Number of children: 3     Years of education: Not on file     Highest education level: Not on file   Occupational History     Occupation:      Employer: [email or web address]   Social Needs     Financial resource strain: Not on file     Food insecurity     Worry: Not on file     Inability: Not on file     Transportation needs     Medical: Not on file     Non-medical: Not on file   Tobacco Use     Smoking status: Never Smoker     Smokeless tobacco: Never Used   Substance and Sexual Activity     Alcohol use: No     Alcohol/week: 0.0 standard drinks     Drug use: No     Sexual activity: Not Currently   Lifestyle     Physical activity     Days per week: Not on file     Minutes per session: Not on file     Stress: Not on file   Relationships     Social connections     Talks on phone: Not on file     Gets together: Not on file     Attends Restorationist service: Not on file     Active member of club or organization: Not on file     Attends meetings of clubs  or organizations: Not on file     Relationship status: Not on file     Intimate partner violence     Fear of current or ex partner: Not on file     Emotionally abused: Not on file     Physically abused: Not on file     Forced sexual activity: Not on file   Other Topics Concern     Parent/sibling w/ CABG, MI or angioplasty before 65F 55M? Not Asked   Social History Narrative     Not on file          MEDICATIONS:  has a current medication list which includes the following prescription(s): acetaminophen, vitamin d3, ibuprofen, metformin, methocarbamol, omeprazole, phentermine, prenatal mv-min-fe fum-fa-dha, sertraline, sumatriptan, topiramate, and trazodone.    ROS   ROS: 10 point ROS neg other than the symptoms noted above in the HPI.      Physical Exam   VS: LMP 09/05/2020   GENERAL: NAD, well dressed, answering questions appropriately, appears stated age.  HEENT: no exopthalmous, no proptosis, no lig lag, no retraction, no scleral icterus  RESPIRATORY: Normal respiratory effort.  CARDIOVASCULAR:   EXTREMITIES:   NEUROLOGY:   MSK:   PSYCH: Intact judgment and insight. A&OX3 with a cordial affect.    LABS:  !COMPREHENSIVE Latest Ref Rng & Units 7/11/2020 9/24/2020   SODIUM 133 - 144 mmol/L 138    POTASSIUM 3.4 - 5.3 mmol/L 3.7    CHLORIDE 94 - 109 mmol/L 106    BUN 7 - 30 mg/dL 12    Creatinine 0.52 - 1.04 mg/dL 0.64 0.65   Glucose 70 - 99 mg/dL 101 (H) 110 (H)   ANION GAP 3 - 14 mmol/L 6    CALCIUM 8.5 - 10.1 mg/dL 8.5      Component      Latest Ref Rng & Units 12/6/2017 8/8/2018 6/29/2020   Hemoglobin A1C      0 - 5.6 % 5.7 5.7 (H) 5.9 (H)     Component    Latest Ref Rng 5/26/2016   Saliva Collection Time     2300   Cortisol Saliva     0.129     1 mg Dexamethasone suppression test:  Component    Latest Ref Rng 6/17/2016   Cortisol Serum    4 - 22 ug/dL 0.8 (L)     Component    Latest Ref Rng 5/26/2016   Testosterone Total    8 - 60 ng/dL 4 (L)   Sex Hormone Binding Globulin    30 - 135 nmol/L 34   Free Testosterone  Calculated    0.11 - 0.58 ng/dL 0.07 (L)   Estradiol     26   FSH     5.8   Lutropin     2.7     Component      Latest Ref Rng & Units 1/23/2017 12/6/2017 8/8/2018 6/29/2020   Vitamin D Deficiency screening      20 - 75 ug/L 21 14 (L) 17 (L) 16 (L)         All pertinent notes, labs, and images personally reviewed by me.     A/P  Ms.Kimberly CAROLYN Sloan is a 50 year old seen via video visit for management of:    1. IFG/prediabetes.  History of IFG and elevated A1c + FH of type 2 diabetes.  No personal history of GD (3 pregnancies).  A1c elevated at 6.1%, improved to 5.5% on metformin.  Initially started Metformin  mg bid 6/2016, had been off metformin for > 1 year.   Metformin 500 mg bid resumed 6/2020.  Ok to take metformin 1000 mg once daily if more convenient and is tolerated.  Plan to repeat A1c with labs in December.    2. Morbid Obesity with serious comorbidity.  BMI 50.2-->48.5.  Obesity-related comorbidity -  IFG/prediabetes and MARÍA.      Obesity is associated with a significant increase in mortality and risk of many disorders, including diabetes mellitus, hypertension, dyslipidemia, heart disease, stroke, sleep apnea, cancer, and many others. Conversely, weight loss is associated with a reduction in obesity-associated morbidity.    Endocrine evaluation of obesity includes Diabetes/prediabetes, Cushing's and thyroid dysfunction.  The relevant work up for the diagnosis and management of obesity and various treatment options were discussed. Body Mass Index (BMI) has been a standard means for calculating risk for overweight and obesity. The new American Association of Clinical Endocrinology (AACE) algorithm recommends lifestyle modifications as the initial phase of treatment for all patients with the BMI equal or greater than 25 kg/m2. Lifestyle modifications includes use of medical nutrition therapy, exercise, tobacco cessation, adequate quality and quantity of sleep, limited consumption of alcohol and  reduced stress through implementation of a structured, multidisciplinary program.    In patients with complications associated with obesity, graded interventions are recommended including pharmacological therapy such as phentermine, orlistat, lorcaserin and phentermine/topiramate ER, contrave ( buproprion/naltreone) and the use of very low calorie meal replacement programs.    If medical intervention is insufficient, surgical therapy may be considered, especially in patients with BMI greater than or equal to 35 kg/m2 with multiple complications. Surgical treatments include lap-band, gastric sleeve or gastric bypass surgery.    She was previously considering surgical weight loss options.  Interested in transoral gastroplasty - procedure is done at [facility].  Counseling exercise ( V65.41)  Dietary counseling( V65.3)  Calculated BMI above the upper parameter and f/u plan was documented in the medical record.  Discussed indications, risks and benefits of all medications prescribed, and answered questions to patient's satisfaction.  Restarted phentermine 37.5 mg every day and  Topamax to 25 mg bid 9/2020.  Has lost 11 lbs (320-->309 lbs) since restarting phenermine and topamax.  Tolerating both well.  Continue current dose of phentermine and topamax.  Continue diet and exercise efforts.    3. Vitamin D deficiency.  Vitamin D level previously low at 15, improved to 21.  Started Vitamin D 50,000 IU weekly 6/2016. Off D replacement for > 1 year.     Resumed vitamin D 50,000 units weekly 6/2020.    Plan to continue D replacement and recheck vitamin D level in December.    Labs ordered today:   No orders of the defined types were placed in this encounter.    Radiology/Consults ordered today: None      Follow-up:  3-4 months with Dr. Loera.    Janet Castaneda NP  Endocrinology  [facility]  CC:    ____________________________________________________________      Again, thank you for allowing me to participate in  the care of your patient.        Sincerely,        MADELEINE Vasquez CNP

## 2020-11-05 ENCOUNTER — OFFICE VISIT (OUTPATIENT)
Dept: OBGYN | Facility: CLINIC | Age: 50
End: 2020-11-05
Payer: COMMERCIAL

## 2020-11-05 VITALS
HEIGHT: 67 IN | SYSTOLIC BLOOD PRESSURE: 116 MMHG | DIASTOLIC BLOOD PRESSURE: 78 MMHG | WEIGHT: 293 LBS | BODY MASS INDEX: 45.99 KG/M2

## 2020-11-05 DIAGNOSIS — D50.0 IRON DEFICIENCY ANEMIA DUE TO CHRONIC BLOOD LOSS: Primary | ICD-10-CM

## 2020-11-05 DIAGNOSIS — Z90.710 S/P ABDOMINAL HYSTERECTOMY: ICD-10-CM

## 2020-11-05 LAB
BASOPHILS # BLD AUTO: 0 10E9/L (ref 0–0.2)
BASOPHILS NFR BLD AUTO: 0.7 %
DIFFERENTIAL METHOD BLD: ABNORMAL
EOSINOPHIL # BLD AUTO: 0.1 10E9/L (ref 0–0.7)
EOSINOPHIL NFR BLD AUTO: 2.5 %
ERYTHROCYTE [DISTWIDTH] IN BLOOD BY AUTOMATED COUNT: 17.3 % (ref 10–15)
HCT VFR BLD AUTO: 39.1 % (ref 35–47)
HGB BLD-MCNC: 11.4 G/DL (ref 11.7–15.7)
LYMPHOCYTES # BLD AUTO: 2.4 10E9/L (ref 0.8–5.3)
LYMPHOCYTES NFR BLD AUTO: 42.4 %
MCH RBC QN AUTO: 20.7 PG (ref 26.5–33)
MCHC RBC AUTO-ENTMCNC: 29.2 G/DL (ref 31.5–36.5)
MCV RBC AUTO: 71 FL (ref 78–100)
MONOCYTES # BLD AUTO: 0.6 10E9/L (ref 0–1.3)
MONOCYTES NFR BLD AUTO: 10.6 %
NEUTROPHILS # BLD AUTO: 2.4 10E9/L (ref 1.6–8.3)
NEUTROPHILS NFR BLD AUTO: 43.8 %
PLATELET # BLD AUTO: 276 10E9/L (ref 150–450)
RBC # BLD AUTO: 5.5 10E12/L (ref 3.8–5.2)
WBC # BLD AUTO: 5.6 10E9/L (ref 4–11)

## 2020-11-05 PROCEDURE — 99024 POSTOP FOLLOW-UP VISIT: CPT | Performed by: OBSTETRICS & GYNECOLOGY

## 2020-11-05 PROCEDURE — 85025 COMPLETE CBC W/AUTO DIFF WBC: CPT | Performed by: OBSTETRICS & GYNECOLOGY

## 2020-11-05 PROCEDURE — 82728 ASSAY OF FERRITIN: CPT | Performed by: OBSTETRICS & GYNECOLOGY

## 2020-11-05 PROCEDURE — 36415 COLL VENOUS BLD VENIPUNCTURE: CPT | Performed by: OBSTETRICS & GYNECOLOGY

## 2020-11-05 ASSESSMENT — PATIENT HEALTH QUESTIONNAIRE - PHQ9: SUM OF ALL RESPONSES TO PHQ QUESTIONS 1-9: 2

## 2020-11-05 ASSESSMENT — MIFFLIN-ST. JEOR: SCORE: 2064.24

## 2020-11-05 NOTE — NURSING NOTE
"Chief Complaint   Patient presents with     Post-op Visit      NUNO GIRON       Initial /78 (BP Location: Right arm, Patient Position: Chair, Cuff Size: Adult Large)   Ht 1.702 m (5' 7\")   Wt 140.2 kg (309 lb)   LMP 2020   Breastfeeding No   BMI 48.40 kg/m   Estimated body mass index is 48.4 kg/m  as calculated from the following:    Height as of this encounter: 1.702 m (5' 7\").    Weight as of this encounter: 140.2 kg (309 lb).  BP completed using cuff size: large    Questioned patient about current smoking habits.  Pt. has never smoked.          The following HM Due: NONE  Kayli Main CMA    "

## 2020-11-05 NOTE — PROGRESS NOTES
"  SUBJECTIVE:                                                   Keerthi Solan is a 50 year old female who presents to clinic today for the following health issue(s):  Postoperative visit    HPI:  She is status post Total Abdominal Hysterectomy 6 weeks ago. Reports no bleeding, no fevers, chills, or other concerns.     Pathology:   Copath Report   Date Value Ref Range Status   09/23/2020   Final    Patient Name: KEERTHI SLOAN  MR#: [id number]  Specimen #: [id number]  Collected: 9/23/2020  Received: 9/23/2020  Reported: 9/24/2020 12:32  Ordering Phy(s): LAURA BROWN    For improved result formatting, select 'View Enhanced Report Format' under   Linked Documents section.    SPECIMEN(S):  Uterus, cervix, bilateral fallopian tube segments    FINAL DIAGNOSIS:  Uterus, resection:  - Cervix with endocervical polyps, nabothian cysts and chronic cervicitis.    Negative for dysplasia.  - Secretory to inactive endometrium.  - Adenomyosis.  - Leiomyomas, including large leiomyoma with hydropic degeneration.  - Unremarkable serosa.  - Negative for endometrial polyp, hyperplasia and malignancy.    Fallopian tubes, bilateral, resection:  - Paratubal cyst.  - Hydrosalpinx (unilateral).  - Negative for preneoplastic and neoplastic processes.    Electronically signed out by:    Je Juarez M.D.    CLINICAL HISTORY:  Fibroid uterus.  Menorrhagia.    GROSS:  The specimen is received in formalin labeled with the patient's name,   identifying information and designated  \"uterus, cervix, bilateral fallopian tube segments\".  It consists of an   898 g hysterectomy specimen, measuring  17 x 15 x 10.5 cm.  Located near the left fundus is an 8 cm cavity   corresponding to a morcellated fragments of  coarsely trabeculated myometrium.  The fragments are found loose within   the container and aggregate to 216 g,  and 12 x 9 x 4.5 cm.  The uterine serosa is pink and smooth.  The   ectocervical mucosa is pink, smooth with " a  1.5 cm centrally located, slitlike os.  Bivalving the specimen reveals a   pink corrugated endocervical canal  with two associated polyps, each measuring 0.6 cm (posterior aspect).  The   lower uterine region appears  stenotic.  The endometrium is pink to hemorrhagic and up to 0.2 cm thick.    No endometrial masses or polyps are  identified. The remaining myometrium is coarsely trabeculated with two   nodules, measuring 0.7 cm and 5 cm,  respectively.  Also within the container are two fimbriated fallopian   tubes, one intact (2.5 x 0.5 cm), and  one in two pieces (3 x 1 cm upon reapproximation).  Both fallopian tubes   are surfaced by pink-purple, smooth  and edematous serosa.  Representatively submitted.    Summary of Sections:  A1 - anterior cervix  A2 - posterior cervix with associated polyps.  A3-A4 - anterior endomyometrium.  A5-A6 - posterior endomyometrium with smaller nodule.  A7-A9 - larger nodule.  A10 - fragments of loose coarsely trabeculated myometrium.  A11 - fallopian tube with fimbria.  A12 - other fallopian tube with fimbria. (Dictated by: MONIQUE Baez   9/23/2020 12:53 PM)    MICROSCOPIC:  Microscopic examination was performed.    The technical component of this testing was completed at the [facility], with the professional component performed   at the [facility], [address], [place]  [address]   ([phone])    CPT Codes:  A: 14619-YA7    COLLECTION SITE:  Client: [facility]  Location: KENDRA MALIK)             Problem list and histories reviewed & adjusted, as indicated.  Additional history: as documented.    Patient Active Problem List   Diagnosis     Grief     Binge eating     Dysmenorrhea     Morbid obesity (H)     History of biliary T-tube placement     Prediabetes     Vitamin D deficiency     Anemia, iron deficiency     Obstructive sleep apnea syndrome     Stress  incontinence in female     Uterine leiomyoma     BMI 40.0-44.9, adult (H)     PTSD (post-traumatic stress disorder)     Adjustment disorder with mixed anxiety and depressed mood     Adjustment insomnia     Iron deficiency anemia due to chronic blood loss     Intramural and submucous leiomyoma of uterus     Fibroid uterus     Menorrhagia     S/P BREE (total abdominal hysterectomy)     Past Surgical History:   Procedure Laterality Date     HERNIA REPAIR      inguinal     HYSTERECTOMY TOTAL ABDOMINAL, SALPINGECTOMY N/A 9/23/2020    Procedure: TOTAL ABDOMINAL HYSTERECTOMY, BILATERAL SALPINGECTOMY;  Surgeon: Lidya Jain MD;  Location: [facility] OR     KNEE SURGERY Left     left knee partial meniscus repair     TUBAL LIGATION  1997    LBTL      Social History     Tobacco Use     Smoking status: Never Smoker     Smokeless tobacco: Never Used   Substance Use Topics     Alcohol use: No     Alcohol/week: 0.0 standard drinks      Problem (# of Occurrences) Relation (Name,Age of Onset)    Chronic Obstructive Pulmonary Disease (1) Father    Diabetes (2) Father, Brother    Heart Failure (1) Father    Hypertension (2) Father, Brother    Kidney Disease (1) Father                 omeprazole (PRILOSEC) 40 MG capsule, Take 40 mg by mouth daily as needed        Prenatal MV-Min-Fe Fum-FA-DHA (PRENATAL 1 PO),        topiramate (TOPAMAX) 25 MG tablet, 1-2 tabs bid       traZODone (DESYREL) 150 MG tablet, Take 1 tablet (150 mg) by mouth nightly as needed for sleep       vitamin D3 (CHOLECALCIFEROL) 1.25 MG (02278 UT) capsule, Take 1 capsule (50,000 Units) by mouth once a week       acetaminophen (TYLENOL) 325 MG tablet, Take 2 tablets (650 mg) by mouth every 4 hours as needed for other (multimodal surgical pain management along with NSAIDS and opioid medication as indicated based on pain control and physical function.) (Patient not taking: Reported on 10/8/2020)       ibuprofen (ADVIL/MOTRIN) 200 MG tablet, Take 3 tablets (600 mg) by mouth  "every 6 hours as needed for moderate pain (Patient not taking: Reported on 10/8/2020)       metFORMIN (GLUCOPHAGE) 500 MG tablet, Take 1 tablet (500 mg) by mouth 2 times daily (with meals) (Patient not taking: Reported on 11/5/2020)       methocarbamol (ROBAXIN) 500 MG tablet, 1-2 tabs q TID PRN for muscle spasm/pain (Patient not taking: Reported on 10/8/2020)       phentermine (ADIPEX-P) 37.5 MG tablet, Take 1 tablet (37.5 mg) by mouth every morning (before breakfast) (Patient not taking: Reported on 11/5/2020)       sertraline (ZOLOFT) 100 MG tablet, Take 1.5 tablets (150 mg) by mouth daily (Patient not taking: Reported on 11/5/2020)       SUMAtriptan (IMITREX) 25 MG tablet, Take 1 tablet (25 mg) by mouth at onset of headache for migraine May repeat in 2 hours. Max 8 tablets/24 hours. (Patient not taking: Reported on 10/8/2020)    No current facility-administered medications on file prior to visit.     No Known Allergies      OBJECTIVE:     /78 (BP Location: Right arm, Patient Position: Chair, Cuff Size: Adult Large)   Ht 1.702 m (5' 7\")   Wt 140.2 kg (309 lb)   LMP 09/05/2020   Breastfeeding No   BMI 48.40 kg/m     BMI: Body mass index is 48.4 kg/m .  General: Alert and oriented, no distress.  Psychiatric: Mood and affect within normal limits.  Abdomen: soft, nontender. Incision clean, dry, and intact.  Pelvis: External genitalia, Bartholin, urethral, and Chauvin glands within normal limits. On speculum exam, vaginal cuff is well supported, bimanual exam confirms and is negative for mass or tenderness.    In-Clinic Test Results:  Results for orders placed or performed in visit on 11/05/20 (from the past 24 hour(s))   CBC with platelets differential   Result Value Ref Range    WBC 5.6 4.0 - 11.0 10e9/L    RBC Count 5.50 (H) 3.8 - 5.2 10e12/L    Hemoglobin 11.4 (L) 11.7 - 15.7 g/dL    Hematocrit 39.1 35.0 - 47.0 %    MCV 71 (L) 78 - 100 fl    MCH 20.7 (L) 26.5 - 33.0 pg    MCHC 29.2 (L) 31.5 - 36.5 g/dL    " RDW 17.3 (H) 10.0 - 15.0 %    Platelet Count 276 150 - 450 10e9/L    % Neutrophils 43.8 %    % Lymphocytes 42.4 %    % Monocytes 10.6 %    % Eosinophils 2.5 %    % Basophils 0.7 %    Absolute Neutrophil 2.4 1.6 - 8.3 10e9/L    Absolute Lymphocytes 2.4 0.8 - 5.3 10e9/L    Absolute Monocytes 0.6 0.0 - 1.3 10e9/L    Absolute Eosinophils 0.1 0.0 - 0.7 10e9/L    Absolute Basophils 0.0 0.0 - 0.2 10e9/L    Diff Method Automated Method        ASSESSMENT/PLAN:                                                      Status post Total Abdominal Hysterectomy for large uterine fibroids      Doing well, no concerns. No further restrictions are needed--surgically cleared to resume all activities.    Lidya Jain MD  [facility]

## 2020-11-06 DIAGNOSIS — D64.9 ANEMIA: Primary | ICD-10-CM

## 2020-11-06 LAB — FERRITIN SERPL-MCNC: 5 NG/ML (ref 8–252)

## 2021-01-04 DIAGNOSIS — F51.02 ADJUSTMENT INSOMNIA: ICD-10-CM

## 2021-01-04 DIAGNOSIS — F43.10 PTSD (POST-TRAUMATIC STRESS DISORDER): ICD-10-CM

## 2021-01-04 DIAGNOSIS — F43.23 ADJUSTMENT DISORDER WITH MIXED ANXIETY AND DEPRESSED MOOD: ICD-10-CM

## 2021-01-07 RX ORDER — SERTRALINE HYDROCHLORIDE 100 MG/1
150 TABLET, FILM COATED ORAL DAILY
Qty: 135 TABLET | Refills: 0 | Status: SHIPPED | OUTPATIENT
Start: 2021-01-07 | End: 2021-05-25

## 2021-01-07 RX ORDER — TRAZODONE HYDROCHLORIDE 150 MG/1
150 TABLET ORAL
Qty: 90 TABLET | Refills: 0 | Status: SHIPPED | OUTPATIENT
Start: 2021-01-07 | End: 2021-05-25

## 2021-01-07 NOTE — TELEPHONE ENCOUNTER
Routing refill request to provider for review/approval because:  Drug interaction warning - high risk. Needs override.     Precious Worthington RN, BSN, PHN  [facility] [place]

## 2021-01-14 ENCOUNTER — HEALTH MAINTENANCE LETTER (OUTPATIENT)
Age: 51
End: 2021-01-14

## 2021-01-18 ENCOUNTER — OFFICE VISIT (OUTPATIENT)
Dept: FAMILY MEDICINE | Facility: CLINIC | Age: 51
End: 2021-01-18
Payer: COMMERCIAL

## 2021-01-18 VITALS
DIASTOLIC BLOOD PRESSURE: 82 MMHG | SYSTOLIC BLOOD PRESSURE: 128 MMHG | BODY MASS INDEX: 49.49 KG/M2 | HEART RATE: 64 BPM | RESPIRATION RATE: 16 BRPM | WEIGHT: 293 LBS | TEMPERATURE: 97.8 F

## 2021-01-18 DIAGNOSIS — L02.214 ABSCESS OF LEFT GROIN: Primary | ICD-10-CM

## 2021-01-18 PROCEDURE — 99213 OFFICE O/P EST LOW 20 MIN: CPT | Performed by: PHYSICIAN ASSISTANT

## 2021-01-18 RX ORDER — SULFAMETHOXAZOLE/TRIMETHOPRIM 800-160 MG
1 TABLET ORAL 2 TIMES DAILY
Qty: 14 TABLET | Refills: 0 | Status: SHIPPED | OUTPATIENT
Start: 2021-01-18 | End: 2021-05-25

## 2021-01-18 RX ORDER — SULFAMETHOXAZOLE/TRIMETHOPRIM 800-160 MG
1 TABLET ORAL 2 TIMES DAILY
Qty: 14 TABLET | Refills: 0 | Status: SHIPPED | OUTPATIENT
Start: 2021-01-18 | End: 2021-01-18

## 2021-01-18 NOTE — PATIENT INSTRUCTIONS
Apply heat 3-4 times a day.     Take the complete course of the antibiotic.          Patient Education     Abscess (Antibiotic Treatment Only)  An abscess happens when bacteria get trapped under the skin and start to grow. Pus forms inside the abscess as the body responds to the bacteria. An abscess can happen with an insect bite, ingrown hair, blocked oil gland, pimple, cyst, or puncture wound. It is sometimes call a boil.   In the early stages, your wound may be red and tender. For this stage, you may get antibiotics. If the abscess does not get better with antibiotics, it will need to be drained with a small cut.   Home care  These tips will help you care for your abscess at home:    Soak the wound in hot water or apply hot packs (small towel soaked in hot water) to the area for 20 minutes at a time. Do this 3 to 4 times a day, or as instructed. Use a new towel each time. Wash the towels afterward because they may be contaminated with bacteria after use.    Don't cut, squeeze, or pop the boil yourself.    Put antibiotic cream or ointment on the skin 3 to 4 times a day, unless something else was prescribed. Some ointments include an antibiotic plus a pain reliever.    If your healthcare provider prescribed antibiotics, don't stop taking them until you have finished the medicine or you are told to stop.    You may use an over-the-counter pain medicine to control pain, unless another pain medicine was prescribed. Talk with your provider before taking these medicines if you have chronic liver or kidney disease or ever had a stomach ulcer or digestive bleeding.  Follow-up care  Follow up with your healthcare provider, or as advised. Check your wound each day for the signs that the infection may be getting worse (see below).   When to seek medical advice  Get prompt medical attention if any of these occur:    An increase in redness or swelling    Red streaks in the skin leading away from the abscess    An increase in  local pain or swelling    Fever of 100.4 F (38 C) or higher, or as directed by your healthcare provider    Pus or fluid coming from the abscess    Boil returns after getting better  Liu last reviewed this educational content on 8/1/2019 2000-2020 [employer]. [address] [place] [address]. All rights reserved. This information is not intended as a substitute for professional medical care. Always follow your healthcare professional's instructions.

## 2021-01-18 NOTE — PROGRESS NOTES
Assessment & Plan     Abscess of left groin    Non-fluctuant. Treat with antibiotic and warm compresses.    - sulfamethoxazole-trimethoprim (BACTRIM DS) 800-160 MG tablet; Take 1 tablet by mouth 2 times daily                           Patient Instructions   Apply heat 3-4 times a day.     Take the complete course of the antibiotic.          No follow-ups on file.    ZULEYMA Jackson [facility] ISADORA Pendleton is a 50 year old who presents to clinic today for the following health issues     HPI       Vaginal Lumps  Onset/Duration: 5 days ago  Description:  Little lumps around groin area -L side  Vaginal Discharge: none   Itching (Pruritis): no  Burning sensation:  no  Odor: no  Accompanying Signs & Symptoms:  Urinary symptoms: no  Abdominal pain: YES- from the lumps that are tender  Fever: no  History:   Sexually active: YES  New Partner: no  Possibility of Pregnancy:  no  Recent antibiotic use: no  Previous vaginitis issues: no  Precipitating or alleviating factors: has had ingrown hairs before  Therapies tried and outcome: none- tried to pop it    Denies drainage. Some redness and swelling.       Review of Systems   Constitutional, HEENT, cardiovascular, pulmonary, gi and gu systems are negative, except as otherwise noted.        Objective    /82 (BP Location: Right arm, Patient Position: Chair, Cuff Size: Adult Large)   Pulse 64   Temp 97.8  F (36.6  C) (Oral)   Resp 16   Wt 143.3 kg (316 lb)   LMP 09/05/2020   BMI 49.49 kg/m    Body mass index is 49.49 kg/m .       Physical Exam   GENERAL: healthy, alert and no distress  EYES: Eyes grossly normal to inspection, PERRL and conjunctivae and sclerae normal   (female): 2 abscesses present in left groin. Mild erythema and swelling. Firm and non-fluctuant. Tender to palpation. About 0.5 cm in diameter.   MS: no gross musculoskeletal defects noted, no edema  SKIN: no suspicious lesions or rashes  NEURO: Normal  strength and tone, mentation intact and speech normal  PSYCH: mentation appears normal, affect normal/bright    No testing indicated.

## 2021-01-19 ENCOUNTER — VIRTUAL VISIT (OUTPATIENT)
Dept: ENDOCRINOLOGY | Facility: CLINIC | Age: 51
End: 2021-01-19
Payer: COMMERCIAL

## 2021-01-19 DIAGNOSIS — E66.01 CLASS 3 SEVERE OBESITY DUE TO EXCESS CALORIES WITH SERIOUS COMORBIDITY AND BODY MASS INDEX (BMI) OF 45.0 TO 49.9 IN ADULT (H): Primary | ICD-10-CM

## 2021-01-19 DIAGNOSIS — E55.9 VITAMIN D DEFICIENCY: ICD-10-CM

## 2021-01-19 DIAGNOSIS — R73.03 PREDIABETES: ICD-10-CM

## 2021-01-19 DIAGNOSIS — E66.813 CLASS 3 SEVERE OBESITY DUE TO EXCESS CALORIES WITH SERIOUS COMORBIDITY AND BODY MASS INDEX (BMI) OF 45.0 TO 49.9 IN ADULT (H): Primary | ICD-10-CM

## 2021-01-19 PROCEDURE — 99214 OFFICE O/P EST MOD 30 MIN: CPT | Mod: 95 | Performed by: INTERNAL MEDICINE

## 2021-01-19 NOTE — LETTER
"    1/19/2021         RE: Keerthi Sloan  [address]  [place] [address]        Dear Colleague,    Thank you for referring your patient, Keerthi Sloan, to the [facility]. Please see a copy of my visit note below.    Keerthi is a 50 year old who is being evaluated via a billable video visit.      How would you like to obtain your AVS? MyChart  If the video visit is dropped, the invitation should be resent by:   Will anyone else be joining your video visit? No      Video Start Time: 9:07 AM    CC: Multiple issues.      HPI:   Patient presents for management of multiple issues.   Previously seen by Janet Castaneda CNP.     \"Morbid Obesity - She started gaining weight in her 30's.  Weight stabilized in the low 300's the past 5 years and she was unable to lose any weight with diet efforts.  She considered gastric bypass and was being seen at [facility] in anticipation of doing the procedure but she didn't feel comfortable doing the bypass so quit going to the clinic.       She was briefly treated with Phentermine 5/2016.  She discontinued this on her own after 1-2 months.  Was able to lose 51 lbs with diet efforts, 305-->288-->254 lbs, since 12/2015,then weight increased to 280 lbs, then over 300 lbs.     Hysterectomy for uterine fibroids 9/23/2020.  Has anemia secondary to the fibroids.  Required some iron infusions pre-surgery.  Now taking prenatal vitamin with iron.  Has upcoming labs to reassess hemoglobin level.     Phentermine 37.5 mg/day and topiramate 25 mg bid resumed 9/2020 - about 6 weeks ago.  She delayed restarting the phentermine until after her hysterectomy.  Weight was 320 lbs when she restarted the phentermine, now 309 lbs.     + prediabetes - Previously treated with Metformin 500 mg bid.  A1c improved 6.1--->5.5%. Has been off metformin for > 1 year.  A1c increased to 5.9% (6/2020).  Restarted Metformin  mg bid 6/2020.  Tolerating Metformin " "well.  +vitamin D deficiency - D level 15, started vitamin D 50,000 IU weekly.  D improved to 21-22.  Has been off all D replacement for > 1 year. Vitamin D level was low at 16 (6/2020).  Vitamin D 50,000 units weekly was restarted at that time.  Sleep Apnea/Snores:Yes: diagnosed with sleep apnea, uses CPAP  Hyperlipidemia:Yes: elevated triglycerides  Hirsutism:Yes: increased facial hair growth (chin)  Family history of Obesity:Yes: Mother had gastric bypass  Diet: yes, resumed diet efforts.  Exercise: yes, plans to resume.   Other: elevated 11pm salivary cortisol.  1 mg Dexamethasone suppression test was normal.  \"    She has never broken a bone or had a kidney stone.   Taking 50,000 international unit(s) of D3 a week.   Eats 2-3 servings of dairy a day.   Takes a multivitamin as well.     Surgery in 9/2020, BREE.     Continues metformin 500 mg BID. She did stop it for one month after she became concerned about the recall. Resumed two weeks ago.     Continues phentermine 37.5 mg every day, and topamax 50 mg every day.     No formal exercise program. Used to use a Fit Bit. Does not recall how many steps she was getting. Not following any particular diet. Not currently having a problem with cravings.   Prior day included doing a \"fast\" one a month for 10 days where she had smoothies and small snacks.     ROS: 10 point ROS neg other than the symptoms noted above in the HPI.    PMH:   Patient Active Problem List   Diagnosis     Grief     Binge eating     Dysmenorrhea     Morbid obesity (H)     History of biliary T-tube placement     Prediabetes     Vitamin D deficiency     Anemia, iron deficiency     Obstructive sleep apnea syndrome     Stress incontinence in female     Uterine leiomyoma     BMI 40.0-44.9, adult (H)     PTSD (post-traumatic stress disorder)     Adjustment disorder with mixed anxiety and depressed mood     Adjustment insomnia     Iron deficiency anemia due to chronic blood loss     Intramural and " submucous leiomyoma of uterus     Fibroid uterus     Menorrhagia     S/P BREE (total abdominal hysterectomy)     Meds:  Current Outpatient Medications   Medication     acetaminophen (TYLENOL) 325 MG tablet     ibuprofen (ADVIL/MOTRIN) 200 MG tablet     metFORMIN (GLUCOPHAGE) 500 MG tablet     methocarbamol (ROBAXIN) 500 MG tablet     omeprazole (PRILOSEC) 40 MG capsule     phentermine (ADIPEX-P) 37.5 MG tablet     Prenatal MV-Min-Fe Fum-FA-DHA (PRENATAL 1 PO)     sertraline (ZOLOFT) 100 MG tablet     sulfamethoxazole-trimethoprim (BACTRIM DS) 800-160 MG tablet     SUMAtriptan (IMITREX) 25 MG tablet     topiramate (TOPAMAX) 25 MG tablet     traZODone (DESYREL) 150 MG tablet     vitamin D3 (CHOLECALCIFEROL) 1.25 MG (53642 UT) capsule     No current facility-administered medications for this visit.       FHX:   Mother had RYGB.     SHX:  Non-smoker.    Exam:   GENERAL: Healthy, alert and no distress  EYES: Eyes grossly normal to inspection.  No discharge or erythema, or obvious scleral/conjunctival abnormalities.  NECK: No asymmetry, visible masses or scars  RESP: No audible wheeze, cough, or visible cyanosis.  No visible retractions or increased work of breathing.    MS: No gross musculoskeletal defects noted.  Normal range of motion.  No visible edema.  SKIN: Visible skin clear. No significant rash, abnormal pigmentation or lesions.  NEURO: Cranial nerves grossly intact.  Mentation and speech appropriate for age.  PSYCH: Mentation appears normal, affect normal/bright, judgement and insight intact, normal speech and appearance well-groomed.     A/P:   Vitamin D deficiency - Currently taking 50,000 international unit(s) of D3 a week. Sufficient calcium intake between her diet and multivitamin.   -Check vitamin D level.     Pre-diabetes - Recent stoppage of metformin use due to concerns about the recall.   -Check HbA1C.     Obesity - She is currently on phentermine and topamax. Side effects reviewed. Weight currently  stable. BMR 2200. No formal exercise. Not following any particular diet.   -Continue phentermine and topamax.   -Check BMP.   -Start using Fit bit again. Goal of 10,000-15,000 steps a day.   -Goal of less than 1700 calories a day for weight loss.   -Nutrition referral.       Video-Visit Details    Type of service:  Video Visit    Video End Time:9:29 AM    Originating Location (pt. Location): Home    Distant Location (provider location):  [facility]     Platform used for Video Visit: [facility]     Killian Loera MD on 1/19/2021 at 9:47 AM          Again, thank you for allowing me to participate in the care of your patient.        Sincerely,        Killian Loera MD

## 2021-01-19 NOTE — PROGRESS NOTES
"Keerthi is a 50 year old who is being evaluated via a billable video visit.      How would you like to obtain your AVS? MyChart  If the video visit is dropped, the invitation should be resent by:   Will anyone else be joining your video visit? No      Video Start Time: 9:07 AM    CC: Multiple issues.      HPI:   Patient presents for management of multiple issues.   Previously seen by Janet Castaneda CNP.     \"Morbid Obesity - She started gaining weight in her 30's.  Weight stabilized in the low 300's the past 5 years and she was unable to lose any weight with diet efforts.  She considered gastric bypass and was being seen at [facility] in anticipation of doing the procedure but she didn't feel comfortable doing the bypass so quit going to the clinic.       She was briefly treated with Phentermine 5/2016.  She discontinued this on her own after 1-2 months.  Was able to lose 51 lbs with diet efforts, 305-->288-->254 lbs, since 12/2015,then weight increased to 280 lbs, then over 300 lbs.     Hysterectomy for uterine fibroids 9/23/2020.  Has anemia secondary to the fibroids.  Required some iron infusions pre-surgery.  Now taking prenatal vitamin with iron.  Has upcoming labs to reassess hemoglobin level.     Phentermine 37.5 mg/day and topiramate 25 mg bid resumed 9/2020 - about 6 weeks ago.  She delayed restarting the phentermine until after her hysterectomy.  Weight was 320 lbs when she restarted the phentermine, now 309 lbs.     + prediabetes - Previously treated with Metformin 500 mg bid.  A1c improved 6.1--->5.5%. Has been off metformin for > 1 year.  A1c increased to 5.9% (6/2020).  Restarted Metformin  mg bid 6/2020.  Tolerating Metformin well.  +vitamin D deficiency - D level 15, started vitamin D 50,000 IU weekly.  D improved to 21-22.  Has been off all D replacement for > 1 year. Vitamin D level was low at 16 (6/2020).  Vitamin D 50,000 units weekly was restarted at that time.  Sleep Apnea/Snores:Yes: diagnosed " "with sleep apnea, uses CPAP  Hyperlipidemia:Yes: elevated triglycerides  Hirsutism:Yes: increased facial hair growth (chin)  Family history of Obesity:Yes: Mother had gastric bypass  Diet: yes, resumed diet efforts.  Exercise: yes, plans to resume.   Other: elevated 11pm salivary cortisol.  1 mg Dexamethasone suppression test was normal.  \"    She has never broken a bone or had a kidney stone.   Taking 50,000 international unit(s) of D3 a week.   Eats 2-3 servings of dairy a day.   Takes a multivitamin as well.     Surgery in 9/2020, BREE.     Continues metformin 500 mg BID. She did stop it for one month after she became concerned about the recall. Resumed two weeks ago.     Continues phentermine 37.5 mg every day, and topamax 50 mg every day.     No formal exercise program. Used to use a Fit Bit. Does not recall how many steps she was getting. Not following any particular diet. Not currently having a problem with cravings.   Prior day included doing a \"fast\" one a month for 10 days where she had smoothies and small snacks.     ROS: 10 point ROS neg other than the symptoms noted above in the HPI.    PMH:   Patient Active Problem List   Diagnosis     Grief     Binge eating     Dysmenorrhea     Morbid obesity (H)     History of biliary T-tube placement     Prediabetes     Vitamin D deficiency     Anemia, iron deficiency     Obstructive sleep apnea syndrome     Stress incontinence in female     Uterine leiomyoma     BMI 40.0-44.9, adult (H)     PTSD (post-traumatic stress disorder)     Adjustment disorder with mixed anxiety and depressed mood     Adjustment insomnia     Iron deficiency anemia due to chronic blood loss     Intramural and submucous leiomyoma of uterus     Fibroid uterus     Menorrhagia     S/P BREE (total abdominal hysterectomy)     Meds:  Current Outpatient Medications   Medication     acetaminophen (TYLENOL) 325 MG tablet     ibuprofen (ADVIL/MOTRIN) 200 MG tablet     metFORMIN (GLUCOPHAGE) 500 MG tablet "     methocarbamol (ROBAXIN) 500 MG tablet     omeprazole (PRILOSEC) 40 MG capsule     phentermine (ADIPEX-P) 37.5 MG tablet     Prenatal MV-Min-Fe Fum-FA-DHA (PRENATAL 1 PO)     sertraline (ZOLOFT) 100 MG tablet     sulfamethoxazole-trimethoprim (BACTRIM DS) 800-160 MG tablet     SUMAtriptan (IMITREX) 25 MG tablet     topiramate (TOPAMAX) 25 MG tablet     traZODone (DESYREL) 150 MG tablet     vitamin D3 (CHOLECALCIFEROL) 1.25 MG (54382 UT) capsule     No current facility-administered medications for this visit.       FHX:   Mother had RYGB.     SHX:  Non-smoker.    Exam:   GENERAL: Healthy, alert and no distress  EYES: Eyes grossly normal to inspection.  No discharge or erythema, or obvious scleral/conjunctival abnormalities.  NECK: No asymmetry, visible masses or scars  RESP: No audible wheeze, cough, or visible cyanosis.  No visible retractions or increased work of breathing.    MS: No gross musculoskeletal defects noted.  Normal range of motion.  No visible edema.  SKIN: Visible skin clear. No significant rash, abnormal pigmentation or lesions.  NEURO: Cranial nerves grossly intact.  Mentation and speech appropriate for age.  PSYCH: Mentation appears normal, affect normal/bright, judgement and insight intact, normal speech and appearance well-groomed.     A/P:   Vitamin D deficiency - Currently taking 50,000 international unit(s) of D3 a week. Sufficient calcium intake between her diet and multivitamin.   -Check vitamin D level.     Pre-diabetes - Recent stoppage of metformin use due to concerns about the recall.   -Check HbA1C.     Obesity - She is currently on phentermine and topamax. Side effects reviewed. Weight currently stable. BMR 2200. No formal exercise. Not following any particular diet.   -Continue phentermine and topamax.   -Check BMP.   -Start using Fit bit again. Goal of 10,000-15,000 steps a day.   -Goal of less than 1700 calories a day for weight loss.   -Nutrition referral.       Video-Visit  Details    Type of service:  Video Visit    Video End Time:9:29 AM    Originating Location (pt. Location): Home    Distant Location (provider location):  [facility]     Platform used for Video Visit: Augustine Loera MD on 1/19/2021 at 9:47 AM

## 2021-03-14 ENCOUNTER — HEALTH MAINTENANCE LETTER (OUTPATIENT)
Age: 51
End: 2021-03-14

## 2021-04-25 ENCOUNTER — TELEPHONE (OUTPATIENT)
Dept: FAMILY MEDICINE | Facility: CLINIC | Age: 51
End: 2021-04-25

## 2021-04-25 DIAGNOSIS — F43.23 ADJUSTMENT DISORDER WITH MIXED ANXIETY AND DEPRESSED MOOD: ICD-10-CM

## 2021-04-25 DIAGNOSIS — F43.10 PTSD (POST-TRAUMATIC STRESS DISORDER): ICD-10-CM

## 2021-05-05 NOTE — TELEPHONE ENCOUNTER
Spoke with patient and scheduled annual physical and med check with PCP on 5/25. She states she should have enough medication to last her until this appointment.    Routing encounter to RN to please un-pend medication and close encounter.    Reza Garcia

## 2021-05-06 RX ORDER — SERTRALINE HYDROCHLORIDE 100 MG/1
150 TABLET, FILM COATED ORAL DAILY
Qty: 135 TABLET | Refills: 0 | OUTPATIENT
Start: 2021-05-06

## 2021-05-09 ENCOUNTER — HEALTH MAINTENANCE LETTER (OUTPATIENT)
Age: 51
End: 2021-05-09

## 2021-05-11 DIAGNOSIS — E55.9 VITAMIN D DEFICIENCY: ICD-10-CM

## 2021-05-11 RX ORDER — METHOCARBAMOL 750 MG/1
TABLET ORAL
Qty: 4 CAPSULE | Refills: 8 | Status: SHIPPED | OUTPATIENT
Start: 2021-05-11 | End: 2021-12-15

## 2021-05-22 DIAGNOSIS — E66.01 CLASS 3 SEVERE OBESITY DUE TO EXCESS CALORIES WITH SERIOUS COMORBIDITY AND BODY MASS INDEX (BMI) OF 45.0 TO 49.9 IN ADULT (H): ICD-10-CM

## 2021-05-22 DIAGNOSIS — R73.03 PREDIABETES: ICD-10-CM

## 2021-05-22 DIAGNOSIS — E66.813 CLASS 3 SEVERE OBESITY DUE TO EXCESS CALORIES WITH SERIOUS COMORBIDITY AND BODY MASS INDEX (BMI) OF 45.0 TO 49.9 IN ADULT (H): ICD-10-CM

## 2021-05-24 NOTE — TELEPHONE ENCOUNTER
Routing refill request to provider for review/approval because:  New for Dr Loera, LS retired  Pt has appointment with PCP tomorrow  Kristen Vargas RN, BSN  Message handled by CLINIC NURSE.

## 2021-05-25 ENCOUNTER — OFFICE VISIT (OUTPATIENT)
Dept: FAMILY MEDICINE | Facility: CLINIC | Age: 51
End: 2021-05-25
Payer: COMMERCIAL

## 2021-05-25 VITALS
HEIGHT: 68 IN | DIASTOLIC BLOOD PRESSURE: 82 MMHG | HEART RATE: 64 BPM | TEMPERATURE: 98.1 F | SYSTOLIC BLOOD PRESSURE: 128 MMHG | WEIGHT: 293 LBS | OXYGEN SATURATION: 98 % | BODY MASS INDEX: 44.41 KG/M2

## 2021-05-25 DIAGNOSIS — E66.01 MORBID OBESITY (H): ICD-10-CM

## 2021-05-25 DIAGNOSIS — F43.23 ADJUSTMENT DISORDER WITH MIXED ANXIETY AND DEPRESSED MOOD: ICD-10-CM

## 2021-05-25 DIAGNOSIS — Z00.00 ROUTINE GENERAL MEDICAL EXAMINATION AT A HEALTH CARE FACILITY: Primary | ICD-10-CM

## 2021-05-25 DIAGNOSIS — Z12.31 ENCOUNTER FOR SCREENING MAMMOGRAM FOR BREAST CANCER: ICD-10-CM

## 2021-05-25 DIAGNOSIS — F43.10 PTSD (POST-TRAUMATIC STRESS DISORDER): ICD-10-CM

## 2021-05-25 DIAGNOSIS — R82.90 ABNORMAL URINE FINDINGS: ICD-10-CM

## 2021-05-25 DIAGNOSIS — R73.03 PREDIABETES: ICD-10-CM

## 2021-05-25 DIAGNOSIS — Z11.59 ENCOUNTER FOR HEPATITIS C SCREENING TEST FOR LOW RISK PATIENT: ICD-10-CM

## 2021-05-25 DIAGNOSIS — E55.9 VITAMIN D DEFICIENCY: ICD-10-CM

## 2021-05-25 DIAGNOSIS — N30.00 ACUTE CYSTITIS WITHOUT HEMATURIA: ICD-10-CM

## 2021-05-25 DIAGNOSIS — G43.109 MIGRAINE WITH AURA AND WITHOUT STATUS MIGRAINOSUS, NOT INTRACTABLE: ICD-10-CM

## 2021-05-25 DIAGNOSIS — M62.838 MUSCLE SPASM: ICD-10-CM

## 2021-05-25 DIAGNOSIS — Z11.4 ENCOUNTER FOR SCREENING FOR HIV: ICD-10-CM

## 2021-05-25 DIAGNOSIS — F51.02 ADJUSTMENT INSOMNIA: ICD-10-CM

## 2021-05-25 DIAGNOSIS — D50.0 IRON DEFICIENCY ANEMIA DUE TO CHRONIC BLOOD LOSS: ICD-10-CM

## 2021-05-25 DIAGNOSIS — Z11.3 SCREEN FOR STD (SEXUALLY TRANSMITTED DISEASE): ICD-10-CM

## 2021-05-25 LAB
ALBUMIN SERPL-MCNC: 3.7 G/DL (ref 3.4–5)
ALBUMIN UR-MCNC: 30 MG/DL
ALP SERPL-CCNC: 64 U/L (ref 40–150)
ALT SERPL W P-5'-P-CCNC: 21 U/L (ref 0–50)
ANION GAP SERPL CALCULATED.3IONS-SCNC: 8 MMOL/L (ref 3–14)
APPEARANCE UR: ABNORMAL
AST SERPL W P-5'-P-CCNC: 12 U/L (ref 0–45)
BACTERIA #/AREA URNS HPF: ABNORMAL /HPF
BASOPHILS # BLD AUTO: 0 10E9/L (ref 0–0.2)
BASOPHILS NFR BLD AUTO: 0.3 %
BILIRUB SERPL-MCNC: 0.4 MG/DL (ref 0.2–1.3)
BILIRUB UR QL STRIP: NEGATIVE
BUN SERPL-MCNC: 10 MG/DL (ref 7–30)
CALCIUM SERPL-MCNC: 9.1 MG/DL (ref 8.5–10.1)
CHLORIDE SERPL-SCNC: 105 MMOL/L (ref 94–109)
CHOLEST SERPL-MCNC: 201 MG/DL
CO2 SERPL-SCNC: 27 MMOL/L (ref 20–32)
COLOR UR AUTO: YELLOW
CREAT SERPL-MCNC: 0.63 MG/DL (ref 0.52–1.04)
DIFFERENTIAL METHOD BLD: ABNORMAL
EOSINOPHIL # BLD AUTO: 0.1 10E9/L (ref 0–0.7)
EOSINOPHIL NFR BLD AUTO: 1.2 %
ERYTHROCYTE [DISTWIDTH] IN BLOOD BY AUTOMATED COUNT: 18.3 % (ref 10–15)
FERRITIN SERPL-MCNC: 18 NG/ML (ref 8–252)
GFR SERPL CREATININE-BSD FRML MDRD: >90 ML/MIN/{1.73_M2}
GLUCOSE SERPL-MCNC: 98 MG/DL (ref 70–99)
GLUCOSE UR STRIP-MCNC: NEGATIVE MG/DL
HBA1C MFR BLD: 5.9 % (ref 0–5.6)
HCT VFR BLD AUTO: 46.4 % (ref 35–47)
HDLC SERPL-MCNC: 59 MG/DL
HGB BLD-MCNC: 14.6 G/DL (ref 11.7–15.7)
HGB UR QL STRIP: ABNORMAL
IRON SATN MFR SERPL: 14 % (ref 15–46)
IRON SERPL-MCNC: 41 UG/DL (ref 35–180)
KETONES UR STRIP-MCNC: NEGATIVE MG/DL
LDLC SERPL CALC-MCNC: 116 MG/DL
LEUKOCYTE ESTERASE UR QL STRIP: ABNORMAL
LYMPHOCYTES # BLD AUTO: 2.3 10E9/L (ref 0.8–5.3)
LYMPHOCYTES NFR BLD AUTO: 35 %
MCH RBC QN AUTO: 25.7 PG (ref 26.5–33)
MCHC RBC AUTO-ENTMCNC: 31.5 G/DL (ref 31.5–36.5)
MCV RBC AUTO: 82 FL (ref 78–100)
MONOCYTES # BLD AUTO: 0.5 10E9/L (ref 0–1.3)
MONOCYTES NFR BLD AUTO: 7.4 %
NEUTROPHILS # BLD AUTO: 3.7 10E9/L (ref 1.6–8.3)
NEUTROPHILS NFR BLD AUTO: 56.1 %
NITRATE UR QL: POSITIVE
NON-SQ EPI CELLS #/AREA URNS LPF: ABNORMAL /LPF
NONHDLC SERPL-MCNC: 142 MG/DL
PH UR STRIP: 6.5 PH (ref 5–7)
PLATELET # BLD AUTO: 260 10E9/L (ref 150–450)
POTASSIUM SERPL-SCNC: 3.5 MMOL/L (ref 3.4–5.3)
PROT SERPL-MCNC: 7.9 G/DL (ref 6.8–8.8)
RBC # BLD AUTO: 5.68 10E12/L (ref 3.8–5.2)
RBC #/AREA URNS AUTO: ABNORMAL /HPF
SODIUM SERPL-SCNC: 140 MMOL/L (ref 133–144)
SOURCE: ABNORMAL
SP GR UR STRIP: 1.02 (ref 1–1.03)
TIBC SERPL-MCNC: 293 UG/DL (ref 240–430)
TRIGL SERPL-MCNC: 128 MG/DL
UROBILINOGEN UR STRIP-ACNC: 0.2 EU/DL (ref 0.2–1)
WBC # BLD AUTO: 6.6 10E9/L (ref 4–11)
WBC #/AREA URNS AUTO: ABNORMAL /HPF

## 2021-05-25 PROCEDURE — 87491 CHLMYD TRACH DNA AMP PROBE: CPT | Performed by: FAMILY MEDICINE

## 2021-05-25 PROCEDURE — 87086 URINE CULTURE/COLONY COUNT: CPT | Performed by: FAMILY MEDICINE

## 2021-05-25 PROCEDURE — 87088 URINE BACTERIA CULTURE: CPT | Performed by: FAMILY MEDICINE

## 2021-05-25 PROCEDURE — 83550 IRON BINDING TEST: CPT | Performed by: FAMILY MEDICINE

## 2021-05-25 PROCEDURE — 36415 COLL VENOUS BLD VENIPUNCTURE: CPT | Performed by: FAMILY MEDICINE

## 2021-05-25 PROCEDURE — 81001 URINALYSIS AUTO W/SCOPE: CPT | Performed by: FAMILY MEDICINE

## 2021-05-25 PROCEDURE — 83036 HEMOGLOBIN GLYCOSYLATED A1C: CPT | Performed by: FAMILY MEDICINE

## 2021-05-25 PROCEDURE — 83540 ASSAY OF IRON: CPT | Performed by: FAMILY MEDICINE

## 2021-05-25 PROCEDURE — 85025 COMPLETE CBC W/AUTO DIFF WBC: CPT | Performed by: FAMILY MEDICINE

## 2021-05-25 PROCEDURE — 80053 COMPREHEN METABOLIC PANEL: CPT | Performed by: FAMILY MEDICINE

## 2021-05-25 PROCEDURE — 87186 SC STD MICRODIL/AGAR DIL: CPT | Performed by: FAMILY MEDICINE

## 2021-05-25 PROCEDURE — 99214 OFFICE O/P EST MOD 30 MIN: CPT | Mod: 25 | Performed by: FAMILY MEDICINE

## 2021-05-25 PROCEDURE — 86803 HEPATITIS C AB TEST: CPT | Performed by: FAMILY MEDICINE

## 2021-05-25 PROCEDURE — 80061 LIPID PANEL: CPT | Performed by: FAMILY MEDICINE

## 2021-05-25 PROCEDURE — 87591 N.GONORRHOEAE DNA AMP PROB: CPT | Performed by: FAMILY MEDICINE

## 2021-05-25 PROCEDURE — 82728 ASSAY OF FERRITIN: CPT | Performed by: FAMILY MEDICINE

## 2021-05-25 PROCEDURE — 87389 HIV-1 AG W/HIV-1&-2 AB AG IA: CPT | Performed by: FAMILY MEDICINE

## 2021-05-25 PROCEDURE — 99396 PREV VISIT EST AGE 40-64: CPT | Mod: 25 | Performed by: FAMILY MEDICINE

## 2021-05-25 PROCEDURE — 82306 VITAMIN D 25 HYDROXY: CPT | Performed by: FAMILY MEDICINE

## 2021-05-25 RX ORDER — SUMATRIPTAN 25 MG/1
25 TABLET, FILM COATED ORAL
Qty: 18 TABLET | Refills: 3 | Status: SHIPPED | OUTPATIENT
Start: 2021-05-25

## 2021-05-25 RX ORDER — SERTRALINE HYDROCHLORIDE 100 MG/1
150 TABLET, FILM COATED ORAL DAILY
Qty: 135 TABLET | Refills: 1 | Status: SHIPPED | OUTPATIENT
Start: 2021-05-25 | End: 2021-11-01

## 2021-05-25 RX ORDER — TRAZODONE HYDROCHLORIDE 150 MG/1
150 TABLET ORAL
Qty: 90 TABLET | Refills: 1 | Status: SHIPPED | OUTPATIENT
Start: 2021-05-25 | End: 2021-11-01

## 2021-05-25 RX ORDER — METHOCARBAMOL 500 MG/1
TABLET, FILM COATED ORAL
Qty: 24 TABLET | Refills: 3 | Status: SHIPPED | OUTPATIENT
Start: 2021-05-25

## 2021-05-25 RX ORDER — NITROFURANTOIN 25; 75 MG/1; MG/1
100 CAPSULE ORAL 2 TIMES DAILY
Qty: 10 CAPSULE | Refills: 0 | Status: SHIPPED | OUTPATIENT
Start: 2021-05-25 | End: 2021-05-30

## 2021-05-25 ASSESSMENT — ENCOUNTER SYMPTOMS
FREQUENCY: 1
PALPITATIONS: 0
HEMATURIA: 0
BREAST MASS: 0
WEAKNESS: 0
DYSURIA: 0
FEVER: 0
JOINT SWELLING: 0
HEADACHES: 1
SORE THROAT: 0
CHILLS: 0
ABDOMINAL PAIN: 0
COUGH: 0
CONSTIPATION: 0
SHORTNESS OF BREATH: 0
PARESTHESIAS: 0
NAUSEA: 0
DIZZINESS: 0
MYALGIAS: 0
EYE PAIN: 0
HEARTBURN: 0
NERVOUS/ANXIOUS: 1
DIARRHEA: 0
ARTHRALGIAS: 1
HEMATOCHEZIA: 0

## 2021-05-25 ASSESSMENT — MIFFLIN-ST. JEOR: SCORE: 2072.03
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
## 2021-05-26 LAB
BACTERIA SPEC CULT: ABNORMAL
C TRACH DNA SPEC QL NAA+PROBE: NEGATIVE
DEPRECATED CALCIDIOL+CALCIFEROL SERPL-MC: 32 UG/L (ref 20–75)
HCV AB SERPL QL IA: NONREACTIVE
HIV 1+2 AB+HIV1 P24 AG SERPL QL IA: NONREACTIVE
Lab: ABNORMAL
N GONORRHOEA DNA SPEC QL NAA+PROBE: NEGATIVE
SPECIMEN SOURCE: ABNORMAL
SPECIMEN SOURCE: NORMAL
SPECIMEN SOURCE: NORMAL

## 2021-05-27 ENCOUNTER — TELEPHONE (OUTPATIENT)
Dept: FAMILY MEDICINE | Facility: CLINIC | Age: 51
End: 2021-05-27

## 2021-05-27 NOTE — TELEPHONE ENCOUNTER
Attempt #1 to call patient.     RN left voicemail at home number and requested return call to [facility] at [phone].     Precious Worthington RN, BSN, PHN  [facility]: [place]        ----- Message from Elida Castaneda, DO sent at 5/27/2021  1:37 PM CDT -----  Team - please call patient with results.  Urine culture is showing bacteria that is intermediately sensitive to nitrofurantoin.  Unfortunately, I sent nitrofurantoin for her treatment.  It may work just fine.  However, a little higher risk for resistance.  If her symptoms have continued then I will send in a new abx for her.

## 2021-05-28 NOTE — TELEPHONE ENCOUNTER
Called and reviewed providers message below. She verbalized an understanding. She will call the clinic back if her symptoms return or has any questions/concerns.    Funmilayo Mckee RN

## 2021-06-09 DIAGNOSIS — E55.9 VITAMIN D DEFICIENCY: ICD-10-CM

## 2021-06-09 DIAGNOSIS — E66.813 CLASS 3 SEVERE OBESITY DUE TO EXCESS CALORIES WITH SERIOUS COMORBIDITY AND BODY MASS INDEX (BMI) OF 45.0 TO 49.9 IN ADULT (H): ICD-10-CM

## 2021-06-09 DIAGNOSIS — E66.01 CLASS 3 SEVERE OBESITY DUE TO EXCESS CALORIES WITH SERIOUS COMORBIDITY AND BODY MASS INDEX (BMI) OF 45.0 TO 49.9 IN ADULT (H): ICD-10-CM

## 2021-06-09 DIAGNOSIS — R73.03 PREDIABETES: ICD-10-CM

## 2021-06-10 RX ORDER — PHENTERMINE HYDROCHLORIDE 37.5 MG/1
TABLET ORAL
Qty: 30 TABLET | Refills: 1 | Status: SHIPPED | OUTPATIENT
Start: 2021-06-10

## 2021-06-10 RX ORDER — TOPIRAMATE 25 MG/1
TABLET, FILM COATED ORAL
Qty: 360 TABLET | Refills: 1 | Status: SHIPPED | OUTPATIENT
Start: 2021-06-10

## 2021-06-10 NOTE — TELEPHONE ENCOUNTER
Routing refill request to provider for review/approval because:  Labs out of range: No Normal CBC on file in past 26 months  Patient needs to be seen because:  As indicated in providers note - required f/u 4/2021. No appointment scheduled.     Lizeth Woodard, NUNON, RN  [facility]

## 2021-06-14 ENCOUNTER — MYC MEDICAL ADVICE (OUTPATIENT)
Dept: FAMILY MEDICINE | Facility: CLINIC | Age: 51
End: 2021-06-14

## 2021-06-14 DIAGNOSIS — N30.90 BLADDER INFECTION: Primary | ICD-10-CM

## 2021-06-15 RX ORDER — CEPHALEXIN 500 MG/1
500 CAPSULE ORAL 2 TIMES DAILY
Qty: 14 CAPSULE | Refills: 0 | Status: SHIPPED | OUTPATIENT
Start: 2021-06-15 | End: 2021-06-22

## 2021-06-15 NOTE — TELEPHONE ENCOUNTER
Routing My Chart message to NE providers    Patient states her UTI symptoms have returned      Dr. Castaneda comment 5-25    Team - please call patient with results.  Urine culture is showing bacteria that is intermediately sensitive to nitrofurantoin.  Unfortunately, I sent nitrofurantoin for her treatment.  It may work just fine.  However, a little higher risk for resistance.  If her symptoms have continued then I will send in a new abx for her.        Geraldo Rose RN, BSN, PHN  [facility]

## 2021-06-15 NOTE — TELEPHONE ENCOUNTER
Patient read providers My Chart Message.    Will close encounter      Geraldo Rose, RN, BSN, PHN  [facility]

## 2021-06-20 NOTE — LETTER
Letter by Gabriella Morrison RN at      Author: Gabriella Morrison RN Service: -- Author Type: --    Filed:  Encounter Date: 5/13/2020 Status: (Other)       5/13/2020        Keerthi Sloan  [id number]  [place] [address]    This letter provides a written record that you were tested for COVID-19 on 5/11/20.     Your result was negative.    This means that we didnt find the virus that causes COVID-19 in your sample. A test may show negative when you do actually have the virus. This can happen when the virus is in the early stages of infection, before you feel illness symptoms.    Even if you dont have symptoms, they may still appear. For safety, its very important to follow these rules.    Keep yourself away from others (self-isolation):      Stay home. Dont go to work, school or anywhere else.     Stay in your own room (and use your own bathroom), if you can.    Stay away from others in your home. No hugging, kissing or shaking hands. No visitors.    Clean high touch surfaces often (doorknobs, counters, handles, etc.). Use a household cleaning spray or wipes.    Cover your mouth and nose with a mask, tissue or washcloth to avoid spreading germs.    Wash your hands and face often with soap and water.    Stay in self-isolation until you meet ALL of the guidelines below:    1. You have had no fever for at least 72 hours (that is 3 full days of no fever without the use of medicine that reduces fevers), AND  2. other symptoms (such as cough, shortness of breath) have gotten better, AND  3. at least 10 days have passed since your symptoms first appeared.    Going back to work  Check with your employer for any guidelines to follow for going back to work.    Employers: This document serves as formal notice that your employee tested negative for COVID-19, as of the testing date shown above.    For questions regarding this letter or your Negative COVID-19 result, call [phone] between 8A to 6:30P (M-F) and 10A  to 6:30P (weekends).

## 2021-10-24 ENCOUNTER — HEALTH MAINTENANCE LETTER (OUTPATIENT)
Age: 51
End: 2021-10-24

## 2021-10-26 ENCOUNTER — TELEPHONE (OUTPATIENT)
Dept: FAMILY MEDICINE | Facility: CLINIC | Age: 51
End: 2021-10-26

## 2021-10-26 NOTE — TELEPHONE ENCOUNTER
Forms received from [employer] - claim #[id number] (date: 10/25/2021) for Elida Castaneda DO.  Forms placed in provider 'sign me' folder.  Please fax forms to [phone] after completion.    Tavo MCLEAN (R) (ARRT)  Radiology Dept

## 2021-10-26 NOTE — TELEPHONE ENCOUNTER
Scheduled patient for 11/1 for a virtual appointment to discuss these forms.    Veronica Alves/  [place]

## 2021-11-01 ENCOUNTER — VIRTUAL VISIT (OUTPATIENT)
Dept: FAMILY MEDICINE | Facility: CLINIC | Age: 51
End: 2021-11-01
Payer: COMMERCIAL

## 2021-11-01 DIAGNOSIS — F41.1 GAD (GENERALIZED ANXIETY DISORDER): ICD-10-CM

## 2021-11-01 DIAGNOSIS — F51.02 ADJUSTMENT INSOMNIA: ICD-10-CM

## 2021-11-01 DIAGNOSIS — F33.1 MODERATE EPISODE OF RECURRENT MAJOR DEPRESSIVE DISORDER (H): ICD-10-CM

## 2021-11-01 DIAGNOSIS — F43.10 PTSD (POST-TRAUMATIC STRESS DISORDER): Primary | ICD-10-CM

## 2021-11-01 PROCEDURE — 99215 OFFICE O/P EST HI 40 MIN: CPT | Mod: TEL | Performed by: FAMILY MEDICINE

## 2021-11-01 RX ORDER — TRAZODONE HYDROCHLORIDE 100 MG/1
200 TABLET ORAL
Qty: 90 TABLET | Refills: 1 | Status: SHIPPED | OUTPATIENT
Start: 2021-11-01

## 2021-11-01 RX ORDER — SERTRALINE HYDROCHLORIDE 100 MG/1
150 TABLET, FILM COATED ORAL DAILY
Qty: 135 TABLET | Refills: 1 | Status: SHIPPED | OUTPATIENT
Start: 2021-11-01

## 2021-11-01 ASSESSMENT — ANXIETY QUESTIONNAIRES
7. FEELING AFRAID AS IF SOMETHING AWFUL MIGHT HAPPEN: NEARLY EVERY DAY
IF YOU CHECKED OFF ANY PROBLEMS ON THIS QUESTIONNAIRE, HOW DIFFICULT HAVE THESE PROBLEMS MADE IT FOR YOU TO DO YOUR WORK, TAKE CARE OF THINGS AT HOME, OR GET ALONG WITH OTHER PEOPLE: VERY DIFFICULT
1. FEELING NERVOUS, ANXIOUS, OR ON EDGE: NEARLY EVERY DAY
3. WORRYING TOO MUCH ABOUT DIFFERENT THINGS: NEARLY EVERY DAY
5. BEING SO RESTLESS THAT IT IS HARD TO SIT STILL: NEARLY EVERY DAY
2. NOT BEING ABLE TO STOP OR CONTROL WORRYING: NEARLY EVERY DAY
6. BECOMING EASILY ANNOYED OR IRRITABLE: NEARLY EVERY DAY
GAD7 TOTAL SCORE: 21

## 2021-11-01 ASSESSMENT — PATIENT HEALTH QUESTIONNAIRE - PHQ9
5. POOR APPETITE OR OVEREATING: NEARLY EVERY DAY
SUM OF ALL RESPONSES TO PHQ QUESTIONS 1-9: 22
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
## 2021-11-02 ASSESSMENT — ANXIETY QUESTIONNAIRES: GAD7 TOTAL SCORE: 21

## 2021-11-02 NOTE — TELEPHONE ENCOUNTER
Form completed and signed and faxed.    Copy made for chart    Keerthi Alves/     Stat scan claribel

## 2021-11-09 ENCOUNTER — OFFICE VISIT (OUTPATIENT)
Dept: FAMILY MEDICINE | Facility: CLINIC | Age: 51
End: 2021-11-09
Payer: COMMERCIAL

## 2021-11-09 VITALS
RESPIRATION RATE: 18 BRPM | DIASTOLIC BLOOD PRESSURE: 82 MMHG | HEART RATE: 74 BPM | SYSTOLIC BLOOD PRESSURE: 116 MMHG | BODY MASS INDEX: 47.62 KG/M2 | OXYGEN SATURATION: 100 % | TEMPERATURE: 98 F | WEIGHT: 293 LBS

## 2021-11-09 DIAGNOSIS — R10.31 RLQ ABDOMINAL PAIN: Primary | ICD-10-CM

## 2021-11-09 DIAGNOSIS — N30.01 ACUTE CYSTITIS WITH HEMATURIA: ICD-10-CM

## 2021-11-09 LAB
ALBUMIN UR-MCNC: ABNORMAL MG/DL
APPEARANCE UR: ABNORMAL
BACTERIA #/AREA URNS HPF: ABNORMAL /HPF
BILIRUB UR QL STRIP: NEGATIVE
COLOR UR AUTO: YELLOW
GLUCOSE UR STRIP-MCNC: NEGATIVE MG/DL
HGB UR QL STRIP: ABNORMAL
KETONES UR STRIP-MCNC: NEGATIVE MG/DL
LEUKOCYTE ESTERASE UR QL STRIP: ABNORMAL
NITRATE UR QL: NEGATIVE
PH UR STRIP: 5.5 [PH] (ref 5–7)
RBC #/AREA URNS AUTO: ABNORMAL /HPF
SP GR UR STRIP: >=1.03 (ref 1–1.03)
SQUAMOUS #/AREA URNS AUTO: ABNORMAL /LPF
UROBILINOGEN UR STRIP-ACNC: 0.2 E.U./DL
WBC #/AREA URNS AUTO: ABNORMAL /HPF
YEAST #/AREA URNS HPF: ABNORMAL /HPF
YEAST #/AREA URNS HPF: ABNORMAL /HPF

## 2021-11-09 PROCEDURE — 99214 OFFICE O/P EST MOD 30 MIN: CPT | Performed by: FAMILY MEDICINE

## 2021-11-09 PROCEDURE — 87086 URINE CULTURE/COLONY COUNT: CPT | Performed by: FAMILY MEDICINE

## 2021-11-09 PROCEDURE — 81001 URINALYSIS AUTO W/SCOPE: CPT | Performed by: FAMILY MEDICINE

## 2021-11-09 RX ORDER — CEPHALEXIN 500 MG/1
500 CAPSULE ORAL 2 TIMES DAILY
Qty: 20 CAPSULE | Refills: 0 | Status: SHIPPED | OUTPATIENT
Start: 2021-11-09 | End: 2021-11-19

## 2021-11-09 ASSESSMENT — PAIN SCALES - GENERAL: PAINLEVEL: SEVERE PAIN (6)

## 2021-11-09 NOTE — PROGRESS NOTES
Assessment & Plan     RLQ abdominal pain  - Pain likely from a ruptured ovarian cyst vs UTI   - If pain is not improved after UTI treatment advised her to follow up with gyn  - UA with Microscopic reflex to Culture - lab collect; Future  - UA with Microscopic reflex to Culture - lab collect  - Urine Microscopic    Acute cystitis with hematuria  - Treated with Bactrim recently, but still showing signs of infection  - Previous urine culture with resistance to Macrobid  - Treat with Keflex   - UA with Microscopic reflex to Culture - lab collect; Future  - UA with Microscopic reflex to Culture - lab collect  - Urine Microscopic  - cephALEXin (KEFLEX) 500 MG capsule; Take 1 capsule (500 mg) by mouth 2 times daily for 10 days  - Urine Culture Aerobic Bacterial - lab collect      Return in about 1 week (around 11/16/2021) for if symptoms worsen or fail to improve.    Elida Castaneda [facility]    =================================================================    Subjective   Keerthi is a 51 year old who presents for the following health issues     HPI     ED/UC Followup:    Facility:  [facility]  Date of visit: 10/20/21  Reason for visit: abdominal pain  Current Status: still having abdominal pain but not as severe and it's tender     Found to have a UTI and treated with Bactrim.  Also had evidence of a possible ruptured right ovarian cyst (fluid around right adnexa on US).  CT abdomen was normal.    She has continued to have pain in her RLQ.  Describes it as a burning pain.  She also has pain with sexual activity.  She currently having dysuria and also noticed things that looked like clots in her urine.  She finished the Bactrim on 10/27    Had a UTI over the summer that showed some resistance to Macrobid and ended up going away after treatment with Keflex.        Review of Systems   Constitutional, HEENT, cardiovascular, pulmonary, gi and gu systems are negative,  except as otherwise noted.      Objective    /82 (BP Location: Right arm, Patient Position: Chair, Cuff Size: Adult Large)   Pulse 74   Temp 98  F (36.7  C) (Oral)   Resp 18   Wt 140.1 kg (308 lb 12.8 oz)   LMP 09/05/2020   SpO2 100%   BMI 47.62 kg/m    Body mass index is 47.62 kg/m .  Physical Exam   GENERAL: healthy, alert and no distress  ABDOMEN: Soft.  Mild RLQ pain with no rebound or guarding.  No masses or organomegaly.     Results for orders placed or performed in visit on 11/09/21 (from the past 24 hour(s))   UA with Microscopic reflex to Culture - lab collect    Specimen: Urine, Clean Catch   Result Value Ref Range    Color Urine Yellow Colorless, Straw, Light Yellow, Yellow    Appearance Urine Slightly Cloudy (A) Clear    Glucose Urine Negative Negative mg/dL    Bilirubin Urine Negative Negative    Ketones Urine Negative Negative mg/dL    Specific Gravity Urine >=1.030 1.003 - 1.035    Blood Urine Trace (A) Negative    pH Urine 5.5 5.0 - 7.0    Protein Albumin Urine Trace (A) Negative mg/dL    Urobilinogen Urine 0.2 0.2, 1.0 E.U./dL    Nitrite Urine Negative Negative    Leukocyte Esterase Urine Small (A) Negative   Urine Microscopic   Result Value Ref Range    Bacteria Urine Many (A) None Seen /HPF    RBC Urine 2-5 (A) 0-2 /HPF /HPF    WBC Urine 5-10 (A) 0-5 /HPF /HPF    Squamous Epithelials Urine Many (A) None Seen /LPF    Budding Yeast Urine Moderate (A) None Seen /HPF    Hyphal Yeast Urine Few (A) None Seen /HPF    Narrative    Urine Culture not indicated

## 2021-11-09 NOTE — PATIENT INSTRUCTIONS
Patient Education     Understanding Urinary Tract Infections (UTIs)   Most UTIs are caused by bacteria, but they may also be caused by viruses or fungi. Bacteria from the bowel are the most common source of infection. The infection may start because of any of the following:     Sexual activity.  During sex, bacteria can travel from the penis, vagina, or rectum into the urethra.     Bacteria outside the rectum getting into the urethra.  Bacteria on the skin outside the rectum may travel into the urethra. This is more common in women since the rectum and urethra are closer to each other than in men. Wiping from front to back after using the toilet and keeping the area clean can help prevent germs from getting to the urethra.    Blocked urine flow through the urinary tract. If urine sits too long, germs may start to grow out of control.  Parts of the urinary tract  The infection can occur in any part of the urinary tract.       The kidneys. These organs collect and store urine.    The ureters. These tubes carry urine from the kidneys to the bladder.    The bladder. This holds urine until you are ready to let it out.    The urethra. This tube carries urine from the bladder out of the body. It is shorter in women, so bacteria can move through it more easily. The urethra is longer in men, so a UTI is less likely to reach the bladder or kidneys in men.  [employer] last reviewed this educational content on 1/1/2020 2000-2021 [employer]. All rights reserved. This information is not intended as a substitute for professional medical care. Always follow your healthcare professional's instructions.

## 2021-11-10 LAB — BACTERIA UR CULT: NORMAL

## 2021-12-13 ENCOUNTER — VIRTUAL VISIT (OUTPATIENT)
Dept: FAMILY MEDICINE | Facility: CLINIC | Age: 51
End: 2021-12-13
Payer: COMMERCIAL

## 2021-12-13 DIAGNOSIS — F43.10 PTSD (POST-TRAUMATIC STRESS DISORDER): Primary | ICD-10-CM

## 2021-12-13 DIAGNOSIS — F33.1 MODERATE EPISODE OF RECURRENT MAJOR DEPRESSIVE DISORDER (H): ICD-10-CM

## 2021-12-13 DIAGNOSIS — F41.1 GAD (GENERALIZED ANXIETY DISORDER): ICD-10-CM

## 2021-12-13 PROCEDURE — 99214 OFFICE O/P EST MOD 30 MIN: CPT | Mod: 95 | Performed by: FAMILY MEDICINE

## 2021-12-13 RX ORDER — BUPROPION HYDROCHLORIDE 150 MG/1
150 TABLET ORAL EVERY MORNING
Qty: 90 TABLET | Refills: 1 | Status: SHIPPED | OUTPATIENT
Start: 2021-12-13

## 2021-12-13 NOTE — PROGRESS NOTES
Keerthi is a 51 year old who is being evaluated via a billable video visit.      How would you like to obtain your AVS? MyChart  If the video visit is dropped, the invitation should be resent by: Text to cell phone: [phone]  Will anyone else be joining your video visit? No    =====================================================================================================    Assessment & Plan     PTSD (post-traumatic stress disorder)  ANASTASIIA (generalized anxiety disorder)  Moderate episode of recurrent major depressive disorder (H)  - buPROPion (WELLBUTRIN XL) 150 MG 24 hr tablet; Take 1 tablet (150 mg) by mouth every morning  - Adult Mental Health Referral; Future    Patient continues to have significant mental health symptoms despite increasing her sertraline dose last month.  We will add Wellbutrin to her sertraline to help boost it's effectiveness.  Continue trazodone at bedtime to help with insomnia.  Referral made to psych to assist with medication management.  Will extend her RENETTA with goal return to work day of 1/13/22.  Will need to write a letter to request appropriate paperwork.      Return in about 4 weeks (around 1/10/2022) for Depression/Anxiety.    Elida Castaneda, [facility]    =========================================================================================    Subjective   Keerthi is a 51 year old who presents for the following health issues:    HPI     Patient currently on a RENETTA from work due to her PTSD.  Return to work day was 12/13.  Somehow her work has her returning on 12/1.  She's interested in extending her RENETTA a little.  Hasn't returned to work yet.  Her  recently had COVID.  She tested negative.  She's been feeling stressed and overwhelmed.  Many family members are leaning on her right now and she feels like she has no one there for her.  Did speak with a new therapist recently, but didn't feel they were helpful.  She's looking into  getting back in with her old therapist (virtually).  She has noticed improvement in her sleep with trazodone, but is having really odd dreams.  Currently taking sertraline 150 mg daily.  We increased it last month and she feels it's been helpful.  She did have one panic attack since our last visit.  She's interested in extending her return to work to 1/13/22.          Review of Systems   Constitutional, HEENT, cardiovascular, pulmonary, gi and gu systems are negative, except as otherwise noted.      Objective           Vitals:  No vitals were obtained today due to virtual visit.    Physical Exam   GENERAL: Healthy, alert and no distress  EYES: Eyes grossly normal to inspection.  No discharge or erythema, or obvious scleral/conjunctival abnormalities.  RESP: No audible wheeze, cough, or visible cyanosis.  No visible retractions or increased work of breathing.    SKIN: Visible skin clear. No significant rash, abnormal pigmentation or lesions.  NEURO: Cranial nerves grossly intact.  Mentation and speech appropriate for age.  PSYCH: Mentation appears normal, affect flat, judgement and insight intact, normal speech and appearance well-groomed.          Video-Visit Details    Type of service:  Video Visit    Video Start Time: 2:43 PM  Video End Time: 3:13 PM     Originating Location (pt. Location): Home    Distant Location (provider location):  [facility]     Platform used for Video Visit: [employer]

## 2021-12-13 NOTE — LETTER
[facility]  [address] [address]  Phone: [phone]    December 13, 2021        Keerthi Sloan  [address]  [place] [address]  Claim number: [id number]  Policy number: [id number]    To whom it may concern:    RE: Keerthi Sloan has been seeing me for many years for treatment of her major depression, generalized anxiety and PTSD.  She had been stable for quite some time and then had recurrence of her symptoms earlier this year.  I had recently completed paperwork to request a leave of absence from work with a goal return date of 12/13/21.  Unfortunately, her symptoms have continued to affect her quality of life and daily functioning and she will need an extension of her leave of absence.  Her new goal return to work date will be 1/13/22.  I received paperwork via fax recently regarding her case.  I have completed it, however suspect that she will need different documentation to extend her leave of absence date.  Please send any further paperwork required to my office.  I have also enclosed her records from our visit today for your reference.      Please contact me for questions or concerns.      Sincerely,          Elida Castaneda, DO

## 2021-12-14 DIAGNOSIS — E55.9 VITAMIN D DEFICIENCY: ICD-10-CM

## 2021-12-15 DIAGNOSIS — E66.01 CLASS 3 SEVERE OBESITY DUE TO EXCESS CALORIES WITH SERIOUS COMORBIDITY AND BODY MASS INDEX (BMI) OF 45.0 TO 49.9 IN ADULT (H): ICD-10-CM

## 2021-12-15 DIAGNOSIS — E66.813 CLASS 3 SEVERE OBESITY DUE TO EXCESS CALORIES WITH SERIOUS COMORBIDITY AND BODY MASS INDEX (BMI) OF 45.0 TO 49.9 IN ADULT (H): ICD-10-CM

## 2021-12-15 DIAGNOSIS — R73.03 PREDIABETES: ICD-10-CM

## 2021-12-15 RX ORDER — METHOCARBAMOL 750 MG/1
TABLET ORAL
Qty: 12 CAPSULE | Refills: 2 | Status: SHIPPED | OUTPATIENT
Start: 2021-12-15

## 2021-12-15 NOTE — TELEPHONE ENCOUNTER
Routing refill request to provider for review/approval because:  Labs out of range:    Hemoglobin A1C POCT   Date Value Ref Range Status   05/25/2021 5.9 (H) 0 - 5.6 % Final     Comment:     Normal <5.7% Prediabetes 5.7-6.4%  Diabetes 6.5% or higher - adopted from ADA   consensus guidelines.         Precious Worthington, RN, BSN, PHN  [facility]: [place]

## 2021-12-15 NOTE — TELEPHONE ENCOUNTER
Routing refill request to provider for review/approval because:  High dose Vitamin D Rx requires provider review, fails protocol.     Precious Worthington, RN, BSN, PHN  [facility]: [place]

## 2021-12-25 NOTE — TELEPHONE ENCOUNTER
Routing refill request to provider for review/approval because:  Drug not on the [facility] refill protocol   Joaquin Frazier RN, BSN           You can access the FollowMyHealth Patient Portal offered by [facility] by registering at the following website: [email or web address]. By joining [employer]’s FollowMyHealth portal, you will also be able to view your health information using other applications (apps) compatible with our system.

## 2022-01-03 ENCOUNTER — TELEPHONE (OUTPATIENT)
Dept: FAMILY MEDICINE | Facility: CLINIC | Age: 52
End: 2022-01-03
Payer: COMMERCIAL

## 2022-01-03 NOTE — TELEPHONE ENCOUNTER
Received fax from [employer].  Medical Provider Form-Behavioral Health.  Form faxed to clinic 12/18/21     Form completed and signed and faxed.    Copy made for chart- stat scan claribel Alves/

## 2022-01-10 ENCOUNTER — VIRTUAL VISIT (OUTPATIENT)
Dept: FAMILY MEDICINE | Facility: CLINIC | Age: 52
End: 2022-01-10
Payer: COMMERCIAL

## 2022-01-10 DIAGNOSIS — F33.1 MODERATE EPISODE OF RECURRENT MAJOR DEPRESSIVE DISORDER (H): ICD-10-CM

## 2022-01-10 DIAGNOSIS — F41.1 GAD (GENERALIZED ANXIETY DISORDER): ICD-10-CM

## 2022-01-10 DIAGNOSIS — F43.10 PTSD (POST-TRAUMATIC STRESS DISORDER): Primary | ICD-10-CM

## 2022-01-10 DIAGNOSIS — U07.1 INFECTION DUE TO 2019 NOVEL CORONAVIRUS: ICD-10-CM

## 2022-01-10 PROCEDURE — 99213 OFFICE O/P EST LOW 20 MIN: CPT | Mod: TEL | Performed by: FAMILY MEDICINE

## 2022-01-10 ASSESSMENT — PATIENT HEALTH QUESTIONNAIRE - PHQ9
SUM OF ALL RESPONSES TO PHQ QUESTIONS 1-9: 20
5. POOR APPETITE OR OVEREATING: NEARLY EVERY DAY

## 2022-01-10 ASSESSMENT — ANXIETY QUESTIONNAIRES
2. NOT BEING ABLE TO STOP OR CONTROL WORRYING: NEARLY EVERY DAY
5. BEING SO RESTLESS THAT IT IS HARD TO SIT STILL: MORE THAN HALF THE DAYS
1. FEELING NERVOUS, ANXIOUS, OR ON EDGE: MORE THAN HALF THE DAYS
IF YOU CHECKED OFF ANY PROBLEMS ON THIS QUESTIONNAIRE, HOW DIFFICULT HAVE THESE PROBLEMS MADE IT FOR YOU TO DO YOUR WORK, TAKE CARE OF THINGS AT HOME, OR GET ALONG WITH OTHER PEOPLE: VERY DIFFICULT
6. BECOMING EASILY ANNOYED OR IRRITABLE: NEARLY EVERY DAY
GAD7 TOTAL SCORE: 18
3. WORRYING TOO MUCH ABOUT DIFFERENT THINGS: NEARLY EVERY DAY
7. FEELING AFRAID AS IF SOMETHING AWFUL MIGHT HAPPEN: MORE THAN HALF THE DAYS

## 2022-01-10 NOTE — PROGRESS NOTES
Keerthi is a 51 year old who is being evaluated via a billable video visit.      How would you like to obtain your AVS? MyChart  If the video visit is dropped, the invitation should be resent by: Text to cell phone: [phone]  Will anyone else be joining your video visit? No    Patient was unable to get sound function to work on [employer] so we transitioned to a telephone visit.    =====================================================================================    Assessment & Plan     PTSD (post-traumatic stress disorder)  ANASTASIIA (generalized anxiety disorder)  Moderate episode of recurrent major depressive disorder (H)  - Improving on current regimen of medication  - Continue current meds for now.  Had a previous referral to psych and will hold off on that for now    Infection due to 2019 novel coronavirus  - Overnight admission for hypoxia.  Improving.  No O2 needed     Return in about 3 months (around 4/10/2022) for Depression/Anxiety.    Elida Castaneda, DO  [facility]    ========================================================================================    Subjective   Keerthi is a 51 year old who presents for the following health issues:    HPI     Depression and Anxiety Follow-Up    How are you doing with your depression since your last visit? Improved    How are you doing with your anxiety since your last visit?  Improved    Are you having other symptoms that might be associated with depression or anxiety? No    Have you had a significant life event? OTHER: getting over COVID     Do you have any concerns with your use of alcohol or other drugs? No     Added Wellbutrin to her sertraline last month.  She also takes trazodone for insomnia.  Referral made to psychiatry to help with medication management.  She feels a big improvement since starting the Wellbutrin.  She is currently on a RENETTA from work for her PTSD/depression (end date 1/13/22).  She has decided that this  isn't the right job for her and plans to resign.    Social History     Tobacco Use     Smoking status: Never Smoker     Smokeless tobacco: Never Used   Substance Use Topics     Alcohol use: No     Alcohol/week: 0.0 standard drinks     Drug use: No     PHQ 11/5/2020 11/1/2021 1/10/2022   PHQ-9 Total Score 2 22 20   Q9: Thoughts of better off dead/self-harm past 2 weeks Not at all Several days Not at all   F/U: Thoughts of suicide or self-harm - - -   F/U: Self harm-plan - - -   F/U: Self-harm action - - -   F/U: Safety concerns - - -     ANASTASIIA-7 SCORE 6/11/2020 11/1/2021 1/10/2022   Total Score - - -   Total Score 7 21 18     Last PHQ-9 1/10/2022   1.  Little interest or pleasure in doing things 2   2.  Feeling down, depressed, or hopeless 2   3.  Trouble falling or staying asleep, or sleeping too much 3   4.  Feeling tired or having little energy 3   5.  Poor appetite or overeating 3   6.  Feeling bad about yourself 2   7.  Trouble concentrating 3   8.  Moving slowly or restless 2   Q9: Thoughts of better off dead/self-harm past 2 weeks 0   PHQ-9 Total Score 20   Difficulty at work, home, or with people Very difficult   In the past two weeks have you had thoughts of suicide or self harm? -   Do you have concerns about your personal safety or the safety of others? -   In the past 2 weeks have you thought about a plan or had intention to harm yourself? -   In the past 2 weeks have you acted on these thoughts in any way? -     ANASTASIIA-7  1/10/2022   1. Feeling nervous, anxious, or on edge 2   2. Not being able to stop or control worrying 3   3. Worrying too much about different things 3   4. Trouble relaxing 3   5. Being so restless that it is hard to sit still 2   6. Becoming easily annoyed or irritable 3   7. Feeling afraid, as if something awful might happen 2   ANASTASIIA-7 Total Score 18   If you checked any problems, how difficult have they made it for you to do your work, take care of things at home, or get along with other  people? Very difficult       Hospital Follow-up Visit:    Hospital/Nursing Home/IP Rehab Facility: [facility]  Date of Admission: 1/1/2022  Date of Discharge: 1/2/22  Reason(s) for Admission: Dyspnea and hypoxia 2/2 COVID      Was your hospitalization related to COVID-19? YES   How are you feeling today? Much better  In the past 24 hours have you had shortness of breath when speaking, walking, or climbing stairs? My breathing issues have improved  Do you have a cough? Yes, I have a cough but it's not worse  When is the last time you had a fever greater than 100? 12/25/21  Are you having any other symptoms? Diarrhea, Loss of sense of taste or smell, Pain or pressure in chest and Body aches   Do you have any other stressors you would like to discuss with your provider? No    PHQ Assesment Total Score(s) 1/10/2022   PHQ-9 Score 20   Some recent data might be hidden     ANASTASIIA-7 Results 8/8/2018 10/1/2018   ANASTASIIA 7 TOTAL SCORE 14 (moderate anxiety) 6 (mild anxiety)   Some recent data might be hidden     Was the patient in the ICU or did the patient experience delirium during hospitalization?  No    Problems taking medications regularly:  None  Medication changes since discharge: None  Problems adhering to non-medication therapy:  None    Summary of hospitalization:  CareEverywhere information obtained and reviewed  Diagnostic Tests/Treatments reviewed.  Follow up needed: none  Other Healthcare Providers Involved in Patient s Care:         None  Update since discharge: improved.   Post Discharge Medication Reconciliation: discharge medications reconciled, continue medications without change.  Plan of care communicated with patient         Review of Systems   Constitutional, HEENT, cardiovascular, pulmonary, gi and gu systems are negative, except as otherwise noted.      Objective           Vitals:  No vitals were obtained today due to virtual visit.    Physical Exam   GENERAL: Healthy, alert and no distress  RESP: No  audible wheeze, cough.   PSYCH: Mentation normal, affect normal/bright, judgement and insight intact, normal speech.          Telephone visit duration: 13

## 2022-01-11 ASSESSMENT — ANXIETY QUESTIONNAIRES: GAD7 TOTAL SCORE: 18

## 2022-04-09 ENCOUNTER — HEALTH MAINTENANCE LETTER (OUTPATIENT)
Age: 52
End: 2022-04-09

## 2022-07-30 ENCOUNTER — HEALTH MAINTENANCE LETTER (OUTPATIENT)
Age: 52
End: 2022-07-30

## 2022-10-10 ENCOUNTER — HEALTH MAINTENANCE LETTER (OUTPATIENT)
Age: 52
End: 2022-10-10

## 2023-05-27 ENCOUNTER — HEALTH MAINTENANCE LETTER (OUTPATIENT)
Age: 53
End: 2023-05-27

## 2023-08-20 ENCOUNTER — HEALTH MAINTENANCE LETTER (OUTPATIENT)
Age: 53
End: 2023-08-20

## 2024-10-13 ENCOUNTER — HEALTH MAINTENANCE LETTER (OUTPATIENT)
Age: 54
End: 2024-10-13

## (undated) DEVICE — BAG CLEAR TRASH 1.3M 39X33" P4040C

## (undated) DEVICE — SOL NACL 0.9% IRRIG 1000ML BOTTLE 2F7124

## (undated) DEVICE — GOWN XLG DISP 9545

## (undated) DEVICE — SUCTION TIP POOLE K770

## (undated) DEVICE — SU PDS II 0 CTX 60" Z990G

## (undated) DEVICE — BLADE KNIFE SURG 10 371110

## (undated) DEVICE — PACK ABD HYST LATEX PB15TBFCR

## (undated) DEVICE — DECANTER BAG 2002S

## (undated) DEVICE — SU VICRYL 0 TIE 12X18" J906G

## (undated) DEVICE — SU VICRYL 2-0 CT-1 27" UND J259H

## (undated) DEVICE — DRSG STERI STRIP 1/2X4" R1547

## (undated) DEVICE — LINEN HALF SHEET 5512

## (undated) DEVICE — GLOVE PROTEXIS W/NEU-THERA 5.5  2D73TE55

## (undated) DEVICE — Device

## (undated) DEVICE — SU MONOCRYL 3-0 PS-2 27" Y427H

## (undated) DEVICE — LINEN FULL SHEET 5511

## (undated) DEVICE — NDL BLUNT 18GA 1" W/O FILTER 305181

## (undated) DEVICE — CATH TRAY FOLEY SURESTEP 16FR DRAIN BAG STATOCK A899916

## (undated) DEVICE — LINEN ORTHO ACL PACK 5447

## (undated) DEVICE — DRSG ABDOMINAL 07 1/2X8" 7197D

## (undated) DEVICE — SPONGE LAP 18X36" 422

## (undated) DEVICE — SU VICRYL 0 CT-1 36" J346H

## (undated) DEVICE — SYR 10ML FINGER CONTROL W/O NDL 309695

## (undated) DEVICE — SU VICRYL 2-0 CT-2 27" UND J269H

## (undated) DEVICE — SOL NACL 0.9% INJ 250ML BAG 2B1322Q

## (undated) DEVICE — SYR 03ML LL W/O NDL 309657

## (undated) DEVICE — NDL 22GA 1.5"

## (undated) DEVICE — SUCTION MANIFOLD NEPTUNE 2 SYS 4 PORT 0702-020-000

## (undated) DEVICE — ESU LIGASURE IMPACT OPEN SEALER/DVDR CVD LG JAW LF4418

## (undated) RX ORDER — FENTANYL CITRATE 50 UG/ML
INJECTION, SOLUTION INTRAMUSCULAR; INTRAVENOUS
Status: DISPENSED
Start: 2020-09-23

## (undated) RX ORDER — NEOSTIGMINE METHYLSULFATE 1 MG/ML
VIAL (ML) INJECTION
Status: DISPENSED
Start: 2020-09-23

## (undated) RX ORDER — CEFAZOLIN SODIUM 1 G/3ML
INJECTION, POWDER, FOR SOLUTION INTRAMUSCULAR; INTRAVENOUS
Status: DISPENSED
Start: 2020-09-23

## (undated) RX ORDER — HYDROMORPHONE HYDROCHLORIDE 1 MG/ML
INJECTION, SOLUTION INTRAMUSCULAR; INTRAVENOUS; SUBCUTANEOUS
Status: DISPENSED
Start: 2020-09-23

## (undated) RX ORDER — GLYCOPYRROLATE 0.2 MG/ML
INJECTION INTRAMUSCULAR; INTRAVENOUS
Status: DISPENSED
Start: 2020-09-23

## (undated) RX ORDER — DEXAMETHASONE SODIUM PHOSPHATE 4 MG/ML
INJECTION, SOLUTION INTRA-ARTICULAR; INTRALESIONAL; INTRAMUSCULAR; INTRAVENOUS; SOFT TISSUE
Status: DISPENSED
Start: 2020-09-23

## (undated) RX ORDER — PROPOFOL 10 MG/ML
INJECTION, EMULSION INTRAVENOUS
Status: DISPENSED
Start: 2020-09-23

## (undated) RX ORDER — LIDOCAINE HYDROCHLORIDE 10 MG/ML
INJECTION, SOLUTION EPIDURAL; INFILTRATION; INTRACAUDAL; PERINEURAL
Status: DISPENSED
Start: 2020-09-23

## (undated) RX ORDER — CEFAZOLIN SODIUM IN 0.9 % NACL 3 G/100 ML
INTRAVENOUS SOLUTION, PIGGYBACK (ML) INTRAVENOUS
Status: DISPENSED
Start: 2020-09-23

## (undated) RX ORDER — VASOPRESSIN 20 U/ML
INJECTION PARENTERAL
Status: DISPENSED
Start: 2020-09-23

## (undated) RX ORDER — ONDANSETRON 2 MG/ML
INJECTION INTRAMUSCULAR; INTRAVENOUS
Status: DISPENSED
Start: 2020-09-23